# Patient Record
Sex: FEMALE | Race: WHITE | Employment: OTHER | URBAN - METROPOLITAN AREA
[De-identification: names, ages, dates, MRNs, and addresses within clinical notes are randomized per-mention and may not be internally consistent; named-entity substitution may affect disease eponyms.]

---

## 2017-05-01 PROBLEM — R55 EPISODE OF SYNCOPE: Status: ACTIVE | Noted: 2017-05-01

## 2017-05-01 PROBLEM — R42 DIZZINESS: Status: ACTIVE | Noted: 2017-05-01

## 2017-05-01 PROBLEM — E78.9 DISORDER OF LIPID METABOLISM: Status: ACTIVE | Noted: 2017-05-01

## 2017-07-14 ENCOUNTER — OFFICE VISIT (OUTPATIENT)
Dept: ORTHOPEDIC SURGERY | Age: 82
End: 2017-07-14
Payer: MEDICARE

## 2017-07-14 VITALS
HEART RATE: 93 BPM | SYSTOLIC BLOOD PRESSURE: 112 MMHG | WEIGHT: 169 LBS | DIASTOLIC BLOOD PRESSURE: 70 MMHG | BODY MASS INDEX: 28.85 KG/M2 | HEIGHT: 64 IN

## 2017-07-14 DIAGNOSIS — Z96.653 STATUS POST TOTAL BILATERAL KNEE REPLACEMENT: ICD-10-CM

## 2017-07-14 DIAGNOSIS — M25.562 CHRONIC PAIN OF BOTH KNEES: Primary | ICD-10-CM

## 2017-07-14 DIAGNOSIS — G89.29 CHRONIC PAIN OF BOTH KNEES: Primary | ICD-10-CM

## 2017-07-14 DIAGNOSIS — M25.561 CHRONIC PAIN OF BOTH KNEES: Primary | ICD-10-CM

## 2017-07-14 PROCEDURE — 4040F PNEUMOC VAC/ADMIN/RCVD: CPT | Performed by: ORTHOPAEDIC SURGERY

## 2017-07-14 PROCEDURE — 1123F ACP DISCUSS/DSCN MKR DOCD: CPT | Performed by: ORTHOPAEDIC SURGERY

## 2017-07-14 PROCEDURE — 1036F TOBACCO NON-USER: CPT | Performed by: ORTHOPAEDIC SURGERY

## 2017-07-14 PROCEDURE — G8419 CALC BMI OUT NRM PARAM NOF/U: HCPCS | Performed by: ORTHOPAEDIC SURGERY

## 2017-07-14 PROCEDURE — 99213 OFFICE O/P EST LOW 20 MIN: CPT | Performed by: ORTHOPAEDIC SURGERY

## 2017-07-14 PROCEDURE — G8427 DOCREV CUR MEDS BY ELIG CLIN: HCPCS | Performed by: ORTHOPAEDIC SURGERY

## 2017-07-14 PROCEDURE — 1090F PRES/ABSN URINE INCON ASSESS: CPT | Performed by: ORTHOPAEDIC SURGERY

## 2017-07-14 PROCEDURE — G8400 PT W/DXA NO RESULTS DOC: HCPCS | Performed by: ORTHOPAEDIC SURGERY

## 2018-12-26 ENCOUNTER — HOSPITAL ENCOUNTER (EMERGENCY)
Age: 83
Discharge: HOME | End: 2018-12-26
Attending: SPECIALIST
Payer: MEDICARE

## 2018-12-26 ENCOUNTER — APPOINTMENT (OUTPATIENT)
Dept: CT IMAGING | Age: 83
End: 2018-12-26
Payer: MEDICARE

## 2018-12-26 ENCOUNTER — APPOINTMENT (OUTPATIENT)
Dept: GENERAL RADIOLOGY | Age: 83
End: 2018-12-26
Payer: MEDICARE

## 2018-12-26 ENCOUNTER — TELEPHONE (OUTPATIENT)
Dept: PRIMARY CARE CLINIC | Age: 83
End: 2018-12-26

## 2018-12-26 VITALS
DIASTOLIC BLOOD PRESSURE: 59 MMHG | HEIGHT: 64 IN | SYSTOLIC BLOOD PRESSURE: 149 MMHG | OXYGEN SATURATION: 98 % | TEMPERATURE: 98.2 F | WEIGHT: 167 LBS | HEART RATE: 98 BPM | BODY MASS INDEX: 28.51 KG/M2 | RESPIRATION RATE: 12 BRPM

## 2018-12-26 DIAGNOSIS — M25.511 ACUTE PAIN OF RIGHT SHOULDER: ICD-10-CM

## 2018-12-26 DIAGNOSIS — R55 SYNCOPE AND COLLAPSE: Primary | ICD-10-CM

## 2018-12-26 DIAGNOSIS — S09.90XA CLOSED HEAD INJURY, INITIAL ENCOUNTER: ICD-10-CM

## 2018-12-26 PROCEDURE — 73030 X-RAY EXAM OF SHOULDER: CPT

## 2018-12-26 PROCEDURE — 72125 CT NECK SPINE W/O DYE: CPT

## 2018-12-26 PROCEDURE — 6370000000 HC RX 637 (ALT 250 FOR IP): Performed by: PHYSICIAN ASSISTANT

## 2018-12-26 PROCEDURE — 70450 CT HEAD/BRAIN W/O DYE: CPT

## 2018-12-26 PROCEDURE — 99284 EMERGENCY DEPT VISIT MOD MDM: CPT

## 2018-12-26 RX ORDER — HYDROCODONE BITARTRATE AND ACETAMINOPHEN 5; 325 MG/1; MG/1
1 TABLET ORAL ONCE
Status: DISCONTINUED | OUTPATIENT
Start: 2018-12-26 | End: 2018-12-26

## 2018-12-26 RX ORDER — ACETAMINOPHEN 325 MG/1
650 TABLET ORAL ONCE
Status: COMPLETED | OUTPATIENT
Start: 2018-12-26 | End: 2018-12-26

## 2018-12-26 RX ORDER — ACETAMINOPHEN AND CODEINE PHOSPHATE 300; 30 MG/1; MG/1
1 TABLET ORAL ONCE
Status: DISCONTINUED | OUTPATIENT
Start: 2018-12-26 | End: 2018-12-26

## 2018-12-26 RX ADMIN — ACETAMINOPHEN 650 MG: 325 TABLET ORAL at 14:45

## 2018-12-26 ASSESSMENT — ENCOUNTER SYMPTOMS
EYE DISCHARGE: 0
VOMITING: 0
COLOR CHANGE: 1
EYE REDNESS: 0
SHORTNESS OF BREATH: 0
ABDOMINAL PAIN: 0
BACK PAIN: 0
SORE THROAT: 0
COUGH: 0

## 2018-12-26 ASSESSMENT — PAIN SCALES - GENERAL
PAINLEVEL_OUTOF10: 0
PAINLEVEL_OUTOF10: 0

## 2018-12-26 NOTE — TELEPHONE ENCOUNTER
Pt calling to say that she had an episode this morning and fell from her chair. Pt states that she fell on her right side and landed on her shoulder. Pt complaining of her shoulder hurting. Pt is going to go [facility] [place] [facility] done.  Please advise HL

## 2018-12-26 NOTE — ED PROVIDER NOTES
[facility] ED  [address] [address]  Phone: [phone]  Fax: [phone]      Pt Name: Kala Hernandez  MRN: [id number]  [place] 1935  Date of evaluation: 12/26/2018      CHIEF COMPLAINT       Chief Complaint   Patient presents with   Marilyn Ramal Fall     Patient sitting in a chair and fell out off chair with syncope. Patient has hx of same.  Loss of Consciousness     Patient states \"I have a hx of vascular syncope since I was 13 y/o. \"       HISTORY OF PRESENT ILLNESS   (Location, Quality, Severity, Duration, Timing, Context, ModifyingFactors, Associated Signs and Symptoms)     Kala Hernandez is a 80 y.o. female who presents to the ER for evaluation following a syncopal episode. Patient states that she has a known history of syncope. She states that she has been having syncopal episodes since the age of 12. She was sitting in a chair at home drinking water when she felt as if she might pass out. Patient states that she fell hitting the right side of her head on the floor. She denies headaches, change in vision, and dizziness since her fall. She also sustained an injury to her right shoulder. Patient is right-hand dominant. She denies chest pain, difficulty breathing, dizziness, numbness or weakness in her extremities as symptoms prior to her syncopal episode. Patient denies neck pain and back pain. Patient has not taken any medication for her pain prior to arrival to the ER. Patient is insistent that her syncope is not due to any issues with her heart. She states that she has undergone several cardiac workups when she has passed out. Patient is refusing cardiac workup today. She simply would like to proceed with extending studies. Patient denies acute pain. She states that she only has shoulder pain with movement. Nursing Notes were reviewed.     REVIEW OF SYSTEMS     (2-9 systems for level 4, 10 or more for level 5)    Review of Systems

## 2018-12-27 ENCOUNTER — CARE COORDINATION (OUTPATIENT)
Dept: CARE COORDINATION | Age: 83
End: 2018-12-27

## 2018-12-27 NOTE — CARE COORDINATION
Patient was called to follow up with most recent ER visit. A man states pt is not home. A message was left to have patient call back regarding ER visit. Office number given [phone].

## 2019-01-02 ENCOUNTER — OFFICE VISIT (OUTPATIENT)
Dept: PRIMARY CARE CLINIC | Age: 84
End: 2019-01-02
Payer: MEDICARE

## 2019-01-02 VITALS
OXYGEN SATURATION: 94 % | BODY MASS INDEX: 29.7 KG/M2 | DIASTOLIC BLOOD PRESSURE: 72 MMHG | HEART RATE: 80 BPM | WEIGHT: 173 LBS | SYSTOLIC BLOOD PRESSURE: 148 MMHG

## 2019-01-02 DIAGNOSIS — S43.401A SPRAIN OF RIGHT SHOULDER, UNSPECIFIED SHOULDER SPRAIN TYPE, INITIAL ENCOUNTER: ICD-10-CM

## 2019-01-02 DIAGNOSIS — I95.1 ORTHOSTATIC HYPOTENSION: Primary | ICD-10-CM

## 2019-01-02 DIAGNOSIS — R55 VASOVAGAL SYNCOPE: ICD-10-CM

## 2019-01-02 PROCEDURE — G8427 DOCREV CUR MEDS BY ELIG CLIN: HCPCS | Performed by: FAMILY MEDICINE

## 2019-01-02 PROCEDURE — G8482 FLU IMMUNIZE ORDER/ADMIN: HCPCS | Performed by: FAMILY MEDICINE

## 2019-01-02 PROCEDURE — 1101F PT FALLS ASSESS-DOCD LE1/YR: CPT | Performed by: FAMILY MEDICINE

## 2019-01-02 PROCEDURE — 1036F TOBACCO NON-USER: CPT | Performed by: FAMILY MEDICINE

## 2019-01-02 PROCEDURE — 1090F PRES/ABSN URINE INCON ASSESS: CPT | Performed by: FAMILY MEDICINE

## 2019-01-02 PROCEDURE — 4040F PNEUMOC VAC/ADMIN/RCVD: CPT | Performed by: FAMILY MEDICINE

## 2019-01-02 PROCEDURE — G8399 PT W/DXA RESULTS DOCUMENT: HCPCS | Performed by: FAMILY MEDICINE

## 2019-01-02 PROCEDURE — G8417 CALC BMI ABV UP PARAM F/U: HCPCS | Performed by: FAMILY MEDICINE

## 2019-01-02 PROCEDURE — 99213 OFFICE O/P EST LOW 20 MIN: CPT | Performed by: FAMILY MEDICINE

## 2019-01-02 PROCEDURE — 1123F ACP DISCUSS/DSCN MKR DOCD: CPT | Performed by: FAMILY MEDICINE

## 2019-01-02 RX ORDER — MIDODRINE HYDROCHLORIDE 5 MG/1
5 TABLET ORAL EVERY 4 HOURS PRN
Qty: 30 TABLET | Refills: 0 | Status: SHIPPED | OUTPATIENT
Start: 2019-01-02 | End: 2019-03-11 | Stop reason: SDUPTHER

## 2019-01-02 ASSESSMENT — ENCOUNTER SYMPTOMS
SHORTNESS OF BREATH: 0
RHINORRHEA: 0
VOMITING: 0
COUGH: 0
SORE THROAT: 0
EYE REDNESS: 0
WHEEZING: 0
ABDOMINAL PAIN: 0
EYE DISCHARGE: 0
DIARRHEA: 0
NAUSEA: 0

## 2019-02-01 DIAGNOSIS — Z12.39 SCREENING FOR MALIGNANT NEOPLASM OF BREAST: Primary | ICD-10-CM

## 2019-03-06 DIAGNOSIS — Z12.39 SCREENING FOR MALIGNANT NEOPLASM OF BREAST: ICD-10-CM

## 2019-03-11 ENCOUNTER — OFFICE VISIT (OUTPATIENT)
Dept: PRIMARY CARE CLINIC | Age: 84
End: 2019-03-11
Payer: MEDICARE

## 2019-03-11 VITALS
OXYGEN SATURATION: 98 % | TEMPERATURE: 98.1 F | DIASTOLIC BLOOD PRESSURE: 64 MMHG | WEIGHT: 172.2 LBS | BODY MASS INDEX: 29.56 KG/M2 | HEART RATE: 97 BPM | SYSTOLIC BLOOD PRESSURE: 108 MMHG

## 2019-03-11 DIAGNOSIS — H61.21 IMPACTED CERUMEN OF RIGHT EAR: ICD-10-CM

## 2019-03-11 DIAGNOSIS — J20.9 ACUTE BRONCHITIS, UNSPECIFIED ORGANISM: Primary | ICD-10-CM

## 2019-03-11 PROCEDURE — G8427 DOCREV CUR MEDS BY ELIG CLIN: HCPCS | Performed by: PHYSICIAN ASSISTANT

## 2019-03-11 PROCEDURE — 4040F PNEUMOC VAC/ADMIN/RCVD: CPT | Performed by: PHYSICIAN ASSISTANT

## 2019-03-11 PROCEDURE — G8399 PT W/DXA RESULTS DOCUMENT: HCPCS | Performed by: PHYSICIAN ASSISTANT

## 2019-03-11 PROCEDURE — 1101F PT FALLS ASSESS-DOCD LE1/YR: CPT | Performed by: PHYSICIAN ASSISTANT

## 2019-03-11 PROCEDURE — G8417 CALC BMI ABV UP PARAM F/U: HCPCS | Performed by: PHYSICIAN ASSISTANT

## 2019-03-11 PROCEDURE — 1123F ACP DISCUSS/DSCN MKR DOCD: CPT | Performed by: PHYSICIAN ASSISTANT

## 2019-03-11 PROCEDURE — 1036F TOBACCO NON-USER: CPT | Performed by: PHYSICIAN ASSISTANT

## 2019-03-11 PROCEDURE — 1090F PRES/ABSN URINE INCON ASSESS: CPT | Performed by: PHYSICIAN ASSISTANT

## 2019-03-11 PROCEDURE — G8482 FLU IMMUNIZE ORDER/ADMIN: HCPCS | Performed by: PHYSICIAN ASSISTANT

## 2019-03-11 PROCEDURE — 69209 REMOVE IMPACTED EAR WAX UNI: CPT | Performed by: PHYSICIAN ASSISTANT

## 2019-03-11 PROCEDURE — 99213 OFFICE O/P EST LOW 20 MIN: CPT | Performed by: PHYSICIAN ASSISTANT

## 2019-03-11 RX ORDER — AZITHROMYCIN 250 MG/1
TABLET, FILM COATED ORAL
Qty: 1 PACKET | Refills: 0 | Status: SHIPPED | OUTPATIENT
Start: 2019-03-11 | End: 2019-03-21

## 2019-03-11 RX ORDER — MIDODRINE HYDROCHLORIDE 2.5 MG/1
2.5 TABLET ORAL EVERY 4 HOURS PRN
Qty: 90 TABLET | Refills: 0 | Status: SHIPPED | OUTPATIENT
Start: 2019-03-11

## 2019-03-11 ASSESSMENT — ENCOUNTER SYMPTOMS
COUGH: 1
SORE THROAT: 1
RHINORRHEA: 1
CHEST TIGHTNESS: 1
WHEEZING: 1
SHORTNESS OF BREATH: 1

## 2019-03-11 ASSESSMENT — PATIENT HEALTH QUESTIONNAIRE - PHQ9
SUM OF ALL RESPONSES TO PHQ9 QUESTIONS 1 & 2: 0
1. LITTLE INTEREST OR PLEASURE IN DOING THINGS: 0
SUM OF ALL RESPONSES TO PHQ QUESTIONS 1-9: 0
SUM OF ALL RESPONSES TO PHQ QUESTIONS 1-9: 0
2. FEELING DOWN, DEPRESSED OR HOPELESS: 0

## 2019-03-29 ENCOUNTER — OFFICE VISIT (OUTPATIENT)
Dept: PRIMARY CARE CLINIC | Age: 84
End: 2019-03-29
Payer: MEDICARE

## 2019-03-29 VITALS
SYSTOLIC BLOOD PRESSURE: 110 MMHG | HEART RATE: 75 BPM | DIASTOLIC BLOOD PRESSURE: 62 MMHG | OXYGEN SATURATION: 95 % | WEIGHT: 172.6 LBS | BODY MASS INDEX: 29.63 KG/M2

## 2019-03-29 DIAGNOSIS — E55.9 VITAMIN D DEFICIENCY: Primary | ICD-10-CM

## 2019-03-29 DIAGNOSIS — D64.9 ANEMIA, UNSPECIFIED TYPE: ICD-10-CM

## 2019-03-29 DIAGNOSIS — I95.1 ORTHOSTATIC HYPOTENSION: ICD-10-CM

## 2019-03-29 DIAGNOSIS — E78.2 MIXED HYPERLIPIDEMIA: ICD-10-CM

## 2019-03-29 PROCEDURE — G8427 DOCREV CUR MEDS BY ELIG CLIN: HCPCS | Performed by: FAMILY MEDICINE

## 2019-03-29 PROCEDURE — 1090F PRES/ABSN URINE INCON ASSESS: CPT | Performed by: FAMILY MEDICINE

## 2019-03-29 PROCEDURE — G8417 CALC BMI ABV UP PARAM F/U: HCPCS | Performed by: FAMILY MEDICINE

## 2019-03-29 PROCEDURE — G8399 PT W/DXA RESULTS DOCUMENT: HCPCS | Performed by: FAMILY MEDICINE

## 2019-03-29 PROCEDURE — 1036F TOBACCO NON-USER: CPT | Performed by: FAMILY MEDICINE

## 2019-03-29 PROCEDURE — 1123F ACP DISCUSS/DSCN MKR DOCD: CPT | Performed by: FAMILY MEDICINE

## 2019-03-29 PROCEDURE — 99213 OFFICE O/P EST LOW 20 MIN: CPT | Performed by: FAMILY MEDICINE

## 2019-03-29 PROCEDURE — 4040F PNEUMOC VAC/ADMIN/RCVD: CPT | Performed by: FAMILY MEDICINE

## 2019-03-29 PROCEDURE — G8482 FLU IMMUNIZE ORDER/ADMIN: HCPCS | Performed by: FAMILY MEDICINE

## 2019-03-29 RX ORDER — CHLORAL HYDRATE 500 MG
3000 CAPSULE ORAL 3 TIMES DAILY
COMMUNITY
End: 2022-05-03

## 2019-03-29 RX ORDER — FLUOCINOLONE ACETONIDE 0.1 MG/ML
SOLUTION TOPICAL 2 TIMES DAILY
COMMUNITY
End: 2020-09-21 | Stop reason: SDUPTHER

## 2019-03-29 ASSESSMENT — ENCOUNTER SYMPTOMS
DIARRHEA: 0
ABDOMINAL PAIN: 0
RHINORRHEA: 0
EYE REDNESS: 0
EYE DISCHARGE: 0
COUGH: 0
WHEEZING: 0
VOMITING: 0
NAUSEA: 0
SHORTNESS OF BREATH: 0
SORE THROAT: 0

## 2019-05-10 RX ORDER — DICLOFENAC SODIUM 75 MG/1
TABLET, DELAYED RELEASE ORAL
Qty: 180 TABLET | Refills: 1 | Status: SHIPPED | OUTPATIENT
Start: 2019-05-10 | End: 2019-07-15 | Stop reason: SDUPTHER

## 2019-06-14 ENCOUNTER — TELEPHONE (OUTPATIENT)
Dept: ORTHOPEDIC SURGERY | Age: 84
End: 2019-06-14

## 2019-06-14 NOTE — TELEPHONE ENCOUNTER
She had roland TKAs in the past and had to go to the dentist this week. She took the antibiotics 1 day too early. Her dentist just gives her 4 pills to take the day of the apt. She took them 1 day too soon so didn't get any on the day of her apt. Do you want to prescribe her some more or do you think she was covered by that even if it was a day too early. She also was told by her kids that her legs were going crooked again. She wanted me to ask you if you think she needs an apt to look at them.

## 2019-07-08 ENCOUNTER — OFFICE VISIT (OUTPATIENT)
Dept: ORTHOPEDIC SURGERY | Age: 84
End: 2019-07-08
Payer: MEDICARE

## 2019-07-08 DIAGNOSIS — M25.561 PAIN IN BOTH KNEES, UNSPECIFIED CHRONICITY: Primary | ICD-10-CM

## 2019-07-08 DIAGNOSIS — M25.562 PAIN IN BOTH KNEES, UNSPECIFIED CHRONICITY: Primary | ICD-10-CM

## 2019-07-08 PROCEDURE — 1036F TOBACCO NON-USER: CPT | Performed by: ORTHOPAEDIC SURGERY

## 2019-07-08 PROCEDURE — 1123F ACP DISCUSS/DSCN MKR DOCD: CPT | Performed by: ORTHOPAEDIC SURGERY

## 2019-07-08 PROCEDURE — 99213 OFFICE O/P EST LOW 20 MIN: CPT | Performed by: ORTHOPAEDIC SURGERY

## 2019-07-08 PROCEDURE — 4040F PNEUMOC VAC/ADMIN/RCVD: CPT | Performed by: ORTHOPAEDIC SURGERY

## 2019-07-08 PROCEDURE — G8417 CALC BMI ABV UP PARAM F/U: HCPCS | Performed by: ORTHOPAEDIC SURGERY

## 2019-07-08 PROCEDURE — G8427 DOCREV CUR MEDS BY ELIG CLIN: HCPCS | Performed by: ORTHOPAEDIC SURGERY

## 2019-07-08 PROCEDURE — 1090F PRES/ABSN URINE INCON ASSESS: CPT | Performed by: ORTHOPAEDIC SURGERY

## 2019-07-08 PROCEDURE — G8399 PT W/DXA RESULTS DOCUMENT: HCPCS | Performed by: ORTHOPAEDIC SURGERY

## 2019-07-15 RX ORDER — DICLOFENAC SODIUM 75 MG/1
75 TABLET, DELAYED RELEASE ORAL 2 TIMES DAILY
Qty: 180 TABLET | Refills: 3 | Status: SHIPPED | OUTPATIENT
Start: 2019-07-15 | End: 2020-10-27 | Stop reason: SDUPTHER

## 2019-07-16 RX ORDER — OMEPRAZOLE 20 MG/1
20 CAPSULE, DELAYED RELEASE ORAL DAILY
Qty: 90 CAPSULE | Refills: 3 | Status: SHIPPED | OUTPATIENT
Start: 2019-07-16 | End: 2020-10-27 | Stop reason: SDUPTHER

## 2019-09-27 ENCOUNTER — OFFICE VISIT (OUTPATIENT)
Dept: PRIMARY CARE CLINIC | Age: 84
End: 2019-09-27
Payer: MEDICARE

## 2019-09-27 VITALS
HEIGHT: 63 IN | DIASTOLIC BLOOD PRESSURE: 80 MMHG | WEIGHT: 173.58 LBS | OXYGEN SATURATION: 97 % | HEART RATE: 57 BPM | SYSTOLIC BLOOD PRESSURE: 138 MMHG | BODY MASS INDEX: 30.75 KG/M2

## 2019-09-27 DIAGNOSIS — Z00.00 ROUTINE GENERAL MEDICAL EXAMINATION AT A HEALTH CARE FACILITY: Primary | ICD-10-CM

## 2019-09-27 DIAGNOSIS — Z00.00 MEDICARE ANNUAL WELLNESS VISIT, SUBSEQUENT: ICD-10-CM

## 2019-09-27 PROCEDURE — 90653 IIV ADJUVANT VACCINE IM: CPT | Performed by: FAMILY MEDICINE

## 2019-09-27 PROCEDURE — 4040F PNEUMOC VAC/ADMIN/RCVD: CPT | Performed by: FAMILY MEDICINE

## 2019-09-27 PROCEDURE — G0008 ADMIN INFLUENZA VIRUS VAC: HCPCS | Performed by: FAMILY MEDICINE

## 2019-09-27 PROCEDURE — G0438 PPPS, INITIAL VISIT: HCPCS | Performed by: FAMILY MEDICINE

## 2019-09-27 PROCEDURE — 1123F ACP DISCUSS/DSCN MKR DOCD: CPT | Performed by: FAMILY MEDICINE

## 2019-09-27 ASSESSMENT — PATIENT HEALTH QUESTIONNAIRE - PHQ9
SUM OF ALL RESPONSES TO PHQ QUESTIONS 1-9: 0
2. FEELING DOWN, DEPRESSED OR HOPELESS: 0
SUM OF ALL RESPONSES TO PHQ9 QUESTIONS 1 & 2: 0
1. LITTLE INTEREST OR PLEASURE IN DOING THINGS: 0
SUM OF ALL RESPONSES TO PHQ QUESTIONS 1-9: 0

## 2019-09-27 NOTE — PROGRESS NOTES
Calcium Carbonate-Vitamin D (CALCIUM + D) 600-200 MG-UNIT TABS Take  by mouth. Yes Historical Provider, MD   therapeutic multivitamin-minerals (THERAGRAN-M) tablet Take 1 tablet by mouth daily. Yes Historical Provider, MD   aspirin 81 MG EC tablet Take 81 mg by mouth daily. Yes Historical Provider, MD       Past Medical History:   Diagnosis Date    Basal cell carcinoma     mulitple    Hyperlipidemia 8/30/2011    Mild hyperglycemia. 8/30/2011    Orthostatic hypotension 8/30/2011    Osteoarthritis     knees, thumbs    Post-menopausal     Pre-cancerous skin lesions.  8/30/2011    Psoriasis 8/30/2011    Syncope, non cardiac     \"vascular syncope\" per patient     Past Surgical History:   Procedure Laterality Date    APPENDECTOMY      during hyst    COLON SURGERY      fistula repair    COLONOSCOPY  1997, 2002    FOOT SURGERY      hammartoe    HYSTERECTOMY      complete, d/t fibroids    JOINT REPLACEMENT  2001    Right and left knees    SIGMOIDOSCOPY  2016    SKIN CANCER DESTRUCTION      basal cell    SKIN CANCER EXCISION      basal cell       Family History   Problem Relation Age of Onset    Cancer Other         ovarian    Breast Cancer Mother     Cancer Sister         melanoma    Diabetes Sister     Cancer Brother         kidney    Cancer Maternal Aunt         melanoma    Prostate Cancer Maternal Grandfather     Diabetes Sister     Heart Disease Father         angina    Diabetes Maternal Grandmother     Cancer Brother         colon       CareTeam (Including outside providers/suppliers regularly involved in providing care):   Patient Care Team:  Isatu Rouse MD as PCP - General (Family Medicine)  Isatu Rouse MD as PCP - [facility] Empaneled Provider  Carolyn Casper as Consulting Physician (Dermatology)  Aurelia Colon MD as Consulting Physician (Gastroenterology)  Carolyn Casper as Consulting Physician (Dermatology)  Aurelia Colon MD as Consulting Physician (Gastroenterology)  Lacy Cardoso MD (Family Medicine)  Unknown Provider Result (Inactive)  Unknown Provider Result (Inactive)    Wt Readings from Last 3 Encounters:   09/27/19 173 lb 9.3 oz (78.7 kg)   03/29/19 172 lb 9.6 oz (78.3 kg)   03/11/19 172 lb 3.2 oz (78.1 kg)     Vitals:    09/27/19 1305   BP: 138/80   Pulse: 57   SpO2: 97%   Weight: 173 lb 9.3 oz (78.7 kg)   Height: 5' 3\" (1.6 m)     Body mass index is 30.75 kg/m². Based upon direct observation of the patient, evaluation of cognition reveals recent and remote memory intact. Physical Exam    Patient's complete Health Risk Assessment and screening values have been reviewed and are found in Flowsheets. The following problems were reviewed today and where indicated follow up appointments were made and/or referrals ordered. Positive Risk Factor Screenings with Interventions:     Fall Risk:  2 or more falls in past year?: (!) yes  Fall with injury in past year?: (!) yes  Fall Risk Interventions:    · falls were because of syncope- will discuss with cardiology about prevention med    Health Habits/Nutrition:  Health Habits/Nutrition  Do you exercise for at least 20 minutes 2-3 times per week?: Yes  Have you lost any weight without trying in the past 3 months?: No  Do you eat fewer than 2 meals per day?: No  Have you seen a dentist within the past year?: Yes  Body mass index is 30.75 kg/m². Health Habits/Nutrition Interventions:  · [facility]       Safety:  Safety  Do you have working smoke detectors?: (!) No  Have all throw rugs been removed or fastened?: Yes  Do you have non-slip mats or surfaces in all bathtubs/showers?: (!) No  Do all of your stairways have a railing or banister?: Yes  Are your doorways, halls and stairs free of clutter?: Yes  Do you always fasten your seatbelt when you are in a car?: Yes  Safety Interventions:  · Home safety tips provided    Personalized Preventive Plan   Current Health Maintenance Status  Immunization History   Administered Date(s) Administered    Influenza Virus Vaccine 11/20/2012    Influenza Whole 10/12/2010    Influenza, High Dose (Fluzone 65 yrs and older) 09/18/2017, 09/28/2018    Pneumococcal Conjugate 13-valent ([id number]) 05/29/2015    Pneumococcal Polysaccharide ([id number]) 10/01/2006    Zoster Live (Zostavax) 12/03/2015        Health Maintenance   Topic Date Due    DTaP/Tdap/Td vaccine (1 - Tdap) [birth year]1954    Shingles Vaccine (2 of 3) 01/28/2016    Annual Wellness Visit (AWV)  05/29/2019    Flu vaccine (1) 09/01/2019    DEXA (modify frequency per FRAX score)  Completed    Pneumococcal 65+ years Vaccine  Completed     Recommendations for Preventive Services Due: see orders and patient instructions/AVS.  . Recommended screening schedule for the next 5-10 years is provided to the patient in written form: see Patient Stephanie Coelho was seen today for medicare awv and flu vaccine.     Diagnoses and all orders for this visit:    Medicare annual wellness visit, subsequent    Other orders  -     INFLUENZA, TRIV, INACTIVATED, SUBUNIT, ADJUVANTED, 65 YRS AND OLDER, IM, PREFILL SYR, 0.5ML (FLUAD TRIV)

## 2019-09-27 NOTE — PATIENT INSTRUCTIONS
Check with insuarnce if want to get Shingles vaccine and tetanus/ whooping cough  Check with cardiologist about starting Florinef to prevent syncope or maybe taking proamitine regularly  [employer] or online compression socks for support socks  Personalized Preventive Plan for [place] - 9/27/2019  Medicare offers a range of preventive health benefits. Some of the tests and screenings are paid in full while other may be subject to a deductible, co-insurance, and/or copay. Some of these benefits include a comprehensive review of your medical history including lifestyle, illnesses that may run in your family, and various assessments and screenings as appropriate. After reviewing your medical record and screening and assessments performed today your provider may have ordered immunizations, labs, imaging, and/or referrals for you. A list of these orders (if applicable) as well as your Preventive Care list are included within your After Visit Summary for your review. Other Preventive Recommendations:    · A preventive eye exam performed by an eye specialist is recommended every 1-2 years to screen for glaucoma; cataracts, macular degeneration, and other eye disorders. · A preventive dental visit is recommended every 6 months. · Try to get at least 150 minutes of exercise per week or 10,000 steps per day on a pedometer . · Order or download the FREE \"Exercise & Physical Activity: Your Everyday Guide\" from [employer]. Call 9-874.174.9750 or search [employer] online. · You need 6399-5867 mg of calcium and 0181-9853 IU of vitamin D per day. It is possible to meet your calcium requirement with diet alone, but a vitamin D supplement is usually necessary to meet this goal.  · When exposed to the sun, use a sunscreen that protects against both UVA and UVB radiation with an SPF of 30 or greater.  Reapply every 2 to 3 hours or after sweating, drying off with a

## 2019-10-08 ENCOUNTER — TELEPHONE (OUTPATIENT)
Dept: PRIMARY CARE CLINIC | Age: 84
End: 2019-10-08

## 2019-10-27 PROBLEM — Z00.00 MEDICARE ANNUAL WELLNESS VISIT, SUBSEQUENT: Status: RESOLVED | Noted: 2019-09-27 | Resolved: 2019-10-27

## 2020-01-15 ENCOUNTER — TELEPHONE (OUTPATIENT)
Dept: PRIMARY CARE CLINIC | Age: 85
End: 2020-01-15

## 2020-01-16 NOTE — TELEPHONE ENCOUNTER
Faxed to [facility]. Patient was notified. Verbal understanding. She stated she would call tomorrow to schedule it.

## 2020-06-12 ENCOUNTER — OFFICE VISIT (OUTPATIENT)
Dept: PRIMARY CARE CLINIC | Age: 85
End: 2020-06-12
Payer: MEDICARE

## 2020-06-12 VITALS
SYSTOLIC BLOOD PRESSURE: 120 MMHG | TEMPERATURE: 97.2 F | DIASTOLIC BLOOD PRESSURE: 78 MMHG | OXYGEN SATURATION: 95 % | HEART RATE: 86 BPM | BODY MASS INDEX: 30.61 KG/M2 | WEIGHT: 172.8 LBS

## 2020-06-12 PROCEDURE — 4040F PNEUMOC VAC/ADMIN/RCVD: CPT | Performed by: FAMILY MEDICINE

## 2020-06-12 PROCEDURE — G8417 CALC BMI ABV UP PARAM F/U: HCPCS | Performed by: FAMILY MEDICINE

## 2020-06-12 PROCEDURE — G8427 DOCREV CUR MEDS BY ELIG CLIN: HCPCS | Performed by: FAMILY MEDICINE

## 2020-06-12 PROCEDURE — G8399 PT W/DXA RESULTS DOCUMENT: HCPCS | Performed by: FAMILY MEDICINE

## 2020-06-12 PROCEDURE — 99213 OFFICE O/P EST LOW 20 MIN: CPT | Performed by: FAMILY MEDICINE

## 2020-06-12 PROCEDURE — 1123F ACP DISCUSS/DSCN MKR DOCD: CPT | Performed by: FAMILY MEDICINE

## 2020-06-12 PROCEDURE — 1036F TOBACCO NON-USER: CPT | Performed by: FAMILY MEDICINE

## 2020-06-12 PROCEDURE — 1090F PRES/ABSN URINE INCON ASSESS: CPT | Performed by: FAMILY MEDICINE

## 2020-06-12 ASSESSMENT — ENCOUNTER SYMPTOMS
COUGH: 0
EYE REDNESS: 0
ABDOMINAL PAIN: 0
NAUSEA: 0
RHINORRHEA: 0
DIARRHEA: 0
SHORTNESS OF BREATH: 0
SORE THROAT: 0
VOMITING: 0
WHEEZING: 0
EYE DISCHARGE: 0

## 2020-06-12 ASSESSMENT — PATIENT HEALTH QUESTIONNAIRE - PHQ9
SUM OF ALL RESPONSES TO PHQ QUESTIONS 1-9: 0
2. FEELING DOWN, DEPRESSED OR HOPELESS: 0
1. LITTLE INTEREST OR PLEASURE IN DOING THINGS: 0
SUM OF ALL RESPONSES TO PHQ9 QUESTIONS 1 & 2: 0
SUM OF ALL RESPONSES TO PHQ QUESTIONS 1-9: 0

## 2020-06-12 NOTE — PROGRESS NOTES
[facility]  [address] [place] [id number]  Dept: Favian Burleson Gunjan Masterson is a 80 y.o. female who presents today for her medical conditions/complaintsas noted below. Chief Complaint   Patient presents with    Medication Check       HPI:     HPI- pt feeling okay. Occas pain in side- comes and goes. [facility] No refills needed     LDL Calculated (mg/dL)   Date Value   06/04/2018 135   11/19/2013 154   11/14/2012 149       (goal LDL is <100)   AST (U/L)   Date Value   11/14/2012 17     ALT (U/L)   Date Value   11/14/2012 15     BUN (mg/dL)   Date Value   11/19/2013 17     BP Readings from Last 3 Encounters:   06/12/20 120/78   09/27/19 138/80   03/29/19 110/62          (goal 120/80)    Past Medical History:   Diagnosis Date    Basal cell carcinoma     mulitple    Hyperlipidemia 8/30/2011    Mild hyperglycemia. 8/30/2011    Orthostatic hypotension 8/30/2011    Osteoarthritis     knees, thumbs    Post-menopausal     Pre-cancerous skin lesions.  8/30/2011    Psoriasis 8/30/2011    Syncope, non cardiac     \"vascular syncope\" per patient      Past Surgical History:   Procedure Laterality Date    APPENDECTOMY      during hyst    COLON SURGERY      fistula repair    COLONOSCOPY  1997, 2002    FOOT SURGERY      hammartoe    HYSTERECTOMY      complete, d/t fibroids    JOINT REPLACEMENT  2001    Right and left knees    SIGMOIDOSCOPY  2016    SKIN CANCER DESTRUCTION      basal cell    SKIN CANCER EXCISION      basal cell       Family History   Problem Relation Age of Onset    Cancer Other         ovarian    Breast Cancer Mother     Cancer Sister         melanoma    Diabetes Sister     Cancer Brother         kidney    Cancer Maternal Aunt         melanoma    Prostate Cancer Maternal Grandfather     Diabetes Sister     Heart Disease Father         angina    Diabetes Maternal Grandmother     Cancer Brother colon       Social History     Tobacco Use    Smoking status: Never Smoker    Smokeless tobacco: Never Used   Substance Use Topics    Alcohol use: No      Current Outpatient Medications   Medication Sig Dispense Refill    PROBIOTIC PRODUCT PO Take 75 mg by mouth      omeprazole (PRILOSEC) 20 MG delayed release capsule Take 1 capsule by mouth daily 90 capsule 3    diclofenac (VOLTAREN) 75 MG EC tablet Take 1 tablet by mouth 2 times daily 180 tablet 3    Omega-3 Fatty Acids (FISH OIL) 1000 MG CAPS Take 3,000 mg by mouth 3 times daily      fluocinolone acetonide (SYNALAR) 0.01 % external solution Apply topically 2 times daily Apply topically 2 times daily.  midodrine (PROAMATINE) 2.5 MG tablet Take 1 tablet by mouth every 4 hours as needed (near syncope) Indications: Dr Katarzyna Finn only wants pt taking 2.5 mg 90 tablet 0    vitamin D (CHOLECALCIFEROL) 1000 UNIT TABS tablet Take 3,000 Units by mouth daily      Docusate Calcium (STOOL SOFTENER PO) Take 1 tablet by mouth daily      niacin 100 MG tablet Take 100 mg by mouth daily (with breakfast)      Calcium Carbonate-Vitamin D (CALCIUM + D) 600-200 MG-UNIT TABS Take  by mouth.  therapeutic multivitamin-minerals (THERAGRAN-M) tablet Take 1 tablet by mouth daily.  aspirin 81 MG EC tablet Take 81 mg by mouth daily. No current facility-administered medications for this visit. Allergies   Allergen Reactions    Adhesive Tape      Tears her skin    Anesthetic [Benzocaine]      Difficulty waking    Benzocaine-Benzethonium      Difficulty waking    Pcn [Penicillins] Rash     Other reaction(s):  Intolerance       Health Maintenance   Topic Date Due    DTaP/Tdap/Td vaccine (1 - Tdap) 07[birth year]09/1954    Shingles Vaccine (2 of 3) 01/28/2016    Annual Wellness Visit (AWV)  09/27/2020    DEXA (modify frequency per FRAX score)  Completed    Flu vaccine  Completed    Pneumococcal 65+ years Vaccine  Completed    Hepatitis A vaccine  Aged Out

## 2020-09-21 ENCOUNTER — OFFICE VISIT (OUTPATIENT)
Dept: PRIMARY CARE CLINIC | Age: 85
End: 2020-09-21
Payer: MEDICARE

## 2020-09-21 VITALS
WEIGHT: 173.6 LBS | DIASTOLIC BLOOD PRESSURE: 80 MMHG | BODY MASS INDEX: 30.75 KG/M2 | HEART RATE: 90 BPM | OXYGEN SATURATION: 96 % | SYSTOLIC BLOOD PRESSURE: 122 MMHG | TEMPERATURE: 97.2 F

## 2020-09-21 PROCEDURE — 90694 VACC AIIV4 NO PRSRV 0.5ML IM: CPT | Performed by: FAMILY MEDICINE

## 2020-09-21 PROCEDURE — 1036F TOBACCO NON-USER: CPT | Performed by: FAMILY MEDICINE

## 2020-09-21 PROCEDURE — 1090F PRES/ABSN URINE INCON ASSESS: CPT | Performed by: FAMILY MEDICINE

## 2020-09-21 PROCEDURE — G0008 ADMIN INFLUENZA VIRUS VAC: HCPCS | Performed by: FAMILY MEDICINE

## 2020-09-21 PROCEDURE — 1123F ACP DISCUSS/DSCN MKR DOCD: CPT | Performed by: FAMILY MEDICINE

## 2020-09-21 PROCEDURE — G8417 CALC BMI ABV UP PARAM F/U: HCPCS | Performed by: FAMILY MEDICINE

## 2020-09-21 PROCEDURE — G8427 DOCREV CUR MEDS BY ELIG CLIN: HCPCS | Performed by: FAMILY MEDICINE

## 2020-09-21 PROCEDURE — G8399 PT W/DXA RESULTS DOCUMENT: HCPCS | Performed by: FAMILY MEDICINE

## 2020-09-21 PROCEDURE — 99213 OFFICE O/P EST LOW 20 MIN: CPT | Performed by: FAMILY MEDICINE

## 2020-09-21 PROCEDURE — 4040F PNEUMOC VAC/ADMIN/RCVD: CPT | Performed by: FAMILY MEDICINE

## 2020-09-21 RX ORDER — FLUOCINOLONE ACETONIDE 0.1 MG/ML
SOLUTION TOPICAL 2 TIMES DAILY
Qty: 1 BOTTLE | Refills: 1 | Status: SHIPPED | OUTPATIENT
Start: 2020-09-21

## 2020-09-21 ASSESSMENT — ENCOUNTER SYMPTOMS
SHORTNESS OF BREATH: 0
EYE REDNESS: 0
EYE DISCHARGE: 0
WHEEZING: 0
RHINORRHEA: 0
NAUSEA: 0
SORE THROAT: 0
VOMITING: 0
COUGH: 0
DIARRHEA: 1
ABDOMINAL PAIN: 1

## 2020-09-21 NOTE — PROGRESS NOTES
[facility]  [address] [place] [id number]  Dept: Favian Burleson Marianna Rollins is a 80 y.o. female who presents today for her medical conditions/complaintsas noted below. Chief Complaint   Patient presents with    Medication Check    Medication Refill     Pending        HPI:     HPI- pt doing okay. C/o some abd pain- LLQ was constant but has been gone for the last week now. Also c/o sob. Judith with steps etc.      LDL Calculated (mg/dL)   Date Value   06/04/2018 135   11/19/2013 154   11/14/2012 149       (goal LDL is <100)   AST (U/L)   Date Value   11/14/2012 17     ALT (U/L)   Date Value   11/14/2012 15     BUN (mg/dL)   Date Value   11/19/2013 17     BP Readings from Last 3 Encounters:   09/21/20 122/80   06/12/20 120/78   09/27/19 138/80          (goal 120/80)    Past Medical History:   Diagnosis Date    Basal cell carcinoma     mulitple    Hyperlipidemia 8/30/2011    Mild hyperglycemia. 8/30/2011    Orthostatic hypotension 8/30/2011    Osteoarthritis     knees, thumbs    Post-menopausal     Pre-cancerous skin lesions.  8/30/2011    Psoriasis 8/30/2011    Syncope, non cardiac     \"vascular syncope\" per patient      Past Surgical History:   Procedure Laterality Date    APPENDECTOMY      during hyst    COLON SURGERY      fistula repair    COLONOSCOPY  1997, 2002    FOOT SURGERY      hammartoe    HYSTERECTOMY      complete, d/t fibroids    JOINT REPLACEMENT  2001    Right and left knees    SIGMOIDOSCOPY  2016    SKIN CANCER DESTRUCTION      basal cell    SKIN CANCER EXCISION      basal cell       Family History   Problem Relation Age of Onset    Cancer Other         ovarian    Breast Cancer Mother     Cancer Sister         melanoma    Diabetes Sister     Cancer Brother         kidney    Cancer Maternal Aunt         melanoma    Prostate Cancer Maternal Grandfather     Diabetes Sister     Heart Disease Father angina    Diabetes Maternal Grandmother     Cancer Brother         colon       Social History     Tobacco Use    Smoking status: Never Smoker    Smokeless tobacco: Never Used   Substance Use Topics    Alcohol use: No      Current Outpatient Medications   Medication Sig Dispense Refill    fluocinolone acetonide (SYNALAR) 0.01 % external solution Apply topically 2 times daily Apply topically 2 times daily. 1 Bottle 1    omeprazole (PRILOSEC) 20 MG delayed release capsule Take 1 capsule by mouth daily 90 capsule 3    diclofenac (VOLTAREN) 75 MG EC tablet Take 1 tablet by mouth 2 times daily 180 tablet 3    midodrine (PROAMATINE) 2.5 MG tablet Take 1 tablet by mouth every 4 hours as needed (near syncope) Indications: Dr Nan Oliveira only wants pt taking 2.5 mg 90 tablet 0    vitamin D (CHOLECALCIFEROL) 1000 UNIT TABS tablet Take 3,000 Units by mouth daily      Docusate Calcium (STOOL SOFTENER PO) Take 1 tablet by mouth daily      niacin 100 MG tablet Take 100 mg by mouth daily (with breakfast)      Calcium Carbonate-Vitamin D (CALCIUM + D) 600-200 MG-UNIT TABS Take  by mouth.  therapeutic multivitamin-minerals (THERAGRAN-M) tablet Take 1 tablet by mouth daily.  aspirin 81 MG EC tablet Take 81 mg by mouth daily.  Omega-3 Fatty Acids (FISH OIL) 1000 MG CAPS Take 3,000 mg by mouth 3 times daily       No current facility-administered medications for this visit. Allergies   Allergen Reactions    Adhesive Tape      Tears her skin    Anesthetic [Benzocaine]      Difficulty waking    Benzocaine-Benzethonium      Difficulty waking    Pcn [Penicillins] Rash     Other reaction(s):  Intolerance       Health Maintenance   Topic Date Due    DTaP/Tdap/Td vaccine (1 - Tdap) 07/09/1954    Shingles Vaccine (2 of 3) 01/28/2016    Flu vaccine (1) 09/01/2020    Annual Wellness Visit (AWV)  09/27/2020    DEXA (modify frequency per FRAX score)  Completed    Pneumococcal 65+ years Vaccine Completed    Hepatitis A vaccine  Aged Out    Hepatitis B vaccine  Aged Out    Hib vaccine  Aged Out    Meningococcal (ACWY) vaccine  Aged Out       Subjective:      Review of Systems   Constitutional: Negative for chills and fever. HENT: Negative for rhinorrhea and sore throat. Eyes: Negative for discharge and redness. Respiratory: Negative for cough, shortness of breath and wheezing. Cardiovascular: Negative for chest pain and palpitations. Gastrointestinal: Positive for abdominal pain and diarrhea (thinks it was due to probiotic- stopped taking and is better now). Negative for nausea and vomiting. Genitourinary: Negative for dysuria and frequency. Musculoskeletal: Negative for arthralgias and myalgias. Neurological: Negative for dizziness, light-headedness and headaches. Psychiatric/Behavioral: Negative for sleep disturbance. Objective:     /80   Pulse 90   Temp 97.2 °F (36.2 °C)   Wt 173 lb 9.6 oz (78.7 kg)   SpO2 96%   BMI 30.75 kg/m²   Physical Exam  Vitals signs and nursing note reviewed. Constitutional:       General: She is not in acute distress. Appearance: She is well-developed. She is not ill-appearing. HENT:      Head: Normocephalic and atraumatic. Right Ear: External ear normal.      Left Ear: External ear normal.   Eyes:      General: No scleral icterus. Right eye: No discharge. Left eye: No discharge. Conjunctiva/sclera: Conjunctivae normal.      Pupils: Pupils are equal, round, and reactive to light. Neck:      Thyroid: No thyromegaly. Trachea: No tracheal deviation. Cardiovascular:      Rate and Rhythm: Normal rate and regular rhythm. Heart sounds: Normal heart sounds. Pulmonary:      Effort: Pulmonary effort is normal. No respiratory distress. Breath sounds: Normal breath sounds. No wheezing. Lymphadenopathy:      Cervical: No cervical adenopathy. Skin:     General: Skin is warm.       Findings: No rash.   Neurological:      Mental Status: She is alert and oriented to person, place, and time. Psychiatric:         Mood and Affect: Mood normal.         Behavior: Behavior normal.         Thought Content: Thought content normal.         Assessment:       Diagnosis Orders   1. Orthostatic hypotension     2. Vasovagal syncope     3. Vitamin D deficiency     4. Primary osteoarthritis involving multiple joints          Plan:      Return in about 3 months (around 12/21/2020). Orders Placed This Encounter   Procedures    INFLUENZA, QUADV, ADJUVANTED, 72 YRS =, IM, PF, PREFILL SYR, 0.5ML (FLUAD)     Orders Placed This Encounter   Medications    fluocinolone acetonide (SYNALAR) 0.01 % external solution     Sig: Apply topically 2 times daily Apply topically 2 times daily. Dispense:  1 Bottle     Refill:  1       Patient given educationalmaterials - see patient instructions. Discussed use, benefit, and side effectsof prescribed medications. All patient questions answered. Pt voiced understanding. Reviewed health maintenance. Instructed to continue current medications, diet andexercise. Patient agreed with treatment plan. Follow up as directed.      Electronicallysigned by Salvatore Abad MD on 9/21/2020 at 1:21 PM

## 2020-10-27 RX ORDER — OMEPRAZOLE 20 MG/1
20 CAPSULE, DELAYED RELEASE ORAL DAILY
Qty: 90 CAPSULE | Refills: 3 | Status: SHIPPED | OUTPATIENT
Start: 2020-10-27 | End: 2021-12-20

## 2020-10-27 RX ORDER — DICLOFENAC SODIUM 75 MG/1
75 TABLET, DELAYED RELEASE ORAL 2 TIMES DAILY
Qty: 180 TABLET | Refills: 3 | Status: SHIPPED | OUTPATIENT
Start: 2020-10-27 | End: 2021-12-20

## 2020-11-03 PROBLEM — R55 EPISODE OF SYNCOPE: Status: RESOLVED | Noted: 2017-05-01 | Resolved: 2020-11-03

## 2021-01-07 ENCOUNTER — TELEPHONE (OUTPATIENT)
Dept: PRIMARY CARE CLINIC | Age: 86
End: 2021-01-07

## 2021-01-07 DIAGNOSIS — Z12.31 ENCOUNTER FOR SCREENING MAMMOGRAM FOR BREAST CANCER: Primary | ICD-10-CM

## 2021-01-11 ENCOUNTER — TELEPHONE (OUTPATIENT)
Dept: PRIMARY CARE CLINIC | Age: 86
End: 2021-01-11

## 2021-01-11 NOTE — TELEPHONE ENCOUNTER
Tell her just to continue to take meds as she has been and f/u in office in a few weeks.   Have her bring her BP cuff with her to that appt

## 2021-01-11 NOTE — TELEPHONE ENCOUNTER
Pt notified with message and instructions below. Pt is not on any Bp meds, wanted to make sure you were aware.

## 2021-01-11 NOTE — TELEPHONE ENCOUNTER
Pt was to call with BP readings. 1/7  160/69 P-69 am  1/7 146/55 P-70 pm    1/8 151/69 p-77  115/59 P-84    1/9 134/61 P-79 am 8:30am  1/9 148/66 P-77    1/10 139/71 P-78 am      1/11 130/66  P-90 am  5 min later 136/82    Please Advise.

## 2021-01-12 ENCOUNTER — TELEPHONE (OUTPATIENT)
Dept: PRIMARY CARE CLINIC | Age: 86
End: 2021-01-12

## 2021-01-12 NOTE — TELEPHONE ENCOUNTER
She doesn't have high potassium that I can see- not sure what they are referring to. I do not remember telling her to see endo. Maybe she should come in for appt.  With her daughter to discuss

## 2021-01-12 NOTE — TELEPHONE ENCOUNTER
[facility] called stating pt called them to set up an appt and the pt sounded very confused. I do not see any mention of a referral or even a dx to require an endo appt. Please advise. I did call pt and make answered, he will have her call back.

## 2021-01-12 NOTE — TELEPHONE ENCOUNTER
Pt calling and states that she has been having high BP and states that she called the Endocrinologist and was told that she needs a referral to be seen.    Her daughter got on the phone and states that the referral is not for the BP but for the high potassium which could be caused by the adrenal glands and was recommended to see a endocrinologist and she put this off, now she wants to have the referral.

## 2021-01-14 ENCOUNTER — OFFICE VISIT (OUTPATIENT)
Dept: PRIMARY CARE CLINIC | Age: 86
End: 2021-01-14
Payer: COMMERCIAL

## 2021-01-14 VITALS
HEART RATE: 99 BPM | WEIGHT: 168.2 LBS | DIASTOLIC BLOOD PRESSURE: 78 MMHG | BODY MASS INDEX: 29.8 KG/M2 | OXYGEN SATURATION: 97 % | HEIGHT: 63 IN | SYSTOLIC BLOOD PRESSURE: 146 MMHG | TEMPERATURE: 97.4 F

## 2021-01-14 DIAGNOSIS — I95.1 ORTHOSTATIC HYPOTENSION: Primary | ICD-10-CM

## 2021-01-14 DIAGNOSIS — N28.9 ACUTE RENAL INSUFFICIENCY: ICD-10-CM

## 2021-01-14 PROCEDURE — 81003 URINALYSIS AUTO W/O SCOPE: CPT | Performed by: FAMILY MEDICINE

## 2021-01-14 PROCEDURE — 99213 OFFICE O/P EST LOW 20 MIN: CPT | Performed by: FAMILY MEDICINE

## 2021-01-14 SDOH — ECONOMIC STABILITY: TRANSPORTATION INSECURITY
IN THE PAST 12 MONTHS, HAS THE LACK OF TRANSPORTATION KEPT YOU FROM MEDICAL APPOINTMENTS OR FROM GETTING MEDICATIONS?: NO

## 2021-01-14 SDOH — ECONOMIC STABILITY: TRANSPORTATION INSECURITY
IN THE PAST 12 MONTHS, HAS LACK OF TRANSPORTATION KEPT YOU FROM MEETINGS, WORK, OR FROM GETTING THINGS NEEDED FOR DAILY LIVING?: NO

## 2021-01-14 SDOH — ECONOMIC STABILITY: FOOD INSECURITY: WITHIN THE PAST 12 MONTHS, YOU WORRIED THAT YOUR FOOD WOULD RUN OUT BEFORE YOU GOT MONEY TO BUY MORE.: NEVER TRUE

## 2021-01-14 SDOH — ECONOMIC STABILITY: FOOD INSECURITY: WITHIN THE PAST 12 MONTHS, THE FOOD YOU BOUGHT JUST DIDN'T LAST AND YOU DIDN'T HAVE MONEY TO GET MORE.: NEVER TRUE

## 2021-01-14 ASSESSMENT — ENCOUNTER SYMPTOMS
SORE THROAT: 0
VOMITING: 0
DIARRHEA: 1
ABDOMINAL PAIN: 0
NAUSEA: 0
WHEEZING: 0
EYE DISCHARGE: 0
CONSTIPATION: 1
SHORTNESS OF BREATH: 0
EYE REDNESS: 0
RHINORRHEA: 0
COUGH: 0

## 2021-01-14 ASSESSMENT — PATIENT HEALTH QUESTIONNAIRE - PHQ9
SUM OF ALL RESPONSES TO PHQ QUESTIONS 1-9: 0
SUM OF ALL RESPONSES TO PHQ9 QUESTIONS 1 & 2: 0
1. LITTLE INTEREST OR PLEASURE IN DOING THINGS: 0
2. FEELING DOWN, DEPRESSED OR HOPELESS: 0

## 2021-01-14 NOTE — PROGRESS NOTES
[address] [facility] PRIMARY [facility]  [address] [place] [id number]  Dept: Favian Burleson Amber Marcus is a 80 y.o. female Established patient, who presents today for her medical conditions/complaintsas noted below. Chief Complaint   Patient presents with    Medication Check       HPI:     HPI- [facility]. Has not felt good for a while. Diarrhea and constipation. Headaches when BP is high. Fatigue/ feels yucky. Would like to see nephrology and endo (f/h DM). Pt was not aware we were checking her sugars regularly. Reviewed prior notes None  Reviewed previous Labs    LDL Calculated (mg/dL)   Date Value   06/04/2018 135   11/19/2013 154   11/14/2012 149       (goal LDL is <100)   AST (U/L)   Date Value   11/14/2012 17     ALT (U/L)   Date Value   11/14/2012 15     BUN (mg/dL)   Date Value   11/19/2013 17     Hemoglobin A1C (%)   Date Value   11/19/2013 5.6     BP Readings from Last 3 Encounters:   01/14/21 (!) 146/78   09/21/20 122/80   06/12/20 120/78          (goal 120/80)    Past Medical History:   Diagnosis Date    Basal cell carcinoma     mulitple    Hyperlipidemia 8/30/2011    Mild hyperglycemia. 8/30/2011    Orthostatic hypotension 8/30/2011    Osteoarthritis     knees, thumbs    Post-menopausal     Pre-cancerous skin lesions.  8/30/2011    Psoriasis 8/30/2011    Syncope, non cardiac     \"vascular syncope\" per patient      Past Surgical History:   Procedure Laterality Date    APPENDECTOMY      during hyst    COLON SURGERY      fistula repair    COLONOSCOPY  1997, 2002    FOOT SURGERY      hammartoe    HYSTERECTOMY      complete, d/t fibroids    JOINT REPLACEMENT  2001    Right and left knees    SIGMOIDOSCOPY  2016    SKIN CANCER DESTRUCTION      basal cell    SKIN CANCER EXCISION      basal cell       Family History   Problem Relation Age of Onset    Cancer Other         ovarian    Breast Cancer Mother     Cancer Sister         melanoma    Diabetes Sister     Cancer Brother         kidney    Cancer Maternal Aunt         melanoma    Prostate Cancer Maternal Grandfather     Diabetes Sister     Heart Disease Father         angina    Diabetes Maternal Grandmother     Cancer Brother         colon       Social History     Tobacco Use    Smoking status: Never Smoker    Smokeless tobacco: Never Used   Substance Use Topics    Alcohol use: No      Current Outpatient Medications   Medication Sig Dispense Refill    omeprazole (PRILOSEC) 20 MG delayed release capsule Take 1 capsule by mouth daily 90 capsule 3    diclofenac (VOLTAREN) 75 MG EC tablet Take 1 tablet by mouth 2 times daily 180 tablet 3    fluocinolone acetonide (SYNALAR) 0.01 % external solution Apply topically 2 times daily Apply topically 2 times daily. 1 Bottle 1    Omega-3 Fatty Acids (FISH OIL) 1000 MG CAPS Take 3,000 mg by mouth 3 times daily      midodrine (PROAMATINE) 2.5 MG tablet Take 1 tablet by mouth every 4 hours as needed (near syncope) Indications: Dr Catia Carvajal only wants pt taking 2.5 mg 90 tablet 0    vitamin D (CHOLECALCIFEROL) 1000 UNIT TABS tablet Take 3,000 Units by mouth daily      Docusate Calcium (STOOL SOFTENER PO) Take 1 tablet by mouth daily      niacin 100 MG tablet Take 100 mg by mouth daily (with breakfast)      Calcium Carbonate-Vitamin D (CALCIUM + D) 600-200 MG-UNIT TABS Take  by mouth.  therapeutic multivitamin-minerals (THERAGRAN-M) tablet Take 1 tablet by mouth daily.  aspirin 81 MG EC tablet Take 81 mg by mouth daily. No current facility-administered medications for this visit. Allergies   Allergen Reactions    Adhesive Tape      Tears her skin    Anesthetic [Benzocaine]      Difficulty waking    Benzocaine-Benzethonium      Difficulty waking    Pcn [Penicillins] Rash     Other reaction(s):  Intolerance       Health Maintenance   Topic Date Due    DTaP/Tdap/Td vaccine (1 - Tdap) 07/09/1954    Shingles Vaccine (2 of 3) 01/28/2016    Annual Wellness Visit (AWV)  05/29/2019    DEXA (modify frequency per FRAX score)  Completed    Flu vaccine  Completed    Pneumococcal 65+ years Vaccine  Completed    Hepatitis A vaccine  Aged Out    Hepatitis B vaccine  Aged Out    Hib vaccine  Aged Out    Meningococcal (ACWY) vaccine  Aged Out       Subjective:      Review of Systems   Constitutional: Positive for diaphoresis (her normal) and fatigue. Negative for chills and fever. HENT: Negative for congestion, rhinorrhea and sore throat. Eyes: Negative for discharge and redness. Respiratory: Negative for cough, shortness of breath and wheezing. Cardiovascular: Negative for chest pain and palpitations. Gastrointestinal: Positive for constipation and diarrhea. Negative for abdominal pain, nausea and vomiting. Genitourinary: Negative for dysuria and frequency. Musculoskeletal: Negative for arthralgias and myalgias. Neurological: Positive for headaches. Negative for dizziness and light-headedness. Psychiatric/Behavioral: Negative for sleep disturbance. Objective:     BP (!) 146/78   Pulse 99   Temp 97.4 °F (36.3 °C)   Ht 5' 3\" (1.6 m)   Wt 168 lb 3.2 oz (76.3 kg)   SpO2 97%   BMI 29.80 kg/m²   Physical Exam  Vitals signs and nursing note reviewed. Constitutional:       General: She is not in acute distress. Appearance: She is well-developed. She is not ill-appearing. HENT:      Head: Normocephalic and atraumatic. Right Ear: External ear normal.      Left Ear: External ear normal.   Eyes:      General: No scleral icterus. Right eye: No discharge. Left eye: No discharge. Conjunctiva/sclera: Conjunctivae normal.      Pupils: Pupils are equal, round, and reactive to light. Neck:      Thyroid: No thyromegaly. Trachea: No tracheal deviation. Cardiovascular:      Rate and Rhythm: Normal rate and regular rhythm. Heart sounds: Normal heart sounds. Pulmonary:      Effort: Pulmonary effort is normal. No respiratory distress. Breath sounds: Normal breath sounds. No wheezing. Lymphadenopathy:      Cervical: No cervical adenopathy. Skin:     General: Skin is warm. Findings: No rash. Neurological:      Mental Status: She is alert and oriented to person, place, and time. Psychiatric:         Mood and Affect: Mood normal.         Behavior: Behavior normal.         Thought Content: Thought content normal.         Assessment:       Diagnosis Orders   1. Orthostatic hypotension  External Referral To Nephrology    POCT Urinalysis No Micro (Auto)   2. Acute renal insufficiency  External Referral To Nephrology    POCT Urinalysis No Micro (Auto)        Plan:    pt advised she just needs to stay hydrated and avoid NSAIDs, but would still like to see her 's nephrologist. Referral entered. Take BP at home and call if really out of range. No need for endo referral  Return in about 3 months (around 4/14/2021) for HTN f/u. Orders Placed This Encounter   Procedures    External Referral To Nephrology     Referral Priority:   Routine     Referral Type:   Eval and Treat     Referral Reason:   Specialty Services Required     Referred to Provider:   Reynold Vizcarra MD     Requested Specialty:   Nephrology     Number of Visits Requested:   1    POCT Urinalysis No Micro (Auto)     No orders of the defined types were placed in this encounter. Patient given educationalmaterials - see patient instructions. Discussed use, benefit, and side effectsof prescribed medications. All patient questions answered. Pt voiced understanding. Reviewed health maintenance. Instructed to continue current medications, diet andexercise. Patient agreed with treatment plan. Follow up as directed.      Electronicallysigned by Deyanira Espinal MD on 1/14/2021 at 12:18 PM

## 2021-03-23 DIAGNOSIS — Z12.31 ENCOUNTER FOR SCREENING MAMMOGRAM FOR BREAST CANCER: ICD-10-CM

## 2021-04-16 ENCOUNTER — OFFICE VISIT (OUTPATIENT)
Dept: PRIMARY CARE CLINIC | Age: 86
End: 2021-04-16
Payer: COMMERCIAL

## 2021-04-16 VITALS
OXYGEN SATURATION: 97 % | SYSTOLIC BLOOD PRESSURE: 128 MMHG | WEIGHT: 169.4 LBS | BODY MASS INDEX: 30.01 KG/M2 | DIASTOLIC BLOOD PRESSURE: 76 MMHG | HEART RATE: 89 BPM

## 2021-04-16 DIAGNOSIS — M15.9 PRIMARY OSTEOARTHRITIS INVOLVING MULTIPLE JOINTS: Primary | ICD-10-CM

## 2021-04-16 DIAGNOSIS — R55 VASOVAGAL SYNCOPE: ICD-10-CM

## 2021-04-16 DIAGNOSIS — K21.9 GASTROESOPHAGEAL REFLUX DISEASE WITHOUT ESOPHAGITIS: ICD-10-CM

## 2021-04-16 DIAGNOSIS — L30.9 DERMATITIS: ICD-10-CM

## 2021-04-16 PROCEDURE — 99214 OFFICE O/P EST MOD 30 MIN: CPT | Performed by: FAMILY MEDICINE

## 2021-04-16 RX ORDER — CLOTRIMAZOLE AND BETAMETHASONE DIPROPIONATE 10; .64 MG/G; MG/G
CREAM TOPICAL
Qty: 45 G | Refills: 0 | Status: SHIPPED | OUTPATIENT
Start: 2021-04-16

## 2021-04-16 ASSESSMENT — ENCOUNTER SYMPTOMS
COUGH: 0
SHORTNESS OF BREATH: 0
VOMITING: 0
NAUSEA: 0
RHINORRHEA: 0
DIARRHEA: 0
SORE THROAT: 0
WHEEZING: 0
EYE DISCHARGE: 0
ABDOMINAL PAIN: 0
EYE REDNESS: 0

## 2021-04-16 ASSESSMENT — PATIENT HEALTH QUESTIONNAIRE - PHQ9
1. LITTLE INTEREST OR PLEASURE IN DOING THINGS: 0
SUM OF ALL RESPONSES TO PHQ QUESTIONS 1-9: 0
SUM OF ALL RESPONSES TO PHQ QUESTIONS 1-9: 0

## 2021-04-16 NOTE — PROGRESS NOTES
Relation Age of Onset    Cancer Other         ovarian    Breast Cancer Mother     Cancer Sister         melanoma    Diabetes Sister     Cancer Brother         kidney    Cancer Maternal Aunt         melanoma    Prostate Cancer Maternal Grandfather     Diabetes Sister     Heart Disease Father         angina    Diabetes Maternal Grandmother     Cancer Brother         colon       Social History     Tobacco Use    Smoking status: Never Smoker    Smokeless tobacco: Never Used   Substance Use Topics    Alcohol use: No      Current Outpatient Medications   Medication Sig Dispense Refill    clotrimazole-betamethasone (LOTRISONE) 1-0.05 % cream Apply topically 2 times daily. 45 g 0    omeprazole (PRILOSEC) 20 MG delayed release capsule Take 1 capsule by mouth daily 90 capsule 3    diclofenac (VOLTAREN) 75 MG EC tablet Take 1 tablet by mouth 2 times daily (Patient taking differently: Take 75 mg by mouth 2 times daily 1 tab every other day) 180 tablet 3    Omega-3 Fatty Acids (FISH OIL) 1000 MG CAPS Take 3,000 mg by mouth 3 times daily      midodrine (PROAMATINE) 2.5 MG tablet Take 1 tablet by mouth every 4 hours as needed (near syncope) Indications: Dr Wing Bryan only wants pt taking 2.5 mg 90 tablet 0    vitamin D (CHOLECALCIFEROL) 1000 UNIT TABS tablet Take 3,000 Units by mouth daily      Docusate Calcium (STOOL SOFTENER PO) Take 1 tablet by mouth daily      niacin 100 MG tablet Take 100 mg by mouth daily (with breakfast)      therapeutic multivitamin-minerals (THERAGRAN-M) tablet Take 1 tablet by mouth daily.  aspirin 81 MG EC tablet Take 81 mg by mouth daily.  fluocinolone acetonide (SYNALAR) 0.01 % external solution Apply topically 2 times daily Apply topically 2 times daily. (Patient not taking: Reported on 4/16/2021) 1 Bottle 1    Calcium Carbonate-Vitamin D (CALCIUM + D) 600-200 MG-UNIT TABS Take  by mouth. No current facility-administered medications for this visit. Allergies   Allergen Reactions    Adhesive Tape      Tears her skin    Anesthetic [Benzocaine]      Difficulty waking    Benzocaine-Benzethonium      Difficulty waking    Pcn [Penicillins] Rash     Other reaction(s): Intolerance       Health Maintenance   Topic Date Due    DTaP/Tdap/Td vaccine (1 - Tdap) Never done    Shingles Vaccine (2 of 3) 01/28/2016    DEXA (modify frequency per FRAX score)  Completed    Flu vaccine  Completed    Pneumococcal 65+ years Vaccine  Completed    COVID-19 Vaccine  Completed    Hepatitis A vaccine  Aged Out    Hepatitis B vaccine  Aged Out    Hib vaccine  Aged Out    Meningococcal (ACWY) vaccine  Aged Out       Subjective:      Review of Systems   Constitutional: Negative for chills and fever. HENT: Negative for rhinorrhea and sore throat. Eyes: Negative for discharge and redness. Respiratory: Negative for cough, shortness of breath and wheezing. Cardiovascular: Negative for chest pain and palpitations. Gastrointestinal: Negative for abdominal pain, diarrhea, nausea and vomiting. Genitourinary: Negative for dysuria and frequency. Musculoskeletal: Positive for arthralgias. Negative for myalgias. Neurological: Negative for dizziness, light-headedness and headaches. Psychiatric/Behavioral: Negative for sleep disturbance. Objective:     /76   Pulse 89   Wt 169 lb 6.4 oz (76.8 kg)   SpO2 97%   BMI 30.01 kg/m²   Physical Exam  Vitals signs and nursing note reviewed. Constitutional:       General: She is not in acute distress. Appearance: She is well-developed. She is not ill-appearing. HENT:      Head: Normocephalic and atraumatic. Right Ear: External ear normal.      Left Ear: External ear normal.   Eyes:      General: No scleral icterus. Right eye: No discharge. Left eye: No discharge. Conjunctiva/sclera: Conjunctivae normal.      Pupils: Pupils are equal, round, and reactive to light.    Neck: Thyroid: No thyromegaly. Trachea: No tracheal deviation. Cardiovascular:      Rate and Rhythm: Normal rate and regular rhythm. Heart sounds: Normal heart sounds. Pulmonary:      Effort: Pulmonary effort is normal. No respiratory distress. Breath sounds: Normal breath sounds. No wheezing. Lymphadenopathy:      Cervical: No cervical adenopathy. Skin:     General: Skin is warm. Findings: Rash (eryth under breasts) present. Neurological:      Mental Status: She is alert and oriented to person, place, and time. Psychiatric:         Mood and Affect: Mood normal.         Behavior: Behavior normal.         Thought Content: Thought content normal.         Assessment:       Diagnosis Orders   1. Primary osteoarthritis involving multiple joints     2. Vasovagal syncope     3. Gastroesophageal reflux disease without esophagitis     4. Dermatitis      lotrisone        1. Primary osteoarthritis involving multiple joints  Assessment & Plan:   Well-controlled, continue current treatment plan and taking tylenol and voltaren alternately every other day. 2. Vasovagal syncope  Assessment & Plan:   Well-controlled, continue current treatment plan, follows with cardiology but has not gotten results of echo yet from Dr. Fe Beth  3. Gastroesophageal reflux disease without esophagitis  Assessment & Plan:   Uncontrolled, increase omeprazole  4. Dermatitis  Comments:  lotrisone     Plan:       Patient Instructions   Take omeprazole twice a day for a week or so and see if stomach gets better     No follow-ups on file. No orders of the defined types were placed in this encounter. Orders Placed This Encounter   Medications    clotrimazole-betamethasone (LOTRISONE) 1-0.05 % cream     Sig: Apply topically 2 times daily. Dispense:  45 g     Refill:  0       Patient given educational materials - see patient instructions. Discussed use, benefit, and side effects of prescribed medications.   All patient questions answered. Pt voiced understanding. Reviewed health maintenance. Instructed to continue current medications, diet and exercise. Patient agreed with treatment plan. Follow up as directed.      Electronically signed by Marbella Hilliard MD on 4/16/2021 at 1:26 PM

## 2021-04-16 NOTE — ASSESSMENT & PLAN NOTE
Well-controlled, continue current treatment plan and taking tylenol and voltaren alternately every other day.

## 2021-06-23 ENCOUNTER — OFFICE VISIT (OUTPATIENT)
Dept: ORTHOPEDIC SURGERY | Age: 86
End: 2021-06-23
Payer: COMMERCIAL

## 2021-06-23 DIAGNOSIS — Z96.653 H/O TOTAL KNEE REPLACEMENT, BILATERAL: Primary | ICD-10-CM

## 2021-06-23 PROCEDURE — 99213 OFFICE O/P EST LOW 20 MIN: CPT | Performed by: ORTHOPAEDIC SURGERY

## 2021-06-23 NOTE — PROGRESS NOTES
Matilde Price M.D.            [address] [facility] [address] [facility] [address], [address] [facility]           Dept Phone: [phone]           Dept Fax:  [phone] [address] [facility]           [place] Mejia          Dept Phone: [phone]           Dept Fax:  [phone]      Chief Compliant:  Chief Complaint   Patient presents with    Pain     H/O Bilateral TKA        History of Present Illness: This is a 80 y.o. female who presents to the clinic today for evaluation / follow up of bilateral total knees done in 2001. She has no complaints at all. Thing that she notices that her left foot wants to turn out a little bit but she has no pain whatsoever. Review of Systems   Constitutional: Negative for fever, chills, sweats. Eyes: Negative for changes in vision, or pain. HENT: Negative for ear ache, epistaxis, or sore throat. Respiratory/Cardio: Negative for Chest pain, palpitations, SOB, or cough. Gastrointestinal: Negative for abdominal pain, N/V/D. Genitourinary: Negative for dysuria, frequency, urgency, or hematuria. Neurological: Negative for headache, numbness, or weakness. Integumentary: Negative for rash, itching, laceration, or abrasion. Musculoskeletal: Positive for Pain (H/O Bilateral TKA)       Physical Exam:  Constitutional: Patient is oriented to person, place, and time. Patient appears well-developed and well nourished. HENT: Negative otherwise noted  Head: Normocephalic and Atraumatic  Nose: Normal  Eyes: Conjunctivae and EOM are normal  Neck: Normal range of motion Neck supple. Respiratory/Cardio: Effort normal. No respiratory distress. Musculoskeletal: Lamination both knees identical.  Motion is superb 0-135 degrees. She has good varus and valgus as well as anterior posterior stability.   She has good patellar tracking no effusion present noted no hip rotational pain on either side noted. Neurological: Patient is alert and oriented to person, place, and time. Normal strenght. No sensory deficit. Skin: Skin is warm and dry  Psychiatric: Behavior is normal. Thought content normal.  Nursing note and vitals reviewed. Labs and Imaging:     XR taken today:  XR KNEE LEFT (1-2 VIEWS)    Result Date: 6/23/2021  X-rays taken today reviewed by me show standing AP of both knees which show bilateral total knee components in excellent position of both views. Is been no interval change. Lateral view of the left knee also is noted to have no interval change    XR KNEE RIGHT (1-2 VIEWS)    Result Date: 6/23/2021  X-rays taken today reviewed by me show standing AP and lateral of the right knee. Patient is status post right total knee arthroplasty components are in good position AP and lateral views. There is no interval change no evidence for any significant osteolysis subsidence or polyethylene wear          Orders Placed This Encounter   Procedures    XR KNEE RIGHT (1-2 VIEWS)     Standing Status:   Future     Number of Occurrences:   1     Standing Expiration Date:   6/21/2022    XR KNEE LEFT (1-2 VIEWS)     Standing Status:   Future     Number of Occurrences:   1     Standing Expiration Date:   6/21/2022       Assessment and Plan:  1. H/O total knee replacement, bilateral            This is a 80 y.o. female who presents to the clinic today for evaluation / follow up of that is post bilateral total knees done in 2001 and well. Past History:    Current Outpatient Medications:     clotrimazole-betamethasone (LOTRISONE) 1-0.05 % cream, Apply topically 2 times daily. , Disp: 45 g, Rfl: 0    omeprazole (PRILOSEC) 20 MG delayed release capsule, Take 1 capsule by mouth daily, Disp: 90 capsule, Rfl: 3    diclofenac (VOLTAREN) 75 MG EC tablet, Take 1 tablet by mouth 2 times daily (Patient taking differently: Take 75 mg by mouth 2 times daily 1 tab every other day), Disp: 180 tablet, Rfl: 3    fluocinolone acetonide (SYNALAR) 0.01 % external solution, Apply topically 2 times daily Apply topically 2 times daily. (Patient not taking: Reported on 4/16/2021), Disp: 1 Bottle, Rfl: 1    Omega-3 Fatty Acids (FISH OIL) 1000 MG CAPS, Take 3,000 mg by mouth 3 times daily, Disp: , Rfl:     midodrine (PROAMATINE) 2.5 MG tablet, Take 1 tablet by mouth every 4 hours as needed (near syncope) Indications: Dr Sarah Beth only wants pt taking 2.5 mg, Disp: 90 tablet, Rfl: 0    vitamin D (CHOLECALCIFEROL) 1000 UNIT TABS tablet, Take 3,000 Units by mouth daily, Disp: , Rfl:     Docusate Calcium (STOOL SOFTENER PO), Take 1 tablet by mouth daily, Disp: , Rfl:     niacin 100 MG tablet, Take 100 mg by mouth daily (with breakfast), Disp: , Rfl:     Calcium Carbonate-Vitamin D (CALCIUM + D) 600-200 MG-UNIT TABS, Take  by mouth.  , Disp: , Rfl:     therapeutic multivitamin-minerals (THERAGRAN-M) tablet, Take 1 tablet by mouth daily. , Disp: , Rfl:     aspirin 81 MG EC tablet, Take 81 mg by mouth daily. , Disp: , Rfl:   Allergies   Allergen Reactions    Adhesive Tape      Tears her skin    Anesthetic [Benzocaine]      Difficulty waking    Benzocaine-Benzethonium      Difficulty waking    Pcn [Penicillins] Rash     Other reaction(s):  Intolerance     Social History     Socioeconomic History    Marital status:      Spouse name: Not on file    Number of children: Not on file    Years of education: Not on file    Highest education level: Not on file   Occupational History    Not on file   Tobacco Use    Smoking status: Never Smoker    Smokeless tobacco: Never Used   Substance and Sexual Activity    Alcohol use: No    Drug use: No    Sexual activity: Not on file   Other Topics Concern    Not on file   Social History Narrative    Not on file     Social Determinants of Health     Financial Resource Strain: Low Risk     Difficulty of Paying Living Expenses: Not hard at all   Food Insecurity: No Food Insecurity    Worried About Running Out of Food in the Last Year: Never true    Ran Out of Food in the Last Year: Never true   Transportation Needs: No Transportation Needs    Lack of Transportation (Medical): No    Lack of Transportation (Non-Medical): No   Physical Activity:     Days of Exercise per Week:     Minutes of Exercise per Session:    Stress:     Feeling of Stress :    Social Connections:     Frequency of Communication with Friends and Family:     Frequency of Social Gatherings with Friends and Family:     Attends Church Services:     Active Member of Clubs or Organizations:     Attends Club or Organization Meetings:     Marital Status:    Intimate Partner Violence:     Fear of Current or Ex-Partner:     Emotionally Abused:     Physically Abused:     Sexually Abused:      Past Medical History:   Diagnosis Date    Basal cell carcinoma     mulitple    Hyperlipidemia 8/30/2011    Mild hyperglycemia. 8/30/2011    Orthostatic hypotension 8/30/2011    Osteoarthritis     knees, thumbs    Post-menopausal     Pre-cancerous skin lesions.  8/30/2011    Psoriasis 8/30/2011    Syncope, non cardiac     \"vascular syncope\" per patient     Past Surgical History:   Procedure Laterality Date    APPENDECTOMY      during hyst    COLON SURGERY      fistula repair    COLONOSCOPY  1997, 2002    FOOT SURGERY      hammartoe    HYSTERECTOMY      complete, d/t fibroids    JOINT REPLACEMENT  2001    Right and left knees    SIGMOIDOSCOPY  2016    SKIN CANCER DESTRUCTION      basal cell    SKIN CANCER EXCISION      basal cell     Family History   Problem Relation Age of Onset    Cancer Other         ovarian    Breast Cancer Mother     Cancer Sister         melanoma    Diabetes Sister     Cancer Brother         kidney    Cancer Maternal Aunt         melanoma    Prostate Cancer Maternal Grandfather     Diabetes Sister     Heart Disease Father         angina    Diabetes Maternal Grandmother     Cancer Brother         colon   Plan  Patient to continue exercises. I instructed that her knee going out the side typically represents something going on her hip or back but she has no complaints. Continue exercises back here in 2 years      Provider Attestation:  Misael Hurley, personally performed the services described in this documentation. All medical record entries made by the scribe were at my direction and in my presence. I have reviewed the chart and discharge instructions and agree that the records reflect my personal performance and is accurate and complete. Sindy Galarza MD. 06/23/21      Please note that this chart was generated using voice recognition Dragon dictation software. Although every effort was made to ensure the accuracy of this automated transcription, some errors in transcription may have occurred.

## 2021-07-21 ENCOUNTER — OFFICE VISIT (OUTPATIENT)
Dept: PRIMARY CARE CLINIC | Age: 86
End: 2021-07-21
Payer: COMMERCIAL

## 2021-07-21 VITALS
SYSTOLIC BLOOD PRESSURE: 138 MMHG | WEIGHT: 168.8 LBS | BODY MASS INDEX: 29.9 KG/M2 | HEART RATE: 83 BPM | DIASTOLIC BLOOD PRESSURE: 70 MMHG | OXYGEN SATURATION: 98 %

## 2021-07-21 DIAGNOSIS — R03.0 ELEVATED BLOOD PRESSURE READING: ICD-10-CM

## 2021-07-21 DIAGNOSIS — R55 VASOVAGAL SYNCOPE: ICD-10-CM

## 2021-07-21 PROCEDURE — 99213 OFFICE O/P EST LOW 20 MIN: CPT | Performed by: FAMILY MEDICINE

## 2021-07-21 ASSESSMENT — ENCOUNTER SYMPTOMS
EYE DISCHARGE: 0
COUGH: 0
SHORTNESS OF BREATH: 0
RHINORRHEA: 0
VOMITING: 0
ABDOMINAL PAIN: 0
WHEEZING: 0
DIARRHEA: 0
EYE REDNESS: 0
NAUSEA: 0
SORE THROAT: 0

## 2021-07-21 NOTE — PROGRESS NOTES
717 [facility] PRIMARY CARE  [address] [place] [id number]  Dept: Favian Burleson Donavan Stevens is a 80 y.o. female Established patient, who presents today for her medical conditions/complaints as noted below. Chief Complaint   Patient presents with    Medication Check    Hypertension     Patient checks B/P at home       HPI:     HPI- pt got a rash on chest, then legs and arms and now the skin is peeling. Using lotion- lubirderm. No itching now (did at first)    Reviewed prior notes None  Reviewed previous Labs    LDL Calculated (mg/dL)   Date Value   06/04/2018 135   11/19/2013 154   11/14/2012 149       (goal LDL is <100)   AST (U/L)   Date Value   11/14/2012 17     ALT (U/L)   Date Value   11/14/2012 15     BUN (mg/dL)   Date Value   11/19/2013 17     Hemoglobin A1C (%)   Date Value   11/19/2013 5.6     BP Readings from Last 3 Encounters:   07/21/21 138/70   04/16/21 128/76   01/14/21 (!) 146/78          (goal 120/80)    Past Medical History:   Diagnosis Date    Basal cell carcinoma     mulitple    Hyperlipidemia 8/30/2011    Mild hyperglycemia. 8/30/2011    Orthostatic hypotension 8/30/2011    Osteoarthritis     knees, thumbs    Post-menopausal     Pre-cancerous skin lesions.  8/30/2011    Psoriasis 8/30/2011    Syncope, non cardiac     \"vascular syncope\" per patient      Past Surgical History:   Procedure Laterality Date    APPENDECTOMY      during hyst    COLON SURGERY      fistula repair    COLONOSCOPY  1997, 2002    FOOT SURGERY      hammartoe    HYSTERECTOMY      complete, d/t fibroids    JOINT REPLACEMENT  2001    Right and left knees    SIGMOIDOSCOPY  2016    SKIN CANCER DESTRUCTION      basal cell    SKIN CANCER EXCISION      basal cell       Family History   Problem Relation Age of Onset    Cancer Other         ovarian    Breast Cancer Mother     Cancer Sister         melanoma    Diabetes Sister     Cancer Brother kidney    Cancer Maternal Aunt         melanoma    Prostate Cancer Maternal Grandfather     Diabetes Sister     Heart Disease Father         angina    Diabetes Maternal Grandmother     Cancer Brother         colon       Social History     Tobacco Use    Smoking status: Never Smoker    Smokeless tobacco: Never Used   Substance Use Topics    Alcohol use: No      Current Outpatient Medications   Medication Sig Dispense Refill    clotrimazole-betamethasone (LOTRISONE) 1-0.05 % cream Apply topically 2 times daily. 45 g 0    omeprazole (PRILOSEC) 20 MG delayed release capsule Take 1 capsule by mouth daily 90 capsule 3    diclofenac (VOLTAREN) 75 MG EC tablet Take 1 tablet by mouth 2 times daily (Patient taking differently: Take 75 mg by mouth 2 times daily 1 tab every other day) 180 tablet 3    midodrine (PROAMATINE) 2.5 MG tablet Take 1 tablet by mouth every 4 hours as needed (near syncope) Indications: Dr Jennifer Alanis only wants pt taking 2.5 mg 90 tablet 0    vitamin D (CHOLECALCIFEROL) 1000 UNIT TABS tablet Take 3,000 Units by mouth daily      Docusate Calcium (STOOL SOFTENER PO) Take 1 tablet by mouth daily      niacin 100 MG tablet Take 100 mg by mouth daily (with breakfast)      Calcium Carbonate-Vitamin D (CALCIUM + D) 600-200 MG-UNIT TABS Take  by mouth.  therapeutic multivitamin-minerals (THERAGRAN-M) tablet Take 1 tablet by mouth daily.  aspirin 81 MG EC tablet Take 81 mg by mouth daily.  fluocinolone acetonide (SYNALAR) 0.01 % external solution Apply topically 2 times daily Apply topically 2 times daily. (Patient not taking: Reported on 7/21/2021) 1 Bottle 1    Omega-3 Fatty Acids (FISH OIL) 1000 MG CAPS Take 3,000 mg by mouth 3 times daily (Patient not taking: Reported on 7/21/2021)       No current facility-administered medications for this visit.      Allergies   Allergen Reactions    Adhesive Tape      Tears her skin    Anesthetic [Benzocaine]      Difficulty waking    Benzocaine-Benzethonium      Difficulty waking    Pcn [Penicillins] Rash     Other reaction(s): Intolerance       Health Maintenance   Topic Date Due    DTaP/Tdap/Td vaccine (1 - Tdap) Never done    Shingles Vaccine (2 of 3) 01/28/2016    Flu vaccine (1) 09/01/2021    Pneumococcal 65+ years Vaccine  Completed    COVID-19 Vaccine  Completed    Hepatitis A vaccine  Aged Out    Hepatitis B vaccine  Aged Out    Hib vaccine  Aged Out    Meningococcal (ACWY) vaccine  Aged Out       Subjective:      Review of Systems   Constitutional: Negative for chills and fever. HENT: Negative for rhinorrhea and sore throat. Eyes: Negative for discharge and redness. Respiratory: Negative for cough, shortness of breath and wheezing. Cardiovascular: Negative for chest pain and palpitations. Gastrointestinal: Negative for abdominal pain, diarrhea, nausea and vomiting. Genitourinary: Negative for dysuria and frequency. Musculoskeletal: Positive for arthralgias (feet). Negative for myalgias. Neurological: Negative for dizziness, light-headedness and headaches. Psychiatric/Behavioral: Negative for sleep disturbance. Objective:     /70   Pulse 83   Wt 168 lb 12.8 oz (76.6 kg)   SpO2 98%   BMI 29.90 kg/m²   Physical Exam  Vitals and nursing note reviewed. Constitutional:       General: She is not in acute distress. Appearance: She is well-developed. She is not ill-appearing. HENT:      Head: Normocephalic and atraumatic. Right Ear: External ear normal.      Left Ear: External ear normal.   Eyes:      General: No scleral icterus. Right eye: No discharge. Left eye: No discharge. Conjunctiva/sclera: Conjunctivae normal.      Pupils: Pupils are equal, round, and reactive to light. Neck:      Thyroid: No thyromegaly. Trachea: No tracheal deviation. Cardiovascular:      Rate and Rhythm: Normal rate and regular rhythm. Heart sounds: Normal heart sounds. Pulmonary:      Effort: Pulmonary effort is normal. No respiratory distress. Breath sounds: Normal breath sounds. No wheezing. Lymphadenopathy:      Cervical: No cervical adenopathy. Skin:     General: Skin is warm. Findings: No rash. Neurological:      Mental Status: She is alert and oriented to person, place, and time. Psychiatric:         Mood and Affect: Mood normal.         Behavior: Behavior normal.         Thought Content: Thought content normal.         Assessment/Plan:   1. Vasovagal syncope  Assessment & Plan:   Well-controlled, continue current medications  2. Elevated blood pressure reading  Assessment & Plan:   Well-controlled, continue current medications       Return in about 3 months (around 10/21/2021) for HTN f/u. No orders of the defined types were placed in this encounter. No orders of the defined types were placed in this encounter. Patient given educational materials - see patient instructions. Discussed use, benefit, and side effects of prescribed medications. All patient questions answered. Pt voiced understanding. Reviewed health maintenance. Instructed to continue current medications, diet and exercise. Patient agreed with treatment plan. Follow up as directed.      Electronically signed by Yuri Che MD on 7/21/2021 at 1:24 PM

## 2021-10-21 ENCOUNTER — OFFICE VISIT (OUTPATIENT)
Dept: PRIMARY CARE CLINIC | Age: 86
End: 2021-10-21
Payer: COMMERCIAL

## 2021-10-21 VITALS
OXYGEN SATURATION: 97 % | BODY MASS INDEX: 30.19 KG/M2 | SYSTOLIC BLOOD PRESSURE: 138 MMHG | DIASTOLIC BLOOD PRESSURE: 72 MMHG | WEIGHT: 170.4 LBS | HEART RATE: 71 BPM

## 2021-10-21 DIAGNOSIS — I95.1 ORTHOSTATIC HYPOTENSION: Primary | ICD-10-CM

## 2021-10-21 DIAGNOSIS — Z23 NEED FOR INFLUENZA VACCINATION: ICD-10-CM

## 2021-10-21 DIAGNOSIS — E78.2 MIXED HYPERLIPIDEMIA: ICD-10-CM

## 2021-10-21 PROCEDURE — 99213 OFFICE O/P EST LOW 20 MIN: CPT | Performed by: FAMILY MEDICINE

## 2021-10-21 PROCEDURE — 90471 IMMUNIZATION ADMIN: CPT | Performed by: FAMILY MEDICINE

## 2021-10-21 PROCEDURE — 90694 VACC AIIV4 NO PRSRV 0.5ML IM: CPT | Performed by: FAMILY MEDICINE

## 2021-10-21 ASSESSMENT — ENCOUNTER SYMPTOMS
VOMITING: 0
WHEEZING: 0
SORE THROAT: 0
COUGH: 0
RHINORRHEA: 0
DIARRHEA: 0
NAUSEA: 0
SHORTNESS OF BREATH: 0
EYE REDNESS: 0
ABDOMINAL PAIN: 0
EYE DISCHARGE: 0

## 2021-10-21 NOTE — PROGRESS NOTES
717 [facility] PRIMARY CARE  [address] [place] [id number]  Dept: Favian Burleson Dinorahruchi Filemon is a 80 y.o. female Established patient, who presents today for her medical conditions/complaints as noted below. Chief Complaint   Patient presents with    Constipation       HPI:     HPI- pt having trouble with feels like she has to go but then nothing comes out. Takes fiber and stool softeners. When it does come out stool is soft. Also took senakot and that helped more. Reviewed prior notes None  Reviewed previous Labs    LDL Calculated (mg/dL)   Date Value   06/04/2018 135   11/19/2013 154   11/14/2012 149       (goal LDL is <100)   AST (U/L)   Date Value   11/14/2012 17     ALT (U/L)   Date Value   11/14/2012 15     BUN (mg/dL)   Date Value   11/19/2013 17     Hemoglobin A1C (%)   Date Value   11/19/2013 5.6     BP Readings from Last 3 Encounters:   07/21/21 138/70   04/16/21 128/76   01/14/21 (!) 146/78          (goal 120/80)    Past Medical History:   Diagnosis Date    Basal cell carcinoma     mulitple    Hyperlipidemia 8/30/2011    Mild hyperglycemia. 8/30/2011    Orthostatic hypotension 8/30/2011    Osteoarthritis     knees, thumbs    Post-menopausal     Pre-cancerous skin lesions.  8/30/2011    Psoriasis 8/30/2011    Syncope, non cardiac     \"vascular syncope\" per patient      Past Surgical History:   Procedure Laterality Date    APPENDECTOMY      during hyst    COLON SURGERY      fistula repair    COLONOSCOPY  1997, 2002    FOOT SURGERY      hammartoe    HYSTERECTOMY      complete, d/t fibroids    JOINT REPLACEMENT  2001    Right and left knees    SIGMOIDOSCOPY  2016    SKIN CANCER DESTRUCTION      basal cell    SKIN CANCER EXCISION      basal cell       Family History   Problem Relation Age of Onset    Cancer Other         ovarian    Breast Cancer Mother     Cancer Sister         melanoma    Diabetes Sister     Cancer Brother         kidney    Cancer Maternal Aunt         melanoma    Prostate Cancer Maternal Grandfather     Diabetes Sister     Heart Disease Father         angina    Diabetes Maternal Grandmother     Cancer Brother         colon       Social History     Tobacco Use    Smoking status: Never Smoker    Smokeless tobacco: Never Used   Substance Use Topics    Alcohol use: No      Current Outpatient Medications   Medication Sig Dispense Refill    clotrimazole-betamethasone (LOTRISONE) 1-0.05 % cream Apply topically 2 times daily. 45 g 0    omeprazole (PRILOSEC) 20 MG delayed release capsule Take 1 capsule by mouth daily 90 capsule 3    diclofenac (VOLTAREN) 75 MG EC tablet Take 1 tablet by mouth 2 times daily (Patient taking differently: Take 75 mg by mouth 2 times daily 1 tab every other day) 180 tablet 3    fluocinolone acetonide (SYNALAR) 0.01 % external solution Apply topically 2 times daily Apply topically 2 times daily. (Patient not taking: Reported on 7/21/2021) 1 Bottle 1    Omega-3 Fatty Acids (FISH OIL) 1000 MG CAPS Take 3,000 mg by mouth 3 times daily (Patient not taking: Reported on 7/21/2021)      midodrine (PROAMATINE) 2.5 MG tablet Take 1 tablet by mouth every 4 hours as needed (near syncope) Indications: Dr Rain Raymundo only wants pt taking 2.5 mg 90 tablet 0    vitamin D (CHOLECALCIFEROL) 1000 UNIT TABS tablet Take 3,000 Units by mouth daily      Docusate Calcium (STOOL SOFTENER PO) Take 1 tablet by mouth daily      niacin 100 MG tablet Take 100 mg by mouth daily (with breakfast)      Calcium Carbonate-Vitamin D (CALCIUM + D) 600-200 MG-UNIT TABS Take  by mouth.  therapeutic multivitamin-minerals (THERAGRAN-M) tablet Take 1 tablet by mouth daily.  aspirin 81 MG EC tablet Take 81 mg by mouth daily. No current facility-administered medications for this visit.      Allergies   Allergen Reactions    Adhesive Tape      Tears her skin    Anesthetic [Benzocaine] Difficulty waking    Benzocaine-Benzethonium      Difficulty waking    Pcn [Penicillins] Rash     Other reaction(s): Intolerance       Health Maintenance   Topic Date Due    DTaP/Tdap/Td vaccine (1 - Tdap) Never done    Shingles Vaccine (2 of 3) 01/28/2016    COVID-19 Vaccine (3 - Pfizer booster) 08/20/2021    Flu vaccine (1) 09/01/2021    Annual Wellness Visit (AWV)  10/19/2021    Pneumococcal 65+ years Vaccine  Completed    Hepatitis A vaccine  Aged Out    Hepatitis B vaccine  Aged Out    Hib vaccine  Aged Out    Meningococcal (ACWY) vaccine  Aged Out       Subjective:      Review of Systems   Constitutional: Negative for chills and fever. HENT: Negative for rhinorrhea and sore throat. Eyes: Negative for discharge and redness. Respiratory: Negative for cough, shortness of breath and wheezing. Cardiovascular: Negative for chest pain and palpitations. Gastrointestinal: Negative for abdominal pain, diarrhea, nausea and vomiting. Genitourinary: Negative for dysuria and frequency. Musculoskeletal: Negative for arthralgias and myalgias. Neurological: Negative for dizziness, light-headedness and headaches. Psychiatric/Behavioral: Positive for sleep disturbance (occas). Objective: There were no vitals taken for this visit. Physical Exam  Vitals and nursing note reviewed. Constitutional:       General: She is not in acute distress. Appearance: She is well-developed. She is not ill-appearing. HENT:      Head: Normocephalic and atraumatic. Right Ear: External ear normal.      Left Ear: External ear normal.   Eyes:      General: No scleral icterus. Right eye: No discharge. Left eye: No discharge. Conjunctiva/sclera: Conjunctivae normal.      Pupils: Pupils are equal, round, and reactive to light. Neck:      Thyroid: No thyromegaly. Trachea: No tracheal deviation. Cardiovascular:      Rate and Rhythm: Normal rate and regular rhythm.       Heart sounds: Normal heart sounds. Pulmonary:      Effort: Pulmonary effort is normal. No respiratory distress. Breath sounds: Normal breath sounds. No wheezing. Lymphadenopathy:      Cervical: No cervical adenopathy. Skin:     General: Skin is warm. Findings: No rash. Neurological:      Mental Status: She is alert and oriented to person, place, and time. Psychiatric:         Mood and Affect: Mood normal.         Behavior: Behavior normal.         Thought Content: Thought content normal.         Assessment/Plan:   1. Orthostatic hypotension  Assessment & Plan:   Well-controlled, continue current medications  2. Mixed hyperlipidemia  Assessment & Plan:   refuses meds due to reaction in        Return in about 3 months (around 1/21/2022). No orders of the defined types were placed in this encounter. No orders of the defined types were placed in this encounter. Patient given educational materials - see patient instructions. Discussed use, benefit, and side effects of prescribed medications. All patient questions answered. Pt voiced understanding. Reviewed health maintenance. Instructed to continue current medications, diet and exercise. Patient agreed with treatment plan. Follow up as directed.      Electronically signed by Petra Cramer MD on 10/21/2021 at 2:09 PM

## 2021-12-20 RX ORDER — OMEPRAZOLE 20 MG/1
CAPSULE, DELAYED RELEASE ORAL
Qty: 90 CAPSULE | Refills: 3 | Status: SHIPPED | OUTPATIENT
Start: 2021-12-20

## 2021-12-20 RX ORDER — DICLOFENAC SODIUM 75 MG/1
TABLET, DELAYED RELEASE ORAL
Qty: 180 TABLET | Refills: 3 | Status: SHIPPED | OUTPATIENT
Start: 2021-12-20

## 2022-01-27 ENCOUNTER — OFFICE VISIT (OUTPATIENT)
Dept: PRIMARY CARE CLINIC | Age: 87
End: 2022-01-27
Payer: COMMERCIAL

## 2022-01-27 VITALS
SYSTOLIC BLOOD PRESSURE: 130 MMHG | OXYGEN SATURATION: 95 % | HEART RATE: 82 BPM | HEIGHT: 63 IN | WEIGHT: 170.2 LBS | DIASTOLIC BLOOD PRESSURE: 76 MMHG | BODY MASS INDEX: 30.16 KG/M2

## 2022-01-27 DIAGNOSIS — E55.9 VITAMIN D DEFICIENCY: ICD-10-CM

## 2022-01-27 DIAGNOSIS — R55 VASOVAGAL SYNCOPE: ICD-10-CM

## 2022-01-27 DIAGNOSIS — E78.2 MIXED HYPERLIPIDEMIA: ICD-10-CM

## 2022-01-27 DIAGNOSIS — D64.9 ANEMIA, UNSPECIFIED TYPE: Primary | ICD-10-CM

## 2022-01-27 PROCEDURE — 99213 OFFICE O/P EST LOW 20 MIN: CPT | Performed by: FAMILY MEDICINE

## 2022-01-27 SDOH — ECONOMIC STABILITY: FOOD INSECURITY: WITHIN THE PAST 12 MONTHS, YOU WORRIED THAT YOUR FOOD WOULD RUN OUT BEFORE YOU GOT MONEY TO BUY MORE.: NEVER TRUE

## 2022-01-27 SDOH — ECONOMIC STABILITY: FOOD INSECURITY: WITHIN THE PAST 12 MONTHS, THE FOOD YOU BOUGHT JUST DIDN'T LAST AND YOU DIDN'T HAVE MONEY TO GET MORE.: NEVER TRUE

## 2022-01-27 ASSESSMENT — ENCOUNTER SYMPTOMS
RHINORRHEA: 0
SORE THROAT: 0
WHEEZING: 0
ABDOMINAL PAIN: 0
VOMITING: 0
NAUSEA: 0
EYE REDNESS: 0
EYE DISCHARGE: 0
COUGH: 0
SHORTNESS OF BREATH: 0
DIARRHEA: 0

## 2022-01-27 ASSESSMENT — SOCIAL DETERMINANTS OF HEALTH (SDOH): HOW HARD IS IT FOR YOU TO PAY FOR THE VERY BASICS LIKE FOOD, HOUSING, MEDICAL CARE, AND HEATING?: NOT HARD AT ALL

## 2022-01-27 NOTE — PROGRESS NOTES
717 [facility] PRIMARY CARE  [address] [place] [address]  Dept: Favian Burleson Rosita Lara is a 80 y.o. female Established patient, who presents today for her medical conditions/complaints as noted below. Chief Complaint   Patient presents with    Medication Check       HPI:     HPI- doing okay- has not taken any midodrine for a while. No new issues. Reviewed prior notes None  Reviewed previous Labs    LDL Calculated (mg/dL)   Date Value   06/04/2018 135   11/19/2013 154   11/14/2012 149       (goal LDL is <100)   AST (U/L)   Date Value   11/14/2012 17     ALT (U/L)   Date Value   11/14/2012 15     BUN (mg/dL)   Date Value   11/19/2013 17     Hemoglobin A1C (%)   Date Value   11/19/2013 5.6     BP Readings from Last 3 Encounters:   01/27/22 130/76   10/21/21 138/72   07/21/21 138/70          (goal 120/80)    Past Medical History:   Diagnosis Date    Basal cell carcinoma     mulitple    Hyperlipidemia 8/30/2011    Mild hyperglycemia. 8/30/2011    Orthostatic hypotension 8/30/2011    Osteoarthritis     knees, thumbs    Post-menopausal     Pre-cancerous skin lesions.  8/30/2011    Psoriasis 8/30/2011    Syncope, non cardiac     \"vascular syncope\" per patient      Past Surgical History:   Procedure Laterality Date    APPENDECTOMY      during hyst    COLON SURGERY      fistula repair    COLONOSCOPY  1997, 2002    FOOT SURGERY      hammartoe    HYSTERECTOMY      complete, d/t fibroids    JOINT REPLACEMENT  2001    Right and left knees    SIGMOIDOSCOPY  2016    SKIN CANCER DESTRUCTION      basal cell    SKIN CANCER EXCISION      basal cell       Family History   Problem Relation Age of Onset    Cancer Other         ovarian    Breast Cancer Mother     Cancer Sister         melanoma    Diabetes Sister     Cancer Brother         kidney    Cancer Maternal Aunt         melanoma    Prostate Cancer Maternal Grandfather     Diabetes Sister  Heart Disease Father         angina    Diabetes Maternal Grandmother     Cancer Brother         colon       Social History     Tobacco Use    Smoking status: Never Smoker    Smokeless tobacco: Never Used   Substance Use Topics    Alcohol use: No      Current Outpatient Medications   Medication Sig Dispense Refill    omeprazole (PRILOSEC) 20 MG delayed release capsule TAKE 1 CAPSULE DAILY 90 capsule 3    diclofenac (VOLTAREN) 75 MG EC tablet TAKE 1 TABLET TWICE A  tablet 3    clotrimazole-betamethasone (LOTRISONE) 1-0.05 % cream Apply topically 2 times daily. 45 g 0    midodrine (PROAMATINE) 2.5 MG tablet Take 1 tablet by mouth every 4 hours as needed (near syncope) Indications: Dr Loretta Holstein only wants pt taking 2.5 mg 90 tablet 0    vitamin D (CHOLECALCIFEROL) 1000 UNIT TABS tablet Take 3,000 Units by mouth daily      Docusate Calcium (STOOL SOFTENER PO) Take 1 tablet by mouth daily      niacin 100 MG tablet Take 100 mg by mouth daily (with breakfast)      Calcium Carbonate-Vitamin D (CALCIUM + D) 600-200 MG-UNIT TABS Take  by mouth.  therapeutic multivitamin-minerals (THERAGRAN-M) tablet Take 1 tablet by mouth daily.  aspirin 81 MG EC tablet Take 81 mg by mouth daily.  fluocinolone acetonide (SYNALAR) 0.01 % external solution Apply topically 2 times daily Apply topically 2 times daily. (Patient not taking: Reported on 1/27/2022) 1 Bottle 1    Omega-3 Fatty Acids (FISH OIL) 1000 MG CAPS Take 3,000 mg by mouth 3 times daily  (Patient not taking: Reported on 1/27/2022)       No current facility-administered medications for this visit. Allergies   Allergen Reactions    Adhesive Tape      Tears her skin    Anesthetic [Benzocaine]      Difficulty waking    Benzocaine-Benzethonium      Difficulty waking    Pcn [Penicillins] Rash     Other reaction(s):  Intolerance       Health Maintenance   Topic Date Due    DTaP/Tdap/Td vaccine (1 - Tdap) Never done    Shingles Vaccine (2 of 3) 01/28/2016    Depression Screen  04/16/2022    Flu vaccine  Completed    Pneumococcal 65+ years Vaccine  Completed    COVID-19 Vaccine  Completed    Hepatitis A vaccine  Aged Out    Hepatitis B vaccine  Aged Out    Hib vaccine  Aged Out    Meningococcal (ACWY) vaccine  Aged Out       Subjective:      Review of Systems   Constitutional: Negative for chills and fever. HENT: Negative for rhinorrhea and sore throat. Eyes: Negative for discharge and redness. Respiratory: Negative for cough, shortness of breath and wheezing. Cardiovascular: Negative for chest pain and palpitations. Gastrointestinal: Negative for abdominal pain, diarrhea, nausea and vomiting. Genitourinary: Negative for dysuria and frequency. Musculoskeletal: Negative for arthralgias and myalgias. Neurological: Positive for headaches. Negative for dizziness and light-headedness. Psychiatric/Behavioral: Negative for sleep disturbance. Objective:     /76   Pulse 82   Ht 5' 3\" (1.6 m)   Wt 170 lb 3.2 oz (77.2 kg)   SpO2 95%   BMI 30.15 kg/m²   Physical Exam  Vitals and nursing note reviewed. Constitutional:       General: She is not in acute distress. Appearance: She is well-developed. She is not ill-appearing. HENT:      Head: Normocephalic and atraumatic. Right Ear: External ear normal.      Left Ear: External ear normal.   Eyes:      General: No scleral icterus. Right eye: No discharge. Left eye: No discharge. Conjunctiva/sclera: Conjunctivae normal.      Pupils: Pupils are equal, round, and reactive to light. Neck:      Thyroid: No thyromegaly. Trachea: No tracheal deviation. Cardiovascular:      Rate and Rhythm: Normal rate and regular rhythm. Heart sounds: Normal heart sounds. Pulmonary:      Effort: Pulmonary effort is normal. No respiratory distress. Breath sounds: Normal breath sounds. No wheezing.    Lymphadenopathy:      Cervical: No cervical adenopathy. Skin:     General: Skin is warm. Findings: No rash. Neurological:      Mental Status: She is alert and oriented to person, place, and time. Psychiatric:         Mood and Affect: Mood normal.         Behavior: Behavior normal.         Thought Content: Thought content normal.         Assessment/Plan:   1. Anemia, unspecified type  -     CBC Auto Differential; Future  -     Comprehensive Metabolic Panel; Future  -     TSH without Reflex; Future  -     Vitamin D 25 Hydroxy; Future  2. Mixed hyperlipidemia  -     CBC Auto Differential; Future  -     Comprehensive Metabolic Panel; Future  -     TSH without Reflex; Future  -     Vitamin D 25 Hydroxy; Future  3. Vitamin D deficiency  -     CBC Auto Differential; Future  -     Comprehensive Metabolic Panel; Future  -     TSH without Reflex; Future  -     Vitamin D 25 Hydroxy; Future  4. Vasovagal syncope  Assessment & Plan:   Well-controlled, continue current medications       Return in about 6 months (around 7/27/2022) for HTN f/u, MWV. Orders Placed This Encounter   Procedures    CBC Auto Differential     Standing Status:   Future     Standing Expiration Date:   1/28/2023    Comprehensive Metabolic Panel     Standing Status:   Future     Standing Expiration Date:   1/28/2023    TSH without Reflex     Standing Status:   Future     Standing Expiration Date:   1/28/2023    Vitamin D 25 Hydroxy     Standing Status:   Future     Standing Expiration Date:   1/27/2023     No orders of the defined types were placed in this encounter. Patient given educational materials - see patient instructions. Discussed use, benefit, and side effects of prescribed medications. All patient questions answered. Pt voiced understanding. Reviewed health maintenance. Instructed to continue current medications, diet and exercise. Patient agreed with treatment plan. Follow up as directed.      Electronically signed by Rakel Mariee MD on 1/27/2022 at 1:16 PM

## 2022-02-09 LAB
ALBUMIN SERPL-MCNC: NORMAL G/DL
ALP BLD-CCNC: NORMAL U/L
ALT SERPL-CCNC: NORMAL U/L
ANION GAP SERPL CALCULATED.3IONS-SCNC: NORMAL MMOL/L
AST SERPL-CCNC: NORMAL U/L
BILIRUB SERPL-MCNC: NORMAL MG/DL
BUN BLDV-MCNC: NORMAL MG/DL
CALCIUM SERPL-MCNC: NORMAL MG/DL
CHLORIDE BLD-SCNC: NORMAL MMOL/L
CO2: NORMAL
CREAT SERPL-MCNC: NORMAL MG/DL
GFR CALCULATED: NORMAL
GLUCOSE BLD-MCNC: 94 MG/DL
POTASSIUM SERPL-SCNC: NORMAL MMOL/L
SODIUM BLD-SCNC: NORMAL MMOL/L
TOTAL PROTEIN: NORMAL

## 2022-02-10 DIAGNOSIS — E55.9 VITAMIN D DEFICIENCY: ICD-10-CM

## 2022-02-10 DIAGNOSIS — E78.2 MIXED HYPERLIPIDEMIA: ICD-10-CM

## 2022-02-10 DIAGNOSIS — D64.9 ANEMIA, UNSPECIFIED TYPE: ICD-10-CM

## 2022-02-24 ENCOUNTER — TELEPHONE (OUTPATIENT)
Dept: PRIMARY CARE CLINIC | Age: 87
End: 2022-02-24

## 2022-02-24 DIAGNOSIS — Z12.31 ENCOUNTER FOR SCREENING MAMMOGRAM FOR MALIGNANT NEOPLASM OF BREAST: Primary | ICD-10-CM

## 2022-02-24 NOTE — TELEPHONE ENCOUNTER
Order faxed to [facility], callled patient let her know but her  said she wont be back until later.

## 2022-02-24 NOTE — TELEPHONE ENCOUNTER
----- Message from aMndy Javris sent at 2/24/2022 11:18 AM EST -----  Subject: Referral Request    QUESTIONS   Reason for referral request? Pt is in need of a mammogram referral order   to be sent [facility] for her annual mammogram to be done. Please   contact pt when this is completed and ready for scheduling   Has the physician seen you for this condition before? Yes  Select a date? 2021-03-12  Select the Provider the patient wants to be referred to, if known (PCP or   Specialist)? Outside Physician [facility]   Preferred Specialist (if applicable)? Do you already have an appointment scheduled? No  Additional Information for Provider?   ---------------------------------------------------------------------------  --------------  CALL BACK INFO  What is the best way for the office to contact you? OK to leave message on   voicemail  Preferred Call Back Phone Number?  [phone]

## 2022-03-23 DIAGNOSIS — Z12.31 ENCOUNTER FOR SCREENING MAMMOGRAM FOR MALIGNANT NEOPLASM OF BREAST: ICD-10-CM

## 2022-05-03 ENCOUNTER — OFFICE VISIT (OUTPATIENT)
Dept: PRIMARY CARE CLINIC | Age: 87
End: 2022-05-03
Payer: COMMERCIAL

## 2022-05-03 VITALS
BODY MASS INDEX: 28.91 KG/M2 | OXYGEN SATURATION: 97 % | DIASTOLIC BLOOD PRESSURE: 92 MMHG | WEIGHT: 163.2 LBS | HEART RATE: 80 BPM | SYSTOLIC BLOOD PRESSURE: 172 MMHG

## 2022-05-03 DIAGNOSIS — G45.9 TIA (TRANSIENT ISCHEMIC ATTACK): Primary | ICD-10-CM

## 2022-05-03 DIAGNOSIS — I10 HYPERTENSION, UNSPECIFIED TYPE: ICD-10-CM

## 2022-05-03 PROCEDURE — 99214 OFFICE O/P EST MOD 30 MIN: CPT | Performed by: PHYSICIAN ASSISTANT

## 2022-05-03 RX ORDER — METOPROLOL SUCCINATE 25 MG/1
12.5 TABLET, EXTENDED RELEASE ORAL DAILY
Qty: 15 TABLET | Refills: 0 | Status: SHIPPED | OUTPATIENT
Start: 2022-05-03 | End: 2022-05-16 | Stop reason: SDUPTHER

## 2022-05-03 ASSESSMENT — PATIENT HEALTH QUESTIONNAIRE - PHQ9
SUM OF ALL RESPONSES TO PHQ QUESTIONS 1-9: 2
SUM OF ALL RESPONSES TO PHQ QUESTIONS 1-9: 2
5. POOR APPETITE OR OVEREATING: 0
6. FEELING BAD ABOUT YOURSELF - OR THAT YOU ARE A FAILURE OR HAVE LET YOURSELF OR YOUR FAMILY DOWN: 0
SUM OF ALL RESPONSES TO PHQ9 QUESTIONS 1 & 2: 0
9. THOUGHTS THAT YOU WOULD BE BETTER OFF DEAD, OR OF HURTING YOURSELF: 0
2. FEELING DOWN, DEPRESSED OR HOPELESS: 0
7. TROUBLE CONCENTRATING ON THINGS, SUCH AS READING THE NEWSPAPER OR WATCHING TELEVISION: 1
1. LITTLE INTEREST OR PLEASURE IN DOING THINGS: 0
3. TROUBLE FALLING OR STAYING ASLEEP: 0
SUM OF ALL RESPONSES TO PHQ QUESTIONS 1-9: 2
8. MOVING OR SPEAKING SO SLOWLY THAT OTHER PEOPLE COULD HAVE NOTICED. OR THE OPPOSITE, BEING SO FIGETY OR RESTLESS THAT YOU HAVE BEEN MOVING AROUND A LOT MORE THAN USUAL: 0
SUM OF ALL RESPONSES TO PHQ QUESTIONS 1-9: 2
4. FEELING TIRED OR HAVING LITTLE ENERGY: 1
10. IF YOU CHECKED OFF ANY PROBLEMS, HOW DIFFICULT HAVE THESE PROBLEMS MADE IT FOR YOU TO DO YOUR WORK, TAKE CARE OF THINGS AT HOME, OR GET ALONG WITH OTHER PEOPLE: 1

## 2022-05-03 ASSESSMENT — ENCOUNTER SYMPTOMS
CONSTIPATION: 0
SORE THROAT: 0
ABDOMINAL DISTENTION: 0
SHORTNESS OF BREATH: 0
EYE DISCHARGE: 0
PHOTOPHOBIA: 0
COUGH: 0
SINUS PRESSURE: 0
DIARRHEA: 0
VOMITING: 0
ABDOMINAL PAIN: 0
CHEST TIGHTNESS: 0
RHINORRHEA: 0

## 2022-05-03 NOTE — PROGRESS NOTES
[facility] PRIMARY CARE  [address] [address]  Dept: Favian [address] Judith Salinas is a 80 y.o. female Established patient, who presents today for her medical conditions/complaints as noted below. Chief Complaint   Patient presents with    Headache     elevated blood pressure       HPI:     HPI: The patient is having high blood pressure reading on and off over the last few years. Saturday was hard to get the right word out. That lasted a few min. The patient has been getting 156/70 and 156/68 129/59 122/66. In the morning she does have ringing in ears. No palpitations. BP is elevated here her cuff shows 150/80 and our measurement is higher at 168/90. Reviewed prior notes None  Reviewed previous Labs    LDL Calculated (mg/dL)   Date Value   06/04/2018 135   11/19/2013 154   11/14/2012 149       (goal LDL is <100)   AST (U/L)   Date Value   11/14/2012 17     ALT (U/L)   Date Value   11/14/2012 15     BUN (mg/dL)   Date Value   11/19/2013 17     Hemoglobin A1C (%)   Date Value   11/19/2013 5.6     BP Readings from Last 3 Encounters:   05/03/22 (!) 172/92   01/27/22 130/76   10/21/21 138/72          (goal 120/80)    Past Medical History:   Diagnosis Date    Basal cell carcinoma     mulitple    Hyperlipidemia 8/30/2011    Mild hyperglycemia. 8/30/2011    Orthostatic hypotension 8/30/2011    Osteoarthritis     knees, thumbs    Post-menopausal     Pre-cancerous skin lesions.  8/30/2011    Psoriasis 8/30/2011    Syncope, non cardiac     \"vascular syncope\" per patient      Past Surgical History:   Procedure Laterality Date    APPENDECTOMY      during hyst    COLON SURGERY      fistula repair    COLONOSCOPY  1997, 2002    FOOT SURGERY      hammartoe    HYSTERECTOMY      complete, d/t fibroids    JOINT REPLACEMENT  2001    Right and left knees    SIGMOIDOSCOPY  2016    SKIN CANCER DESTRUCTION      basal cell    SKIN CANCER EXCISION basal cell       Family History   Problem Relation Age of Onset    Cancer Other         ovarian    Breast Cancer Mother     Cancer Sister         melanoma    Diabetes Sister     Cancer Brother         kidney    Cancer Maternal Aunt         melanoma    Prostate Cancer Maternal Grandfather     Diabetes Sister     Heart Disease Father         angina    Diabetes Maternal Grandmother     Cancer Brother         colon       Social History     Tobacco Use    Smoking status: Never Smoker    Smokeless tobacco: Never Used   Substance Use Topics    Alcohol use: No      Current Outpatient Medications   Medication Sig Dispense Refill    metoprolol succinate (TOPROL XL) 25 MG extended release tablet Take 0.5 tablets by mouth daily 15 tablet 0    omeprazole (PRILOSEC) 20 MG delayed release capsule TAKE 1 CAPSULE DAILY 90 capsule 3    diclofenac (VOLTAREN) 75 MG EC tablet TAKE 1 TABLET TWICE A  tablet 3    clotrimazole-betamethasone (LOTRISONE) 1-0.05 % cream Apply topically 2 times daily. 45 g 0    fluocinolone acetonide (SYNALAR) 0.01 % external solution Apply topically 2 times daily Apply topically 2 times daily. 1 Bottle 1    vitamin D (CHOLECALCIFEROL) 1000 UNIT TABS tablet Take 3,000 Units by mouth daily      Docusate Calcium (STOOL SOFTENER PO) Take 1 tablet by mouth daily      niacin 100 MG tablet Take 100 mg by mouth daily (with breakfast)      Calcium Carbonate-Vitamin D (CALCIUM + D) 600-200 MG-UNIT TABS Take  by mouth.  therapeutic multivitamin-minerals (THERAGRAN-M) tablet Take 1 tablet by mouth daily.  aspirin 81 MG EC tablet Take 81 mg by mouth daily.  midodrine (PROAMATINE) 2.5 MG tablet Take 1 tablet by mouth every 4 hours as needed (near syncope) Indications: Dr Oren Gallegos only wants pt taking 2.5 mg (Patient not taking: Reported on 5/3/2022) 90 tablet 0     No current facility-administered medications for this visit.      Allergies   Allergen Reactions    Adhesive Tape      Tears her skin    Anesthetic [Benzocaine]      Difficulty waking    Benzocaine-Benzethonium      Difficulty waking    Pcn [Penicillins] Rash     Other reaction(s): Intolerance       Health Maintenance   Topic Date Due    DTaP/Tdap/Td vaccine (1 - Tdap) Never done    Shingles vaccine (2 of 3) 01/28/2016    Depression Screen  04/16/2022    Flu vaccine  Completed    Pneumococcal 65+ years Vaccine  Completed    COVID-19 Vaccine  Completed    Hepatitis A vaccine  Aged Out    Hepatitis B vaccine  Aged Out    Hib vaccine  Aged Out    Meningococcal (ACWY) vaccine  Aged Out       Subjective:      Review of Systems   Constitutional: Negative for chills, fever and unexpected weight change. HENT: Negative for congestion, hearing loss, rhinorrhea, sinus pressure and sore throat. Eyes: Negative for photophobia, discharge and visual disturbance. Respiratory: Negative for cough, chest tightness and shortness of breath. Cardiovascular: Negative for chest pain, palpitations and leg swelling. Gastrointestinal: Negative for abdominal distention, abdominal pain, constipation, diarrhea and vomiting. Endocrine: Negative for polydipsia and polyuria. Genitourinary: Negative for decreased urine volume, difficulty urinating, frequency and urgency. Musculoskeletal: Negative for arthralgias, gait problem and myalgias. Skin: Negative for rash. Allergic/Immunologic: Negative for food allergies. Neurological: Negative for dizziness, weakness, numbness and headaches. Hematological: Negative for adenopathy. Psychiatric/Behavioral: Negative for dysphoric mood and sleep disturbance. The patient is not nervous/anxious. Objective:     BP (!) 172/92   Pulse 80   Wt 163 lb 3.2 oz (74 kg)   SpO2 97%   BMI 28.91 kg/m²   Physical Exam  Constitutional:       General: She is not in acute distress. Appearance: Normal appearance. She is not ill-appearing.    HENT:      Head: Normocephalic and atraumatic. Right Ear: External ear normal.      Left Ear: External ear normal.      Nose: Nose normal.      Mouth/Throat:      Mouth: Mucous membranes are moist.   Eyes:      Extraocular Movements: Extraocular movements intact. Conjunctiva/sclera: Conjunctivae normal.      Pupils: Pupils are equal, round, and reactive to light. Neck:      Vascular: No carotid bruit. Cardiovascular:      Rate and Rhythm: Normal rate and regular rhythm. Pulses: Normal pulses. Heart sounds: Normal heart sounds. Pulmonary:      Effort: Pulmonary effort is normal. No respiratory distress. Breath sounds: Normal breath sounds. Abdominal:      General: Bowel sounds are normal. There is no distension. Tenderness: There is no abdominal tenderness. Musculoskeletal:         General: Normal range of motion. Cervical back: Normal range of motion and neck supple. Lymphadenopathy:      Cervical: No cervical adenopathy. Skin:     General: Skin is warm and dry. Neurological:      General: No focal deficit present. Mental Status: She is alert and oriented to person, place, and time. Psychiatric:         Mood and Affect: Mood normal.         Behavior: Behavior normal.         Thought Content: Thought content normal.         Assessment and Plan:          1. TIA (transient ischemic attack)  -     metoprolol succinate (TOPROL XL) 25 MG extended release tablet; Take 0.5 tablets by mouth daily, Disp-15 tablet, R-0Normal  2. Hypertension, unspecified type  -     metoprolol succinate (TOPROL XL) 25 MG extended release tablet; Take 0.5 tablets by mouth daily, Disp-15 tablet, R-0Normal   Recheck BP in two weeks at nurse visit. Has had this issues for years. It has not changed. Has some elevated readings and some low readings. Because of low readings patient educated on risk of starting medication and educated to stop the medication if it causes any low BP readings.  Will get patient started on metoprolol 12.5 mg due to low readings I do not want to start anything stronger at this time. Patient educated to keep log of BP twice daily and with any abnormal feelings. Did have an episode of syncope as well. If any trouble finding words or passing out or stroke symptoms call 911. zarianet educated on possibility of increased stoke risk since she could of had TIA and educated stroke symptoms to look for. Patient given educational materials - see patient instructions. Discussed use, benefit, and side effects of prescribed medications. All patient questions answered. Pt voiced understanding. Reviewed health maintenance. Instructed to continue current medications, diet and exercise. Patient agreed with treatment plan. Follow up as directed.      Electronically signed by ART Gracia on 5/3/2022 at 8:13 AM

## 2022-05-16 ENCOUNTER — NURSE ONLY (OUTPATIENT)
Dept: PRIMARY CARE CLINIC | Age: 87
End: 2022-05-16
Payer: COMMERCIAL

## 2022-05-16 VITALS
DIASTOLIC BLOOD PRESSURE: 60 MMHG | SYSTOLIC BLOOD PRESSURE: 124 MMHG | HEART RATE: 83 BPM | OXYGEN SATURATION: 96 % | WEIGHT: 163.6 LBS | BODY MASS INDEX: 28.98 KG/M2

## 2022-05-16 DIAGNOSIS — I10 HYPERTENSION, UNSPECIFIED TYPE: ICD-10-CM

## 2022-05-16 DIAGNOSIS — G45.9 TIA (TRANSIENT ISCHEMIC ATTACK): ICD-10-CM

## 2022-05-16 DIAGNOSIS — Z01.30 BLOOD PRESSURE CHECK: Primary | ICD-10-CM

## 2022-05-16 PROCEDURE — 99211 OFF/OP EST MAY X REQ PHY/QHP: CPT | Performed by: FAMILY MEDICINE

## 2022-05-16 RX ORDER — METOPROLOL SUCCINATE 25 MG/1
12.5 TABLET, EXTENDED RELEASE ORAL DAILY
Qty: 15 TABLET | Refills: 5 | Status: SHIPPED | OUTPATIENT
Start: 2022-05-16

## 2022-05-16 NOTE — PROGRESS NOTES
Pt presents for BP check. Blood pressure today was 124/60. Pt meds reviewed and marked accordingly. Per Dr. Legrand Sicard BP is in good range including at home readings. Continue same meds. Pt verbalized understanding.

## 2022-07-28 ENCOUNTER — OFFICE VISIT (OUTPATIENT)
Dept: PRIMARY CARE CLINIC | Age: 87
End: 2022-07-28
Payer: COMMERCIAL

## 2022-07-28 VITALS
DIASTOLIC BLOOD PRESSURE: 60 MMHG | OXYGEN SATURATION: 95 % | BODY MASS INDEX: 28.95 KG/M2 | HEIGHT: 63 IN | HEART RATE: 75 BPM | SYSTOLIC BLOOD PRESSURE: 120 MMHG | WEIGHT: 163.4 LBS

## 2022-07-28 DIAGNOSIS — I95.1 ORTHOSTATIC HYPOTENSION: ICD-10-CM

## 2022-07-28 DIAGNOSIS — Z00.00 MEDICARE ANNUAL WELLNESS VISIT, SUBSEQUENT: Primary | ICD-10-CM

## 2022-07-28 PROCEDURE — 1123F ACP DISCUSS/DSCN MKR DOCD: CPT | Performed by: FAMILY MEDICINE

## 2022-07-28 PROCEDURE — G0439 PPPS, SUBSEQ VISIT: HCPCS | Performed by: FAMILY MEDICINE

## 2022-07-28 ASSESSMENT — PATIENT HEALTH QUESTIONNAIRE - PHQ9
SUM OF ALL RESPONSES TO PHQ9 QUESTIONS 1 & 2: 0
SUM OF ALL RESPONSES TO PHQ QUESTIONS 1-9: 0
SUM OF ALL RESPONSES TO PHQ QUESTIONS 1-9: 0
2. FEELING DOWN, DEPRESSED OR HOPELESS: 0
1. LITTLE INTEREST OR PLEASURE IN DOING THINGS: 0
SUM OF ALL RESPONSES TO PHQ QUESTIONS 1-9: 0
SUM OF ALL RESPONSES TO PHQ QUESTIONS 1-9: 0

## 2022-07-28 ASSESSMENT — LIFESTYLE VARIABLES: HOW OFTEN DO YOU HAVE A DRINK CONTAINING ALCOHOL: NEVER

## 2022-07-28 NOTE — PROGRESS NOTES
Medicare Annual Wellness Visit    Cheryl Rodriguez is here for Hypertension (Patient checks B/P at home ) and Medicare AWV (Patient was given the words respect elephant and green to remember. She was given the time of 7:15 to draw )    Assessment & Plan   Medicare annual wellness visit, subsequent  Orthostatic hypotension  Assessment & Plan:   stable- cotninue same    Recommendations for Preventive Services Due: see orders and patient instructions/AVS.  Recommended screening schedule for the next 5-10 years is provided to the patient in written form: see Patient Instructions/AVS.     Return in about 6 months (around 1/28/2023) for HTN f/u. Subjective   The following acute and/or chronic problems were also addressed today:  Dealing with a lot of stress with  being gone. Doing okay around the house though. Patient's complete Health Risk Assessment and screening values have been reviewed and are found in Flowsheets. The following problems were reviewed today and where indicated follow up appointments were made and/or referrals ordered.     Positive Risk Factor Screenings with Interventions:    Fall Risk:  Do you feel unsteady or are you worried about falling? : (!) yes  2 or more falls in past year?: no  Fall with injury in past year?: no   Fall Risk Interventions:    Home safety tips provided            General Health and ACP:  General  In general, how would you say your health is?: Very Good  In the past 7 days, have you experienced any of the following: New or Increased Pain, New or Increased Fatigue, Loneliness, Social Isolation, Stress or Anger?: (!) Yes  Select all that apply: (!) New or Increased Pain  Do you get the social and emotional support that you need?: Yes  Do you have a Living Will?: Yes    Advance Directives       Power of  Living Will ACP-Advance Directive ACP-Power of     Not on File Not on File Not on File Not on [facility] Interventions:  Having pain in shoulder and hip. Harder to go up stairs. Safety:  Do you have working smoke detectors?: (!) No  Do you have any tripping hazards - loose or unsecured carpets or rugs?: No  Do you have any tripping hazards - clutter in doorways, halls, or stairs?: No  Do you have either shower bars, grab bars, non-slip mats or non-slip surfaces in your shower or bathtub?: (!) No  Do all of your stairways have a railing or banister?: Yes  Do you always fasten your seatbelt when you are in a car?: Yes  Safety Interventions:  Home safety tips provided           Objective   Vitals:    07/28/22 1312   BP: 120/60   Pulse: 75   SpO2: 95%   Weight: 163 lb 6.4 oz (74.1 kg)   Height: 5' 3\" (1.6 m)      Body mass index is 28.95 kg/m². Physical Exam  Vitals and nursing note reviewed. Constitutional:       General: She is not in acute distress. Appearance: She is well-developed. She is not ill-appearing. HENT:      Head: Normocephalic and atraumatic. Right Ear: External ear normal.      Left Ear: External ear normal.   Eyes:      General: No scleral icterus. Right eye: No discharge. Left eye: No discharge. Conjunctiva/sclera: Conjunctivae normal.   Neck:      Thyroid: No thyromegaly. Trachea: No tracheal deviation. Cardiovascular:      Rate and Rhythm: Normal rate and regular rhythm. Heart sounds: Normal heart sounds. Pulmonary:      Effort: Pulmonary effort is normal. No respiratory distress. Breath sounds: Normal breath sounds. No wheezing. Lymphadenopathy:      Cervical: No cervical adenopathy. Skin:     General: Skin is warm. Findings: No rash. Neurological:      Mental Status: She is alert and oriented to person, place, and time. Psychiatric:         Mood and Affect: Mood normal.         Behavior: Behavior normal.         Thought Content:  Thought content normal.           Allergies   Allergen Reactions    Adhesive Tape Other (See Comments)     Tears her skin    Anesthetic [Benzocaine]      Difficulty waking    Benzocaine-Benzethonium      Difficulty waking    Pcn [Penicillins] Rash     Other reaction(s): Intolerance     Prior to Visit Medications    Medication Sig Taking? Authorizing Provider   metoprolol succinate (TOPROL XL) 25 MG extended release tablet Take 0.5 tablets by mouth daily Yes Kenia Boyer MD   omeprazole (PRILOSEC) 20 MG delayed release capsule TAKE 1 CAPSULE DAILY Yes Kenia Boyer MD   diclofenac (VOLTAREN) 75 MG EC tablet TAKE 1 TABLET TWICE A DAY  Patient taking differently: every other day Takes one tablet every other day. Yes Kenia Boyer MD   clotrimazole-betamethasone (LOTRISONE) 1-0.05 % cream Apply topically 2 times daily. Yes Kenia Boyer MD   vitamin D (CHOLECALCIFEROL) 1000 UNIT TABS tablet Take 3,000 Units by mouth daily Yes Historical Provider, MD   Docusate Calcium (STOOL SOFTENER PO) Take 1 tablet by mouth daily Yes Historical Provider, MD   niacin 100 MG tablet Take 100 mg by mouth daily (with breakfast) Yes Historical Provider, MD   therapeutic multivitamin-minerals (THERAGRAN-M) tablet Take 1 tablet by mouth daily. Yes Historical Provider, MD   aspirin 81 MG EC tablet Take 81 mg by mouth every other day Taking every other day d/t bruising. Yes Historical Provider, MD   fluocinolone acetonide (SYNALAR) 0.01 % external solution Apply topically 2 times daily Apply topically 2 times daily. Patient not taking: No sig reported  Kenia Boyer MD   midodrine (PROAMATINE) 2.5 MG tablet Take 1 tablet by mouth every 4 hours as needed (near syncope) Indications: Dr Linda Morris only wants pt taking 2.5 mg  Patient not taking: No sig reported  Bravo Mcnally PA-C   calcium carbonate-vitamin D 600-200 MG-UNIT TABS Take  by mouth.     Patient not taking: No sig reported  Historical Provider, MD Krueger (Including outside providers/suppliers regularly involved in providing care):   Patient Care Team:  King Mccallum MD as PCP - General (Family Medicine)  King Mccallum MD as PCP - [facility] Empaneled Provider  Mary Emery as Consulting Physician (Dermatology)  Charles López MD as Consulting Physician (Gastroenterology)  Mary Emery as Consulting Physician (Dermatology)  Charles López MD as Consulting Physician (Gastroenterology)  King Mccallum MD (Family Medicine)  Unknown Provider Result (Inactive)  Unknown Provider Result (Inactive)     Reviewed and updated this visit:  Tobacco  Allergies  Meds  Med Hx  Surg Hx  Soc Hx  Fam Hx

## 2022-12-14 RX ORDER — OMEPRAZOLE 20 MG/1
CAPSULE, DELAYED RELEASE ORAL
Qty: 90 CAPSULE | Refills: 3 | Status: SHIPPED | OUTPATIENT
Start: 2022-12-14

## 2022-12-14 RX ORDER — DICLOFENAC SODIUM 75 MG/1
TABLET, DELAYED RELEASE ORAL
Qty: 180 TABLET | Refills: 3 | Status: SHIPPED | OUTPATIENT
Start: 2022-12-14

## 2023-03-29 DIAGNOSIS — Z12.31 ENCOUNTER FOR SCREENING MAMMOGRAM FOR BREAST CANCER: ICD-10-CM

## 2023-04-07 ENCOUNTER — OFFICE VISIT (OUTPATIENT)
Dept: PRIMARY CARE CLINIC | Age: 88
End: 2023-04-07
Payer: MEDICARE

## 2023-04-07 VITALS
SYSTOLIC BLOOD PRESSURE: 142 MMHG | HEART RATE: 77 BPM | BODY MASS INDEX: 29.48 KG/M2 | WEIGHT: 166.4 LBS | OXYGEN SATURATION: 99 % | DIASTOLIC BLOOD PRESSURE: 70 MMHG

## 2023-04-07 DIAGNOSIS — R03.0 ELEVATED BLOOD PRESSURE READING: ICD-10-CM

## 2023-04-07 DIAGNOSIS — K59.03 DRUG-INDUCED CONSTIPATION: ICD-10-CM

## 2023-04-07 DIAGNOSIS — I95.1 ORTHOSTATIC HYPOTENSION: ICD-10-CM

## 2023-04-07 PROCEDURE — 99213 OFFICE O/P EST LOW 20 MIN: CPT | Performed by: FAMILY MEDICINE

## 2023-04-07 PROCEDURE — 1123F ACP DISCUSS/DSCN MKR DOCD: CPT | Performed by: FAMILY MEDICINE

## 2023-04-07 SDOH — ECONOMIC STABILITY: FOOD INSECURITY: WITHIN THE PAST 12 MONTHS, YOU WORRIED THAT YOUR FOOD WOULD RUN OUT BEFORE YOU GOT MONEY TO BUY MORE.: NEVER TRUE

## 2023-04-07 SDOH — ECONOMIC STABILITY: HOUSING INSECURITY
IN THE LAST 12 MONTHS, WAS THERE A TIME WHEN YOU DID NOT HAVE A STEADY PLACE TO SLEEP OR SLEPT IN A SHELTER (INCLUDING NOW)?: NO

## 2023-04-07 SDOH — ECONOMIC STABILITY: FOOD INSECURITY: WITHIN THE PAST 12 MONTHS, THE FOOD YOU BOUGHT JUST DIDN'T LAST AND YOU DIDN'T HAVE MONEY TO GET MORE.: NEVER TRUE

## 2023-04-07 SDOH — ECONOMIC STABILITY: INCOME INSECURITY: HOW HARD IS IT FOR YOU TO PAY FOR THE VERY BASICS LIKE FOOD, HOUSING, MEDICAL CARE, AND HEATING?: NOT HARD AT ALL

## 2023-04-07 ASSESSMENT — ENCOUNTER SYMPTOMS
COUGH: 0
SHORTNESS OF BREATH: 0
NAUSEA: 0
SORE THROAT: 0
DIARRHEA: 0
EYE REDNESS: 0
EYE DISCHARGE: 0
RHINORRHEA: 0
VOMITING: 0
WHEEZING: 0
ABDOMINAL PAIN: 0

## 2023-04-07 ASSESSMENT — PATIENT HEALTH QUESTIONNAIRE - PHQ9
SUM OF ALL RESPONSES TO PHQ QUESTIONS 1-9: 0
1. LITTLE INTEREST OR PLEASURE IN DOING THINGS: 0
SUM OF ALL RESPONSES TO PHQ QUESTIONS 1-9: 0
SUM OF ALL RESPONSES TO PHQ9 QUESTIONS 1 & 2: 0
2. FEELING DOWN, DEPRESSED OR HOPELESS: 0

## 2023-04-07 NOTE — PROGRESS NOTES
Neck:      Thyroid: No thyromegaly. Trachea: No tracheal deviation. Cardiovascular:      Rate and Rhythm: Normal rate and regular rhythm. Heart sounds: Normal heart sounds. Pulmonary:      Effort: Pulmonary effort is normal. No respiratory distress. Breath sounds: Normal breath sounds. No wheezing. Lymphadenopathy:      Cervical: No cervical adenopathy. Skin:     General: Skin is warm. Findings: No rash. Neurological:      Mental Status: She is alert and oriented to person, place, and time. Psychiatric:         Mood and Affect: Mood normal.         Behavior: Behavior normal.         Thought Content: Thought content normal.       Assessment/Plan:   1. Elevated blood pressure reading  Assessment & Plan:   Well-controlled, continue current medications  2. Drug-induced constipation  Comments:  could try probiotic if needed, continue stool softener prn  3. Orthostatic hypotension  Assessment & Plan:   has not needed midodrine lately       Return in about 3 months (around 7/7/2023). Data Unavailable     No orders of the defined types were placed in this encounter. No orders of the defined types were placed in this encounter. Patient given educational materials - see patient instructions. Discussed use, benefit, and side effects of prescribed medications. All patient questions answered. Pt voiced understanding. Reviewed health maintenance. Instructed to continue current medications, diet and exercise. Patient agreed with treatment plan. Follow up as directed.      Electronically signed by Shyanne Santa MD on 4/7/2023 at 1:31 PM

## 2023-08-14 ENCOUNTER — OFFICE VISIT (OUTPATIENT)
Dept: PRIMARY CARE CLINIC | Age: 88
End: 2023-08-14
Payer: MEDICARE

## 2023-08-14 VITALS
WEIGHT: 165.6 LBS | HEART RATE: 82 BPM | DIASTOLIC BLOOD PRESSURE: 76 MMHG | BODY MASS INDEX: 29.34 KG/M2 | OXYGEN SATURATION: 96 % | SYSTOLIC BLOOD PRESSURE: 130 MMHG | HEIGHT: 63 IN

## 2023-08-14 DIAGNOSIS — D64.9 ANEMIA, UNSPECIFIED TYPE: ICD-10-CM

## 2023-08-14 DIAGNOSIS — R26.89 BALANCE PROBLEM: ICD-10-CM

## 2023-08-14 DIAGNOSIS — R53.83 OTHER FATIGUE: ICD-10-CM

## 2023-08-14 DIAGNOSIS — Z00.00 MEDICARE ANNUAL WELLNESS VISIT, SUBSEQUENT: Primary | ICD-10-CM

## 2023-08-14 DIAGNOSIS — E55.9 VITAMIN D DEFICIENCY: ICD-10-CM

## 2023-08-14 PROCEDURE — G0439 PPPS, SUBSEQ VISIT: HCPCS | Performed by: FAMILY MEDICINE

## 2023-08-14 PROCEDURE — 1123F ACP DISCUSS/DSCN MKR DOCD: CPT | Performed by: FAMILY MEDICINE

## 2023-08-14 ASSESSMENT — PATIENT HEALTH QUESTIONNAIRE - PHQ9
SUM OF ALL RESPONSES TO PHQ QUESTIONS 1-9: 0
SUM OF ALL RESPONSES TO PHQ9 QUESTIONS 1 & 2: 0
1. LITTLE INTEREST OR PLEASURE IN DOING THINGS: 0
SUM OF ALL RESPONSES TO PHQ QUESTIONS 1-9: 0
2. FEELING DOWN, DEPRESSED OR HOPELESS: 0
SUM OF ALL RESPONSES TO PHQ QUESTIONS 1-9: 0
SUM OF ALL RESPONSES TO PHQ QUESTIONS 1-9: 0

## 2023-08-14 ASSESSMENT — LIFESTYLE VARIABLES
HOW OFTEN DO YOU HAVE A DRINK CONTAINING ALCOHOL: NEVER
HOW MANY STANDARD DRINKS CONTAINING ALCOHOL DO YOU HAVE ON A TYPICAL DAY: PATIENT DOES NOT DRINK

## 2023-08-14 NOTE — PROGRESS NOTES
Medicare Annual Wellness Visit    Ruperto Kay is here for Medicare AWV (Time given 11:15 3 words- elephant, green, and respect)    Assessment & Plan   Medicare annual wellness visit, subsequent  Balance problem  -     External Referral To Physical Therapy  Anemia, unspecified type  -     CBC with Auto Differential; Future  -     Comprehensive Metabolic Panel; Future  -     Magnesium; Future  -     TSH; Future  -     Vitamin B12 & Folate; Future  -     Vitamin D 25 Hydroxy; Future  -     Iron; Future  -     Ferritin; Future  Vitamin D deficiency  -     CBC with Auto Differential; Future  -     Comprehensive Metabolic Panel; Future  -     Magnesium; Future  -     TSH; Future  -     Vitamin B12 & Folate; Future  -     Vitamin D 25 Hydroxy; Future  -     Iron; Future  -     Ferritin; Future  Other fatigue  -     CBC with Auto Differential; Future  -     Comprehensive Metabolic Panel; Future  -     Magnesium; Future  -     TSH; Future  -     Vitamin B12 & Folate; Future  -     Vitamin D 25 Hydroxy; Future  -     Iron; Future  -     Ferritin; Future    Recommendations for Preventive Services Due: see orders and patient instructions/AVS.  Recommended screening schedule for the next 5-10 years is provided to the patient in written form: see Patient Instructions/AVS.     No follow-ups on file. Subjective   The following acute and/or chronic problems were also addressed today:  Fartun- seeing foot doctor regularly. Fatigue/ tired easily- started exercising more. Patient's complete Health Risk Assessment and screening values have been reviewed and are found in Flowsheets. The following problems were reviewed today and where indicated follow up appointments were made and/or referrals ordered.     Positive Risk Factor Screenings with Interventions:    Fall Risk:  Do you feel unsteady or are you worried about falling? : (!) yes  2 or more falls in past year?: no  Fall with injury in past year?: no

## 2023-08-14 NOTE — PATIENT INSTRUCTIONS
Personalized Preventive Plan for Kimberly Hernández - 8/14/2023  Medicare offers a range of preventive health benefits. Some of the tests and screenings are paid in full while other may be subject to a deductible, co-insurance, and/or copay. Some of these benefits include a comprehensive review of your medical history including lifestyle, illnesses that may run in your family, and various assessments and screenings as appropriate. After reviewing your medical record and screening and assessments performed today your provider may have ordered immunizations, labs, imaging, and/or referrals for you. A list of these orders (if applicable) as well as your Preventive Care list are included within your After Visit Summary for your review. Other Preventive Recommendations:    A preventive eye exam performed by an eye specialist is recommended every 1-2 years to screen for glaucoma; cataracts, macular degeneration, and other eye disorders. A preventive dental visit is recommended every 6 months. Try to get at least 150 minutes of exercise per week or 10,000 steps per day on a pedometer . Order or download the FREE \"Exercise & Physical Activity: Your Everyday Guide\" from [employer]. Call 1-531.642.4079 or search [employer] online. You need 9312-8794 mg of calcium and 7740-2487 IU of vitamin D per day. It is possible to meet your calcium requirement with diet alone, but a vitamin D supplement is usually necessary to meet this goal.  When exposed to the sun, use a sunscreen that protects against both UVA and UVB radiation with an SPF of 30 or greater. Reapply every 2 to 3 hours or after sweating, drying off with a towel, or swimming. Always wear a seat belt when traveling in a car. Always wear a helmet when riding a bicycle or motorcycle.

## 2023-08-21 LAB
ALBUMIN SERPL-MCNC: NORMAL G/DL
ALP BLD-CCNC: NORMAL U/L
ALT SERPL-CCNC: NORMAL U/L
ANION GAP SERPL CALCULATED.3IONS-SCNC: NORMAL MMOL/L
AST SERPL-CCNC: NORMAL U/L
BASOPHILS ABSOLUTE: NORMAL
BASOPHILS RELATIVE PERCENT: NORMAL
BILIRUB SERPL-MCNC: NORMAL MG/DL
BUN BLDV-MCNC: NORMAL MG/DL
CALCIUM SERPL-MCNC: NORMAL MG/DL
CHLORIDE BLD-SCNC: NORMAL MMOL/L
CO2: NORMAL
CREAT SERPL-MCNC: NORMAL MG/DL
EGFR: NORMAL
EOSINOPHILS ABSOLUTE: NORMAL
EOSINOPHILS RELATIVE PERCENT: NORMAL
FOLATE: NORMAL
GLUCOSE BLD-MCNC: 100 MG/DL
HCT VFR BLD CALC: NORMAL %
HEMOGLOBIN: NORMAL
IRON: NORMAL
LYMPHOCYTES ABSOLUTE: NORMAL
LYMPHOCYTES RELATIVE PERCENT: NORMAL
MAGNESIUM: NORMAL
MCH RBC QN AUTO: NORMAL PG
MCHC RBC AUTO-ENTMCNC: NORMAL G/DL
MCV RBC AUTO: NORMAL FL
MONOCYTES ABSOLUTE: NORMAL
MONOCYTES RELATIVE PERCENT: NORMAL
NEUTROPHILS ABSOLUTE: NORMAL
NEUTROPHILS RELATIVE PERCENT: NORMAL
PDW BLD-RTO: NORMAL %
PLATELET # BLD: NORMAL 10*3/UL
PMV BLD AUTO: NORMAL FL
POTASSIUM SERPL-SCNC: NORMAL MMOL/L
RBC # BLD: NORMAL 10*6/UL
SODIUM BLD-SCNC: NORMAL MMOL/L
TOTAL PROTEIN: NORMAL
TSH SERPL DL<=0.05 MIU/L-ACNC: NORMAL M[IU]/L
VITAMIN B-12: NORMAL
VITAMIN D 25-HYDROXY: NORMAL
VITAMIN D2, 25 HYDROXY: NORMAL
VITAMIN D3,25 HYDROXY: NORMAL
WBC # BLD: NORMAL 10*3/UL

## 2023-08-22 DIAGNOSIS — D64.9 ANEMIA, UNSPECIFIED TYPE: ICD-10-CM

## 2023-08-22 DIAGNOSIS — R53.83 OTHER FATIGUE: ICD-10-CM

## 2023-08-22 DIAGNOSIS — E55.9 VITAMIN D DEFICIENCY: ICD-10-CM

## 2023-09-14 ENCOUNTER — HOSPITAL ENCOUNTER (OUTPATIENT)
Dept: PHYSICAL THERAPY | Age: 88
Setting detail: THERAPIES SERIES
Discharge: HOME | End: 2023-09-14
Payer: MEDICARE

## 2023-09-14 PROCEDURE — 97112 NEUROMUSCULAR REEDUCATION: CPT

## 2023-09-14 PROCEDURE — 97161 PT EVAL LOW COMPLEX 20 MIN: CPT

## 2023-09-14 NOTE — THERAPY EVALUATION
[x] [facility]) - [facility] & Therapy  [address]  [place]: [phone]   F: [phone]        Physical Therapy Neurological Evaluation    Date:  2023  Patient: Ulysses Helm  : 1935  MRN: [id number]  Physician: Rianna Candelario MD   Insurance: [id number] ($08 copay)   Medical Diagnosis: R26.89 (ICD-10-CM) - Balance problem   Rehab Codes: R26.81 unsteadiness on feet   Date of symptom onset: decline over the last 6 month   Next 's appt.: TBD     Precautions: hx of  B TKA     Subjective:   Pt arrives to clinic ambulatory without AD. Pt is agreeable to PT evaluation     Pt notes she is here as recommended by PCP due to her unsteadiness & decreased balance confidence. She notes 1 fall in July where she caught her leg on a bench but denies any injury. She does not use any AD. She sometimes feels unsteady with some stumbles when walking at home --Has to touch her walls. Pt notes over the last couple years since her  passing she has not been as active. Still participates with her [employer] and does aquatics 4x/wk. Just doesn't feel comfortable with walking over uneven surfaces in the community. She notes she is dealing with some pain in the R posterior thigh that requires her to go up her step with her LLE. She would like to address this. 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 Makes it difficulty to balance on these legs.      Patient stated Goals: to improve balance     Pain:  [x] Yes  [] No   Location: R hamstring   Pain Rating: (0-10 scale) 0/10 --described more as tightness  Pain altered Tx:  [] Yes  [x] No  Action: gave HS stretch for HEP     Previous PT hx: none        PMHx: [] Unremarkable [] Diabetes [] HTN  [] Pacemaker   [] MI/Heart Problems [] Cancer [x] Arthritis [] Other:              [x] Refer to full medical chart  In EPIC   Past Medical History:   Diagnosis Date    Basal cell carcinoma

## 2023-09-21 ENCOUNTER — HOSPITAL ENCOUNTER (OUTPATIENT)
Dept: PHYSICAL THERAPY | Age: 88
Setting detail: THERAPIES SERIES
Discharge: HOME | End: 2023-09-21
Payer: MEDICARE

## 2023-09-21 PROCEDURE — 97112 NEUROMUSCULAR REEDUCATION: CPT

## 2023-09-21 NOTE — FLOWSHEET NOTE
[address] [facility]   [address]   Phone: [phone]   Fax: [phone]    Physical Therapy Daily Treatment Note      Date:  2023  Patient Name:  Carmen Darden    :  1935  MRN: [id number]  Physician: Leandra Randolph MD                                    Insurance: [id number] 165 ($53 copay)   Medical Diagnosis: R26.89 (ICD-10-CM) - Balance problem     Rehab Codes: R26.81 unsteadiness on feet   Date of symptom onset: decline over the last 6 month   Next 's appt.: TBD   Visit# / total visits: 2/6  Cancels/No Shows: 0/0    Precautions: hx of  B TKA      Subjective:  Pt notes she has been moderately doing her HEP. She went to chiropractor and feels HS is better.         Pain:  [] Yes  [x] No Location:  N/A Pain Rating: (0-10 scale) denies/10  Pain altered Tx:  [] No  [] Yes  Action:  Comments:    Objective:  INTERVENTIONS  Reps/ Time Weight/ Level Completed  Today Comments   Static balance            Fw/bkwd wt shifts -- EC  20x   x For limited of stability training with light UE support    Tandem stance  2x30s ea   x Light UE to no UE support    Alternating toe taps  20x2 6 in x No UE support    Rhomberg head turns: H & V  2x30s ea  x No UE support, mild sway    Narrow JUANA, EC on foam 2x30s  x Intermittent touchdown UE support    ABCs on Foam 1x 4# ball x                Balance transitions             step ups to foam fwd  10xea    x  no UE support    Step up to foam lateral  10x ea  x No UE support                Dual tasking             walking with ball toss   4x20ft    x     Walking ball bounce  4x20ft  x    Walking with twist holding ball  4x20ft  x                       Reactive stepping            Cross over stepping in front 2x20 ft ea  x    Cross over stepping behind 2x30 ft ea   x                Stretching            Seated HS stretch  2x30s ea                       Patient Education/Home program:   : Access Code:

## 2023-09-25 ENCOUNTER — HOSPITAL ENCOUNTER (OUTPATIENT)
Dept: PHYSICAL THERAPY | Age: 88
Setting detail: THERAPIES SERIES
Discharge: HOME | End: 2023-09-25
Payer: MEDICARE

## 2023-09-25 PROCEDURE — 97112 NEUROMUSCULAR REEDUCATION: CPT

## 2023-09-25 NOTE — FLOWSHEET NOTE
tasking activities, while increasing distance patient is to complete each exercises to challenge endurance as well. Most difficulty noted with cross over stepping from the back with occasional LOB being corrected with stepping strategy. [] No change. [] Other:    [x] Patient would continue to benefit from skilled physical therapy services in order to: progress her balance and confidence with mobility to allow her to be more inclined to go out in the community. GOALS:   LTG: (to be met in 6 treatments)  Pt will score above 20/30 on functional gait assessment to decrease overall fall risk with community level mobility. Pt will improve score on MiniBEST by 4 pt to 17pt to demonstrate a functional improvement in balance and decrease fall risk. Pt will be independent with home program addressing strength, flexibility and balance to maximize gains made in therapy to improve overall functional capacity for mobility. Pt will report no falls for x6 weeks demonstrating improved safety with mobility and implementation of fall prevention strategies. Plan: [x] Continue per plan of care.    [] Other:      Treatment Charges: Mins Units   []  Modalities     []  Ther Exercise     []  Manual Therapy     []  Ther Activities     []  Aquatics     [x]  Neuromuscular 38 3   [] Vasocompression     [] Gait Training     [] Dry needling        [] 1 or 2 muscles        [] 3 or more muscles     []  Other     Total Treatment time 38 3     Time In: 12:20            Time Out: 12:58    Electronically signed by:  Jose C Marie PTA

## 2023-09-29 ENCOUNTER — HOSPITAL ENCOUNTER (OUTPATIENT)
Dept: PHYSICAL THERAPY | Age: 88
Setting detail: THERAPIES SERIES
Discharge: HOME | End: 2023-09-29
Payer: MEDICARE

## 2023-09-29 PROCEDURE — 97112 NEUROMUSCULAR REEDUCATION: CPT

## 2023-09-29 NOTE — FLOWSHEET NOTE
knee marching + excessive arm swing 2x40ft                 Stretching            Seated HS stretch  2x30s ea                       Patient Education/Home program:   9/14: Access Code: [email or web address] // Exercises  - Seated Hamstring Stretch  - 1 x daily - 7 x weekly - 3 sets - 30 second hold  - Standing Tandem Balance with Counter Support  - 1 x daily - 7 x weekly - 3 sets - 30 seconds hold  - Forward Backward Weight Shift with Unilateral Counter Support  - 1 x daily - 7 x weekly - 3 sets - 20 reps  9/21: updated HEP Access Code: [email or web address] // Exercises  - Seated Hamstring Stretch  - 1 x daily - 7 x weekly - 3 sets - 30 second hold  - Standing Tandem Balance with Counter Support  - 1 x daily - 7 x weekly - 3 sets - 30 seconds hold  - Forward Backward Weight Shift with Unilateral Counter Support  - 1 x daily - 7 x weekly - 3 sets - 20 reps  - Standing Toe Taps  - 1 x daily - 7 x weekly - 3 sets - 20 reps  - Corner Balance Feet Together: Eyes Open With Head Turns  - 1 x daily - 7 x weekly - 3 sets - 20 reps       Pt. Education:  [x] Yes  [] No  [x] Reviewed Prior HEP/Ed  Method of Education: [x] Verbal  [x] Demo  [x] Written  Comprehension of Education:  [x] Verbalizes understanding. [x] Demonstrates understanding. [] Needs review. [x] Demonstrates/verbalizes HEP/Ed previously given. Specific Instructions for next treatment   - continue to progress balance challenges to help pt feel more confident   - consider adding step overs with a cognitive dual task , walking VOR, rocker board       Assessment: [] Progressing toward goals. [] No change. [x] Other: 9/29 Patient required more rest breaks than usual this session due to feeling more fatigued and SOB this session. Continued with standing exercises this date with patient demonstrating no LOB and CGA-SBA with exercises this date.       [x] Patient would continue to benefit from skilled physical therapy services in order to: progress her balance and confidence

## 2023-10-04 ENCOUNTER — HOSPITAL ENCOUNTER (OUTPATIENT)
Dept: PHYSICAL THERAPY | Age: 88
Setting detail: THERAPIES SERIES
Discharge: HOME | End: 2023-10-04
Payer: MEDICARE

## 2023-10-04 PROCEDURE — 97110 THERAPEUTIC EXERCISES: CPT

## 2023-10-04 NOTE — FLOWSHEET NOTE
[x] [facility]) - [facility] & Therapy  [address]  [place]: [phone]   F: [phone]     Physical Therapy Cancel/No Show note    Date: 10/4/2023  Patient: Manisha Arnett  : 1935  MRN: [id number]    Visit Count:   Cancels/No Shows to date:     For today's appointment patient:    [x]  Cancelled    [] Rescheduled appointment    [] No-show     Reason given by patient:    [x]  Patient ill- per  pt called to cx due to being ill    []  Conflicting appointment    [] No transportation      [] Conflict with work    [] No reason given    [] Weather related    [] ONV-    [] Other:      Comments:        [] Next appointment was confirmed    Electronically signed by: Omayra Hawkins, PTA

## 2023-10-10 ENCOUNTER — APPOINTMENT (OUTPATIENT)
Dept: PHYSICAL THERAPY | Age: 88
End: 2023-10-10
Payer: MEDICARE

## 2023-10-12 ENCOUNTER — HOSPITAL ENCOUNTER (OUTPATIENT)
Dept: PHYSICAL THERAPY | Age: 88
Setting detail: THERAPIES SERIES
Discharge: HOME | End: 2023-10-12
Payer: MEDICARE

## 2023-10-12 PROCEDURE — 97112 NEUROMUSCULAR REEDUCATION: CPT

## 2023-10-12 NOTE — FLOWSHEET NOTE
turns so will want to continue to increase confidence and dual tasking. Overall feel she is doing well but will assess next session to determine how she is doing. [] No change. [] Other:     [x] Patient would continue to benefit from skilled physical therapy services in order to: progress her balance and confidence with mobility to allow her to be more inclined to go out in the community. GOALS:   LTG: (to be met in 6 treatments)  Pt will score above 20/30 on functional gait assessment to decrease overall fall risk with community level mobility. Pt will improve score on MiniBEST by 4 pt to 17pt to demonstrate a functional improvement in balance and decrease fall risk. Pt will be independent with home program addressing strength, flexibility and balance to maximize gains made in therapy to improve overall functional capacity for mobility. Pt will report no falls for x6 weeks demonstrating improved safety with mobility and implementation of fall prevention strategies. Plan: [x] Continue per plan of care.    [] Other:      Treatment Charges: Mins Units   []  Modalities     []  Ther Exercise     []  Manual Therapy     []  Ther Activities     []  Aquatics     [x]  Neuromuscular 40 3   [] Vasocompression     [] Gait Training     [] Dry needling        [] 1 or 2 muscles        [] 3 or more muscles     []  Other     Total Treatment time 40 3     Time In:1118          Time Out: 5904    Electronically signed by:  Elena Polanco, PT

## 2023-10-17 ENCOUNTER — HOSPITAL ENCOUNTER (OUTPATIENT)
Dept: PHYSICAL THERAPY | Age: 88
Setting detail: THERAPIES SERIES
Discharge: HOME | End: 2023-10-17
Payer: MEDICARE

## 2023-10-17 PROCEDURE — 97112 NEUROMUSCULAR REEDUCATION: CPT

## 2023-10-17 NOTE — PROGRESS NOTES
[address] [facility]   [address]   Phone: [phone]   Fax: [phone]    Physical Therapy Daily Treatment Note/Progress note       Date:  10/17/2023  Patient Name:  Manisha Arnett    :  1935  MRN: [id number]  Physician: Damien Greene MD                                    Insurance: [employer] ($26 copay)   Medical Diagnosis: R26.89 (ICD-10-CM) - Balance problem     Rehab Codes: R26.81 unsteadiness on feet   Date of symptom onset: decline over the last 6 month   Next 's appt.: TBD   Visit# / total visits:   Cancels/No Shows: 0/0  Date of initial visit: 23        Date of PN: 10/17/23 (visit 6)  Formal progress note reporting period:  23 - 10/17/23     Precautions: hx of  B TKA      Subjective: Patient notes she is doing well and not having falls. She notes she is busy with a lot of things in her life which is overwhelming. Overall feeling better balance wise since coming to therapy. She notes she still wants to come to therapy to work on her balance as she does not want to go backwards. Still finds it difficult at times with doing stairs -- decreased confidence -- would like to improve on this.      Pain:  [] Yes  [x] No Location:  N/A Pain Rating: (0-10 scale) denies/10  Pain altered Tx:  [] No  [] Yes  Action:  Comments:    Objective:  INTERVENTIONS  Reps/ Time Weight/ Level Completed  Today Comments   Static balance            Fw/bkwd wt shifts -- EC  20x    For limited of stability training with light UE support    Tandem stance  2x30s ea     no UE support    Alternating toe taps  20x2 8 in  No UE support    Rhomberg head turns: H & V  2x30s ea Foam  No UE support, mild sway    Narrow JUANA, EC on foam 2x30s   No ue support   ABCs on Foam 1x 4# ball     Marching in place EC 20x                       Balance transitions            step ups to foam fwd  10xea     no UE support    Step up to foam lateral  10x ea   No

## 2023-10-19 ENCOUNTER — HOSPITAL ENCOUNTER (OUTPATIENT)
Dept: PHYSICAL THERAPY | Age: 88
Setting detail: THERAPIES SERIES
Discharge: HOME | End: 2023-10-19
Payer: MEDICARE

## 2023-10-19 PROCEDURE — 97112 NEUROMUSCULAR REEDUCATION: CPT

## 2023-10-19 NOTE — FLOWSHEET NOTE
[address] [facility]   [address]   Phone: [phone]   Fax: [phone]    Physical Therapy Daily Treatment Note/Progress note       Date:  10/19/2023  Patient Name:  Joaquina Sylvester    :  1935  MRN: [id number]  Physician: Ben Jones MD                                    Insurance: [address] ($63 copay)   Medical Diagnosis: R26.89 (ICD-10-CM) - Balance problem     Rehab Codes: R26.81 unsteadiness on feet   Date of symptom onset: decline over the last 6 month   Next 's appt.: TBD   Visit# / total visits:   Cancels/No Shows: 0/0  Date of initial visit: 23           Precautions: hx of  B TKA      Subjective: Patient arrives to therapy after swimming today at the [facility]. She reports feeling a little tired from the pool, but other than that she is okay. Patient is ready and willing to participate in therapy today.     Pain:  [] Yes  [x] No Location:  N/A Pain Rating: (0-10 scale) denies/10  Pain altered Tx:  [] No  [] Yes  Action:  Comments:    Objective:  INTERVENTIONS  Reps/ Time Weight/ Level Completed  Today Comments   Static balance            Fw/bkwd wt shifts -- EC  20x    For limited of stability training with light UE support    Tandem stance  2x30s ea     no UE support    Alternating toe taps  20x2 8 in  No UE support    Rhomberg head turns: H & V  2x30s ea Foam  No UE support, mild sway    Narrow JUANA, EC on foam 2x30s   No ue support   ABCs on Foam 1x 4# ball     Marching in place EC 20x                       Balance transitions            step ups to foam fwd  10xea     no UE support    Step up to foam lateral  10x ea   No UE support    Step over and back from 6\" stephanie fwd/lat 2x10ea    Added 10/17/23   CGA w/gait belt  Pt has apprehension with exercise because of FOF  Able to recover from loss of balance within 1 step   Dual tasking           Walking with ball toss + cognitive task (counting up by 2's)  2x25ft

## 2023-10-20 ENCOUNTER — APPOINTMENT (OUTPATIENT)
Dept: PHYSICAL THERAPY | Age: 88
End: 2023-10-20
Payer: MEDICARE

## 2023-10-26 ENCOUNTER — HOSPITAL ENCOUNTER (OUTPATIENT)
Dept: PHYSICAL THERAPY | Age: 88
Setting detail: THERAPIES SERIES
Discharge: HOME | End: 2023-10-26
Payer: MEDICARE

## 2023-10-26 PROCEDURE — 97112 NEUROMUSCULAR REEDUCATION: CPT

## 2023-10-26 NOTE — FLOWSHEET NOTE
cognitive challenges). Accuracy improved significantly from last session and time decreased significantly from last session, indicating an improvement in not only balance and cognition, but in dual tasking as a whole. Less verbal cueing required for remembering order of the alphabet. Continued to work on dual tasking while ambulating (bouncing a ball and counting, catching a ball and counting, marching into a ball and saying colors) and pt shows improvement in these tasks, but requires increased rest breaks during these interventions. Pt performed balance activities on foam to end session. Overall, balance is improving, but pt will still benefit from skilled PT in order to improve balance with head turning (horizontal and vertical) and all balance with EC. Pt compensates for stability in tandem stance by over pronating L ankle, so cueing is required in order to remind pt to straighten ankle. [] Other:     [x] Patient would continue to benefit from skilled physical therapy services in order to: progress her balance and confidence with mobility to allow her to be more inclined to go out in the community. GOALS:   LTG: (to be met in 6 treatments)  Pt will score above 20/30 on functional gait assessment to decrease overall fall risk with community level mobility. Pt will improve score on MiniBEST by 4 pt to 17pt to demonstrate a functional improvement in balance and decrease fall risk. Pt will be independent with home program addressing strength, flexibility and balance to maximize gains made in therapy to improve overall functional capacity for mobility. Pt will report no falls for x6 weeks demonstrating improved safety with mobility and implementation of fall prevention strategies. New goals 10/17:   5. Pt will navigate steps with no hesitancy showing improved confidence and decreased fall risk.    6. Pt will be able to walk while doing a cognitive task with < 10% difference showing better

## 2023-11-02 ENCOUNTER — HOSPITAL ENCOUNTER (OUTPATIENT)
Dept: PHYSICAL THERAPY | Age: 88
Setting detail: THERAPIES SERIES
Discharge: HOME | End: 2023-11-02
Payer: MEDICARE

## 2023-11-02 PROCEDURE — 97112 NEUROMUSCULAR REEDUCATION: CPT

## 2023-11-02 NOTE — FLOWSHEET NOTE
flexibility and balance to maximize gains made in therapy to improve overall functional capacity for mobility. Pt will report no falls for x6 weeks demonstrating improved safety with mobility and implementation of fall prevention strategies. New goals 10/17:   5. Pt will navigate steps with no hesitancy showing improved confidence and decreased fall risk. 6. Pt will be able to walk while doing a cognitive task with < 10% difference showing better multi-tasking ability needed for navigating a store or community environments. Plan: [x] Continue per plan of care. [] Other: See below            Treatment Charges: Mins Units   []  Modalities     []  Ther Exercise     []  Manual Therapy     []  Ther Activities     []  Aquatics     [x]  Neuromuscular 49 3   [] Vasocompression     [] Gait Training     [] Dry needling        [] 1 or 2 muscles        [] 3 or more muscles     []  Other     Total Treatment time 46 3     Time In:  1:01pm    Time Out: 1:47pm    Electronically signed by:  TONY Feliz      Cosigned: PT evaluation/treatment is completed by Luis Bertrand under the direct supervision of co-signing therapist, who agrees with all documentation and evaluation/treatment.    Torito Wadsworth, PT, DPT   #[phone]

## 2023-11-13 ENCOUNTER — HOSPITAL ENCOUNTER (OUTPATIENT)
Dept: PHYSICAL THERAPY | Age: 88
Setting detail: THERAPIES SERIES
Discharge: HOME | End: 2023-11-13
Payer: MEDICARE

## 2023-11-13 PROCEDURE — 97112 NEUROMUSCULAR REEDUCATION: CPT

## 2023-11-15 ENCOUNTER — OFFICE VISIT (OUTPATIENT)
Dept: ORTHOPEDIC SURGERY | Age: 88
End: 2023-11-15
Payer: MEDICARE

## 2023-11-15 VITALS — HEIGHT: 63 IN | BODY MASS INDEX: 29.23 KG/M2 | RESPIRATION RATE: 14 BRPM | WEIGHT: 165 LBS

## 2023-11-15 DIAGNOSIS — M25.551 HIP PAIN, RIGHT: Primary | ICD-10-CM

## 2023-11-15 DIAGNOSIS — M25.561 RIGHT KNEE PAIN, UNSPECIFIED CHRONICITY: ICD-10-CM

## 2023-11-15 PROCEDURE — 1123F ACP DISCUSS/DSCN MKR DOCD: CPT | Performed by: ORTHOPAEDIC SURGERY

## 2023-11-15 PROCEDURE — 99213 OFFICE O/P EST LOW 20 MIN: CPT | Performed by: ORTHOPAEDIC SURGERY

## 2023-11-15 NOTE — PROGRESS NOTES
Mina Benites M.D.            [address] [facility], [address] [facility], [address] [place] [address] [address]           Dept Phone: [phone]           Dept Fax:  [address]          Dept Phone: [phone]           Dept Fax:  [phone]      Chief Compliant:  Chief Complaint   Patient presents with    Hip Pain    Knee Pain     Rt knee and Hip        History of Present Illness: This is a 80 y.o. female who presents to the clinic today for evaluation / follow up of severe right hip pain. Patient's had hip pain for quite some time. She has a history of bilateral total knee replacements done by me 22 years ago she has no complaints with her right knees at all. She has difficulty lifting her leg difficulty getting out of cars and chairs etc..       Review of Systems   Constitutional: Negative for fever, chills, sweats. Eyes: Negative for changes in vision, or pain. HENT: Negative for ear ache, epistaxis, or sore throat. Respiratory/Cardio: Negative for Chest pain, palpitations, SOB, or cough. Gastrointestinal: Negative for abdominal pain, N/V/D. Genitourinary: Negative for dysuria, frequency, urgency, or hematuria. Neurological: Negative for headache, numbness, or weakness. Integumentary: Negative for rash, itching, laceration, or abrasion. Musculoskeletal: Positive for Hip Pain and Knee Pain (Rt knee and Hip)       Physical Exam:  Constitutional: Patient is oriented to person, place, and time. Patient appears well-developed and well nourished. HENT: Negative otherwise noted  Head: Normocephalic and Atraumatic  Nose: Normal  Eyes: Conjunctivae and EOM are normal  Neck: Normal range of motion Neck supple. Respiratory/Cardio: Effort normal. No respiratory distress.   Musculoskeletal: Examination of the patient's right hip notes she has

## 2023-11-16 ENCOUNTER — TELEPHONE (OUTPATIENT)
Dept: INTERVENTIONAL RADIOLOGY/VASCULAR | Age: 88
End: 2023-11-16

## 2023-11-16 ENCOUNTER — HOSPITAL ENCOUNTER (OUTPATIENT)
Dept: PHYSICAL THERAPY | Age: 88
Setting detail: THERAPIES SERIES
Discharge: HOME | End: 2023-11-16
Payer: MEDICARE

## 2023-11-16 PROCEDURE — 97112 NEUROMUSCULAR REEDUCATION: CPT

## 2023-11-16 NOTE — TELEPHONE ENCOUNTER
Attempted to schedule pt for injection. Pt stated that she would like to hold off at this time. Call back number was given when and if she decides to schedule.

## 2023-11-16 NOTE — FLOWSHEET NOTE
[facility]   [address]   Phone: [phone]   Fax: [phone]    Physical Therapy Daily Treatment Note      Date:  2023  Patient Name:  Joaquina Looney    :  1935  MRN: [id number]  Physician: Maris Hinton MD                                    Insurance: [employer] ($63 copay)   Medical Diagnosis: R26.89 (ICD-10-CM) - Balance problem     Rehab Codes: R26.81 unsteadiness on feet   Date of symptom onset: decline over the last 6 month   Next 's appt.: TBD   Visit# / total visits:   Cancels/No Shows: 0/0  Date of initial visit: 23           Precautions: hx of  B TKA      Subjective:  Patient says she saw Dr. Karen Jesus yesterday and he recommended getting an injection to help with pain. Feels her hip is feeling good today after doing her aquatic exercise.        Pain:  [x] Yes  [] No Location:  R leg   Pain Rating: (0-10 scale) 0/10 - at rest  /// 2/10 with walking into session  Pain altered Tx:  [] No  [x] Yes: Pain increased with single leg activities, so made focus of session more on double limb support activites  Comments:    Objective:  INTERVENTIONS  Reps/ Time Weight/ Level Completed  Today Comments   Static balance            Tandem stance with arm movements (f + up) 10x2    x  no UE support   CGA     Alternating toe taps  20x2 cone x No UE support    Rhomberg head turns: H & V  2x30s ea Foam  No UE support, mild sway     One EO  One EC   Narrow UJANA, EC on foam 2x30s   No ue support   ABCs on Foam 1x 4# ball  -shoulders tired at end of ABCs   Marching in place EC 20x      3 way hip (no UE support) 10x ea      3 way slides 10x ea      Step ups (fwd) no UE support 10x ea   Only able to complete 4 with R foot leading due to pain   Rocker board weight shifts (fwd/lat) 2x30 ea  X No UE support, CGA    Rocker board balance (fwd/lat)  2x30s ea  x    Forward step onto bosu ball + 5\" hold   15x ea   Cueing to keep

## 2023-11-28 ENCOUNTER — HOSPITAL ENCOUNTER (OUTPATIENT)
Dept: PHYSICAL THERAPY | Age: 88
Setting detail: THERAPIES SERIES
Discharge: HOME | End: 2023-11-28
Payer: MEDICARE

## 2023-11-28 NOTE — CARE COORDINATION
Pt and wife requesting to leave. MD notified.   [] [address] [facility]  [address]  [place] [facility] [place] [facility]": [phone]   F: [phone]     Physical Therapy Cancel/No Show note    Date: 2023  Patient: Mary Barahona  : 1935  MRN: [id number]    Visit Count:   Cancels/No Shows to date:     For today's appointment patient:    [x]  Cancelled    [] Rescheduled appointment    [] No-show     Reason given by patient:    []  Patient ill    []  Conflicting appointment    [] No transportation      [] Conflict with work    [x] No reason given    [] Weather related    [] COVID-19    [] Other:      Comments:  needs to be called to be rescheduled for last visit.  Progress note       [] Next appointment was confirmed    Electronically signed by: Loretha Libman, PT

## 2023-11-29 ENCOUNTER — HOSPITAL ENCOUNTER (OUTPATIENT)
Dept: INTERVENTIONAL RADIOLOGY/VASCULAR | Age: 88
Discharge: HOME | End: 2023-12-01
Payer: MEDICARE

## 2023-11-29 DIAGNOSIS — M25.551 HIP PAIN, RIGHT: ICD-10-CM

## 2023-11-29 PROCEDURE — 77002 NEEDLE LOCALIZATION BY XRAY: CPT

## 2023-11-29 PROCEDURE — 2500000003 HC RX 250 WO HCPCS: Performed by: RADIOLOGY

## 2023-11-29 PROCEDURE — 20610 DRAIN/INJ JOINT/BURSA W/O US: CPT

## 2023-11-29 PROCEDURE — 6360000004 HC RX CONTRAST MEDICATION: Performed by: RADIOLOGY

## 2023-11-29 PROCEDURE — 2709999900 IR ARTHR/ASP/INJ MAJOR JT/BURSA RIGHT WO US

## 2023-11-29 PROCEDURE — 6360000002 HC RX W HCPCS: Performed by: RADIOLOGY

## 2023-11-29 RX ORDER — BUPIVACAINE HYDROCHLORIDE 5 MG/ML
INJECTION, SOLUTION EPIDURAL; INTRACAUDAL PRN
Status: COMPLETED | OUTPATIENT
Start: 2023-11-29 | End: 2023-11-29

## 2023-11-29 RX ORDER — LIDOCAINE HYDROCHLORIDE 10 MG/ML
INJECTION, SOLUTION EPIDURAL; INFILTRATION; INTRACAUDAL; PERINEURAL PRN
Status: COMPLETED | OUTPATIENT
Start: 2023-11-29 | End: 2023-11-29

## 2023-11-29 RX ORDER — METHYLPREDNISOLONE ACETATE 80 MG/ML
INJECTION, SUSPENSION INTRA-ARTICULAR; INTRALESIONAL; INTRAMUSCULAR; SOFT TISSUE PRN
Status: COMPLETED | OUTPATIENT
Start: 2023-11-29 | End: 2023-11-29

## 2023-11-29 RX ADMIN — IOPAMIDOL 3 ML: 612 INJECTION, SOLUTION INTRAVENOUS at 15:09

## 2023-11-29 RX ADMIN — BUPIVACAINE HYDROCHLORIDE 4 ML: 5 INJECTION, SOLUTION EPIDURAL; INTRACAUDAL; PERINEURAL at 15:08

## 2023-11-29 RX ADMIN — LIDOCAINE HYDROCHLORIDE 4 ML: 10 INJECTION, SOLUTION EPIDURAL; INFILTRATION; INTRACAUDAL; PERINEURAL at 15:07

## 2023-11-29 RX ADMIN — METHYLPREDNISOLONE ACETATE 80 MG: 80 INJECTION, SUSPENSION INTRA-ARTICULAR; INTRALESIONAL; INTRAMUSCULAR; SOFT TISSUE at 15:08

## 2023-11-29 NOTE — OR NURSING
Rt hip prepped draped. Numbed and accessed by Dr Stacy Sorto. Injected as per MAR total 7 ml.  Needle removed and band aid applied Tolerated well Verbalizes understanding of discharge instructions

## 2023-11-29 NOTE — DISCHARGE INSTRUCTIONS
After your arthrogram:    You should be able to return to a normal routine the next day. Please refrain from strenuous athletic activity for 24-48 hours. Your joint will return to its pre-arthrogram state within 48 hours. Most of the contrast medium will be absorbed by the first 24 hours and any gas should be absorbed by 48 hours. Slight swelling and discomfort may occur after the exam which can be relieved with ice compresses. If you develop signs of infection (redness, swelling, drainage, fever > 101 degrees) call your physician immediately. If the joint becomes red, hot and painfully swollen or itchy, please contact your physician immediately. Your doctor will have the results of this test by three working days from today. Please call that office for follow-up treatment. If you have questions, you can contact our department at [phone] for further information     IF YOU [address], GO TO THE NEAREST [address] [place] [address].

## 2023-11-29 NOTE — BRIEF OP NOTE
Brief Postoperative Note    Magali Pierre  YOB: 1935  [id number]    Pre-operative Diagnosis: Rt hip osteoarthritis and chronic pain    Post-operative Diagnosis: Same    Procedure: Rt hip injection for pain    Medications Given: none    Anesthesia: Local    Surgeons/Assistants: Ervin Ernst MD    Estimated Blood Loss: minimal    Complications: none    Specimens: were not obtained    Findings: Rt hip injected with steroid and anesthetics (Sensorcaine instead of marcaine) as per Dr Christiano Anthony. Pt tolerated well.       Electronically signed by Ervin Ernst MD on 11/29/2023 at 3:30 PM

## 2023-12-11 RX ORDER — DICLOFENAC SODIUM 75 MG/1
TABLET, DELAYED RELEASE ORAL
Qty: 180 TABLET | Refills: 3 | Status: SHIPPED | OUTPATIENT
Start: 2023-12-11

## 2023-12-11 RX ORDER — OMEPRAZOLE 20 MG/1
CAPSULE, DELAYED RELEASE ORAL
Qty: 90 CAPSULE | Refills: 3 | Status: SHIPPED | OUTPATIENT
Start: 2023-12-11

## 2024-02-27 ENCOUNTER — TELEPHONE (OUTPATIENT)
Dept: PRIMARY CARE CLINIC | Age: 89
End: 2024-02-27

## 2024-02-27 DIAGNOSIS — Z12.31 ENCOUNTER FOR SCREENING MAMMOGRAM FOR BREAST CANCER: Primary | ICD-10-CM

## 2024-02-27 NOTE — TELEPHONE ENCOUNTER
Mich called into office stating patient will be due for her diagnostic mammogram as of 3/23/2024 and needs a new order for [facility] AISHWARYA DIGITAL SCREEN BILATERAL faxed over to [phone].        She denies any changes or complaints

## 2024-04-29 ENCOUNTER — OFFICE VISIT (OUTPATIENT)
Dept: ORTHOPEDIC SURGERY | Age: 89
End: 2024-04-29
Payer: MEDICARE

## 2024-04-29 DIAGNOSIS — M16.11 PRIMARY OSTEOARTHRITIS OF RIGHT HIP: Primary | ICD-10-CM

## 2024-04-29 PROCEDURE — 99212 OFFICE O/P EST SF 10 MIN: CPT | Performed by: ORTHOPAEDIC SURGERY

## 2024-04-29 PROCEDURE — 1123F ACP DISCUSS/DSCN MKR DOCD: CPT | Performed by: ORTHOPAEDIC SURGERY

## 2024-04-29 RX ORDER — TRAMADOL HYDROCHLORIDE 50 MG/1
50 TABLET ORAL EVERY 6 HOURS PRN
Qty: 28 TABLET | Refills: 0 | Status: SHIPPED | OUTPATIENT
Start: 2024-04-29 | End: 2024-05-06

## 2024-04-29 NOTE — PROGRESS NOTES
[facility]                   Aristeo See M.D.            [address], [address]               [place], [place] [address]           Dept Phone: [phone]           Dept Fax:  [phone] [address] [facility]                       [address]           [place], [place] [address]          Dept Phone: [phone]           Dept Fax:  [phone]      Chief Compliant:  Chief Complaint   Patient presents with    Hip Pain     Rt hip        History of Present Illness:  This is a 88 y.o. female who presents to the clinic today for evaluation / follow up of right hip pain.  Please be refer to previous dictation she has fairly severe degenerative joint disease of the right hip she did get an IR injection she stated lasted about a month and 1/2 to 2 months.  She is here today to have a discussion regarding hip arthroplasty versus continued injections.       Review of Systems   Constitutional: Negative for fever, chills, sweats.   Eyes: Negative for changes in vision, or pain.   HENT: Negative for ear ache, epistaxis, or sore throat.  Respiratory/Cardio: Negative for Chest pain, palpitations, SOB, or cough.  Gastrointestinal: Negative for abdominal pain, N/V/D.   Genitourinary: Negative for dysuria, frequency, urgency, or hematuria.   Neurological: Negative for headache, numbness, or weakness.   Integumentary: Negative for rash, itching, laceration, or abrasion.   Musculoskeletal: Positive for Hip Pain (Rt hip)       Physical Exam:  Constitutional: Patient is oriented to person, place, and time. Patient appears well-developed and well nourished.   HENT: Negative otherwise noted  Head: Normocephalic and Atraumatic  Nose: Normal  Eyes: Conjunctivae and EOM are normal  Neck: Normal range of motion Neck supple.    Respiratory/Cardio: Effort normal. No respiratory distress.  Musculoskeletal: Examination was done very briefly only that she has significant pain with any kind

## 2024-04-30 DIAGNOSIS — Z12.31 ENCOUNTER FOR SCREENING MAMMOGRAM FOR BREAST CANCER: ICD-10-CM

## 2024-05-06 ENCOUNTER — OFFICE VISIT (OUTPATIENT)
Dept: PRIMARY CARE CLINIC | Age: 89
End: 2024-05-06
Payer: MEDICARE

## 2024-05-06 VITALS
HEART RATE: 76 BPM | WEIGHT: 165.4 LBS | DIASTOLIC BLOOD PRESSURE: 76 MMHG | BODY MASS INDEX: 29.3 KG/M2 | SYSTOLIC BLOOD PRESSURE: 138 MMHG | HEIGHT: 63 IN | OXYGEN SATURATION: 96 %

## 2024-05-06 DIAGNOSIS — M54.16 LUMBAR RADICULOPATHY: Primary | ICD-10-CM

## 2024-05-06 PROCEDURE — 99213 OFFICE O/P EST LOW 20 MIN: CPT | Performed by: FAMILY MEDICINE

## 2024-05-06 PROCEDURE — 1123F ACP DISCUSS/DSCN MKR DOCD: CPT | Performed by: FAMILY MEDICINE

## 2024-05-06 RX ORDER — ACETAMINOPHEN 500 MG
500 TABLET ORAL EVERY 6 HOURS PRN
COMMUNITY

## 2024-05-06 SDOH — ECONOMIC STABILITY: FOOD INSECURITY: WITHIN THE PAST 12 MONTHS, THE FOOD YOU BOUGHT JUST DIDN'T LAST AND YOU DIDN'T HAVE MONEY TO GET MORE.: NEVER TRUE

## 2024-05-06 SDOH — ECONOMIC STABILITY: FOOD INSECURITY: WITHIN THE PAST 12 MONTHS, YOU WORRIED THAT YOUR FOOD WOULD RUN OUT BEFORE YOU GOT MONEY TO BUY MORE.: NEVER TRUE

## 2024-05-06 SDOH — ECONOMIC STABILITY: INCOME INSECURITY: HOW HARD IS IT FOR YOU TO PAY FOR THE VERY BASICS LIKE FOOD, HOUSING, MEDICAL CARE, AND HEATING?: NOT HARD AT ALL

## 2024-05-06 ASSESSMENT — PATIENT HEALTH QUESTIONNAIRE - PHQ9
SUM OF ALL RESPONSES TO PHQ QUESTIONS 1-9: 2
2. FEELING DOWN, DEPRESSED OR HOPELESS: SEVERAL DAYS
SUM OF ALL RESPONSES TO PHQ QUESTIONS 1-9: 2
1. LITTLE INTEREST OR PLEASURE IN DOING THINGS: SEVERAL DAYS
SUM OF ALL RESPONSES TO PHQ QUESTIONS 1-9: 2
SUM OF ALL RESPONSES TO PHQ QUESTIONS 1-9: 2
SUM OF ALL RESPONSES TO PHQ9 QUESTIONS 1 & 2: 2

## 2024-05-06 NOTE — PROGRESS NOTES
[facility]  [facility] PRIMARY CARE  [address] [facility] [address]  Dept: [phone]    Amina Woods is a 88 y.o. female Established patient, who presents today for her medical conditions/complaints as noted below.      Chief Complaint   Patient presents with    Hip Pain     Pt states she has been having RT hip pain x4 months and worsening.       HPI:   Rt hip and leg hurting.  Burning pain in thigh and pain in groin.  Wakes her up at night.  Taking tyl arth, pain meds  (Tramadol) from Dr. See did the not help. Some numbness in leg at times.  Hard to put her socks on.   Injection in her hip in November helped for about a month and then wore off.  Did not make it completely better.  Is much better when she is in the pool.      Reviewed prior notes: None   Reviewed previous:   PT    No components found for: \"LDLCHOLESTEROL\", \"LDLCALC\"    (goal LDL is <100)   AST (U/L)   Date Value   11/14/2012 17     ALT (U/L)   Date Value   11/14/2012 15     BUN (mg/dL)   Date Value   11/19/2013 17     Hemoglobin A1C (%)   Date Value   11/19/2013 5.6     BP Readings from Last 3 Encounters:   05/06/24 138/76   08/14/23 130/76   04/07/23 (!) 142/70          (goal 120/80)    Past Medical History:   Diagnosis Date    Basal cell carcinoma     mulitple    Blood in feces 9/19/2016    Hyperlipidemia 8/30/2011    Mild hyperglycemia. 8/30/2011    Orthostatic hypotension 8/30/2011    Osteoarthritis     knees, thumbs    Post-menopausal     Pre-cancerous skin lesions. 8/30/2011    Psoriasis 8/30/2011    Syncope, non cardiac     \"vascular syncope\" per patient      Past Surgical History:   Procedure Laterality Date    APPENDECTOMY      during hyst    CATARACT REMOVAL WITH IMPLANT Bilateral     COLON SURGERY      fistula repair- pt not sure    COLONOSCOPY  1997, 2002    FOOT SURGERY      hamm[place]oe    HYSTERECTOMY (CERVIX STATUS UNKNOWN)      complete, d/t fibroids    JOINT REPLACEMENT  2001    Right

## 2024-05-10 ENCOUNTER — TELEPHONE (OUTPATIENT)
Dept: ORTHOPEDIC SURGERY | Age: 89
End: 2024-05-10

## 2024-05-10 NOTE — TELEPHONE ENCOUNTER
FYI: Patient's daughter Karolyn called to let us know that Amina was seen by Dr Estrella who thinks that her pain is mostly from the Sciatica and not her hip.  The patient was given a script for physical therapy and is going to try that before committing to a total hip.

## 2024-05-17 ENCOUNTER — TELEPHONE (OUTPATIENT)
Dept: PRIMARY CARE CLINIC | Age: 89
End: 2024-05-17

## 2024-05-17 DIAGNOSIS — M79.606 PAIN OF LOWER EXTREMITY, UNSPECIFIED LATERALITY: ICD-10-CM

## 2024-05-17 DIAGNOSIS — M54.16 LUMBAR RADICULOPATHY: Primary | ICD-10-CM

## 2024-05-17 NOTE — TELEPHONE ENCOUNTER
Spoke with pt regarding note from chiropractor. Dr Estrella ordered x rays. Pt will get them done at [facility]. Pt asking about blood work, she is not due until August. Advised pt if sx worsen to call back for a sooner appt.

## 2024-05-20 ENCOUNTER — HOSPITAL ENCOUNTER (OUTPATIENT)
Dept: GENERAL RADIOLOGY | Age: 89
Discharge: HOME | End: 2024-05-22
Payer: MEDICARE

## 2024-05-20 ENCOUNTER — HOSPITAL ENCOUNTER (OUTPATIENT)
Age: 89
Discharge: HOME | End: 2024-05-22
Payer: MEDICARE

## 2024-05-20 DIAGNOSIS — M79.606 PAIN OF LOWER EXTREMITY, UNSPECIFIED LATERALITY: ICD-10-CM

## 2024-05-20 DIAGNOSIS — M54.16 LUMBAR RADICULOPATHY: ICD-10-CM

## 2024-05-20 PROCEDURE — 72100 X-RAY EXAM L-S SPINE 2/3 VWS: CPT

## 2024-05-20 PROCEDURE — 72170 X-RAY EXAM OF PELVIS: CPT

## 2024-05-24 ENCOUNTER — TELEPHONE (OUTPATIENT)
Dept: PRIMARY CARE CLINIC | Age: 89
End: 2024-05-24

## 2024-05-24 DIAGNOSIS — M54.16 LUMBAR RADICULOPATHY: Primary | ICD-10-CM

## 2024-06-10 ENCOUNTER — TELEPHONE (OUTPATIENT)
Dept: PRIMARY CARE CLINIC | Age: 89
End: 2024-06-10

## 2024-06-10 DIAGNOSIS — M79.604 RIGHT LEG PAIN: Primary | ICD-10-CM

## 2024-06-10 DIAGNOSIS — M79.89 RIGHT LEG SWELLING: ICD-10-CM

## 2024-06-10 NOTE — TELEPHONE ENCOUNTER
Pt having right leg swelling and pain. Denies hot to the touch. Asking for a doppler order. States hard to sleep at night.

## 2024-06-12 ENCOUNTER — HOSPITAL ENCOUNTER (OUTPATIENT)
Dept: VASCULAR LAB | Age: 89
Discharge: HOME | End: 2024-06-14
Payer: MEDICARE

## 2024-06-12 DIAGNOSIS — M79.89 RIGHT LEG SWELLING: ICD-10-CM

## 2024-06-12 DIAGNOSIS — M79.604 RIGHT LEG PAIN: ICD-10-CM

## 2024-06-12 PROCEDURE — 93971 EXTREMITY STUDY: CPT

## 2024-06-14 ENCOUNTER — TELEPHONE (OUTPATIENT)
Dept: PRIMARY CARE CLINIC | Age: 89
End: 2024-06-14

## 2024-06-14 DIAGNOSIS — M54.16 LUMBAR RADICULOPATHY: Primary | ICD-10-CM

## 2024-06-14 NOTE — TELEPHONE ENCOUNTER
Patients daughter is requesting a referral to pain management for patients hip and back pain.    If agreed please sent to [facility] p: [phone], f: [phone]. If agreed call patient back to let her know the referral was sent, okay to leave a vm, p: [phone].

## 2024-06-28 ENCOUNTER — TELEPHONE (OUTPATIENT)
Dept: ORTHOPEDIC SURGERY | Age: 89
End: 2024-06-28

## 2024-06-28 NOTE — TELEPHONE ENCOUNTER
The patient's daughter Karolyn called stating that her mother forgot to ask for stronger pain medication when she scheduled an appointment earlier today. Advised Karolyn that I cannot find any communication sheet in her mother's file stating I can speak to her. Additionally, informed Karolyn that it's 3:58 on a Friday and I don't have any provider working currently to sign a medication. Karolyn stated that her mother is okay and will call back on Monday if she is still in pain.

## 2024-07-10 ENCOUNTER — TELEPHONE (OUTPATIENT)
Dept: PRIMARY CARE CLINIC | Age: 89
End: 2024-07-10

## 2024-07-10 ENCOUNTER — OFFICE VISIT (OUTPATIENT)
Dept: ORTHOPEDIC SURGERY | Age: 89
End: 2024-07-10

## 2024-07-10 DIAGNOSIS — M16.11 PRIMARY OSTEOARTHRITIS OF RIGHT HIP: Primary | ICD-10-CM

## 2024-07-10 NOTE — PROGRESS NOTES
Patient returns today she is here for to discuss her right hip.  Please refer to all previous dictations.  Patient has had IR injection of this in the past and only helped out for a month and a half maybe 2 months she is at the point where she is in extreme pain she cannot sleep she feels that she is good to fall all the time and has significant problems and having a poor quality of life.  She is here to discuss right total of arthroplasty and is here with her daughter    Examination was briefly done patient basically has no internal/external Tatian of the right hip and has severe pain with any kind of motion.    Most recent x-rays taken this past May show severe end-stage degenerative joint disease of the right hip    Plan  I do lengthy discussion with the patient and her daughter regarding this and feel that she is at a point where her quality life is become significantly diminished and she would benefit significantly from right total of arthroplasty.  We did discuss the details of procedure as well as risk and benefits.  She would obviously need to have preoperative clearance per Dr. Estrella but she appears to be otherwise very healthy regarding someone for her age.  She does live by herself and more likely will require from skilled nursing facility postoperatively and she is agreeable to this.  Will get the patient scheduled accordingly.

## 2024-07-10 NOTE — TELEPHONE ENCOUNTER
Patients daughter called and states patient is needing hip replacement, and there will be a surgery clearance coming from Dr. Villa office for this.    Patient also told Dr. Villa today that Tramadol is not helping with pain and Dr. Villa does not want to prescribe narcotics, and patient will not take them, he said to call PCP to see what you may be able to prescribe for her. Liana in [facility].

## 2024-07-12 ENCOUNTER — HOSPITAL ENCOUNTER (OUTPATIENT)
Age: 89
Setting detail: SPECIMEN
Discharge: HOME | End: 2024-07-12

## 2024-07-12 ENCOUNTER — OFFICE VISIT (OUTPATIENT)
Dept: PRIMARY CARE CLINIC | Age: 89
End: 2024-07-12
Payer: MEDICARE

## 2024-07-12 VITALS
SYSTOLIC BLOOD PRESSURE: 136 MMHG | BODY MASS INDEX: 28.92 KG/M2 | DIASTOLIC BLOOD PRESSURE: 68 MMHG | HEIGHT: 63 IN | HEART RATE: 92 BPM | OXYGEN SATURATION: 96 % | WEIGHT: 163.2 LBS

## 2024-07-12 DIAGNOSIS — R39.198 DIFFICULTY URINATING: ICD-10-CM

## 2024-07-12 DIAGNOSIS — M16.11 PRIMARY OSTEOARTHRITIS OF RIGHT HIP: Primary | ICD-10-CM

## 2024-07-12 PROCEDURE — 1123F ACP DISCUSS/DSCN MKR DOCD: CPT | Performed by: FAMILY MEDICINE

## 2024-07-12 PROCEDURE — 99213 OFFICE O/P EST LOW 20 MIN: CPT | Performed by: FAMILY MEDICINE

## 2024-07-12 PROCEDURE — 81003 URINALYSIS AUTO W/O SCOPE: CPT | Performed by: FAMILY MEDICINE

## 2024-07-12 RX ORDER — CALCIUM CARBONATE/VITAMIN D3 500MG-5MCG
1 TABLET ORAL DAILY
Qty: 30 TABLET | COMMUNITY
Start: 2024-07-12

## 2024-07-12 RX ORDER — GABAPENTIN 100 MG/1
100 CAPSULE ORAL DAILY PRN
Qty: 30 CAPSULE | Refills: 0 | Status: SHIPPED | OUTPATIENT
Start: 2024-07-12 | End: 2024-08-11

## 2024-07-12 ASSESSMENT — ENCOUNTER SYMPTOMS
ABDOMINAL PAIN: 0
WHEEZING: 0
DIARRHEA: 0
VOMITING: 0
SORE THROAT: 0
COUGH: 0
EYE DISCHARGE: 0
RHINORRHEA: 0
SHORTNESS OF BREATH: 1
NAUSEA: 0
EYE REDNESS: 0

## 2024-07-12 NOTE — PROGRESS NOTES
thyromegaly.      Trachea: No tracheal deviation.   Cardiovascular:      Rate and Rhythm: Normal rate and regular rhythm.      Heart sounds: Normal heart sounds.   Pulmonary:      Effort: Pulmonary effort is normal. No respiratory distress.      Breath sounds: Normal breath sounds. No wheezing.   Lymphadenopathy:      Cervical: No cervical adenopathy.   Skin:     General: Skin is warm.      Findings: No rash.   Neurological:      Mental Status: She is alert and oriented to person, place, and time.   Psychiatric:         Mood and Affect: Mood normal.         Behavior: Behavior normal.         Thought Content: Thought content normal.         Assessment/Plan:   1. Primary osteoarthritis of right hip  Assessment & Plan:    Stop all vitamins/ herbals and asa a week a head of surgery.  Cleared for surgery-   2. Difficulty urinating  -     POCT Urinalysis No Micro (Auto)       Return in about 3 months (around 10/12/2024) for MWV.  Data Unavailable     Orders Placed This Encounter   Procedures    POCT Urinalysis No Micro (Auto)     Orders Placed This Encounter   Medications    Calcium Carb-Cholecalciferol (CALCIUM 500 + D) 500-5 MG-MCG TABS     Sig: Take 1 tablet by mouth daily     Dispense:  30 tablet    gabapentin (NEURONTIN) 100 MG capsule     Sig: Take 1 capsule by mouth daily as needed (pain) for up to 30 days. Intended supply: 30 days     Dispense:  30 capsule     Refill:  0       Patient given educational materials - see patient instructions.  Discussed use, benefit, and side effects of prescribed medications.  All patient questions answered. Pt voiced understanding. Reviewed health maintenance.  Instructed to continue current medications, diet and exercise.  Patient agreed with treatment plan. Follow up as directed.     Electronically signed by Paradise Estrella MD on 7/12/2024 at 3:02 PM

## 2024-07-13 LAB
MICROORGANISM SPEC CULT: NORMAL
SERVICE CMNT-IMP: NORMAL
SPECIMEN DESCRIPTION: NORMAL

## 2024-07-30 ENCOUNTER — HOSPITAL ENCOUNTER (OUTPATIENT)
Dept: PREADMISSION TESTING | Age: 89
Discharge: HOME | End: 2024-08-03
Payer: MEDICARE

## 2024-07-30 VITALS
OXYGEN SATURATION: 95 % | HEIGHT: 63 IN | TEMPERATURE: 97.9 F | SYSTOLIC BLOOD PRESSURE: 181 MMHG | DIASTOLIC BLOOD PRESSURE: 74 MMHG | HEART RATE: 83 BPM | BODY MASS INDEX: 28.88 KG/M2 | WEIGHT: 163 LBS | RESPIRATION RATE: 18 BRPM

## 2024-07-30 LAB
ABO + RH BLD: NORMAL
ANION GAP SERPL CALCULATED.3IONS-SCNC: 11 MMOL/L (ref 9–17)
ARM BAND NUMBER: NORMAL
BACTERIA URNS QL MICRO: ABNORMAL
BASOPHILS # BLD: 0 K/UL (ref 0–0.2)
BASOPHILS NFR BLD: 1 % (ref 0–2)
BILIRUB UR QL STRIP: NEGATIVE
BLOOD BANK SAMPLE EXPIRATION: NORMAL
BLOOD GROUP ANTIBODIES SERPL: NEGATIVE
BUN SERPL-MCNC: 17 MG/DL (ref 8–23)
CALCIUM SERPL-MCNC: 9.4 MG/DL (ref 8.6–10.4)
CASTS #/AREA URNS LPF: ABNORMAL /LPF
CHLORIDE SERPL-SCNC: 100 MMOL/L (ref 98–107)
CLARITY UR: CLEAR
CO2 SERPL-SCNC: 27 MMOL/L (ref 20–31)
COLOR UR: YELLOW
CREAT SERPL-MCNC: 0.9 MG/DL (ref 0.5–0.9)
EOSINOPHIL # BLD: 0.2 K/UL (ref 0–0.4)
EOSINOPHILS RELATIVE PERCENT: 2 % (ref 0–4)
EPI CELLS #/AREA URNS HPF: ABNORMAL /HPF
ERYTHROCYTE [DISTWIDTH] IN BLOOD BY AUTOMATED COUNT: 13.5 % (ref 11.5–14.9)
GFR, ESTIMATED: 61 ML/MIN/1.73M2
GLUCOSE SERPL-MCNC: 105 MG/DL (ref 70–99)
GLUCOSE UR STRIP-MCNC: NEGATIVE MG/DL
HCT VFR BLD AUTO: 38.3 % (ref 36–46)
HGB BLD-MCNC: 12.3 G/DL (ref 12–16)
HGB UR QL STRIP.AUTO: NEGATIVE
KETONES UR STRIP-MCNC: NEGATIVE MG/DL
LEUKOCYTE ESTERASE UR QL STRIP: ABNORMAL
LYMPHOCYTES NFR BLD: 1.4 K/UL (ref 1–4.8)
LYMPHOCYTES RELATIVE PERCENT: 20 % (ref 24–44)
MCH RBC QN AUTO: 28.8 PG (ref 26–34)
MCHC RBC AUTO-ENTMCNC: 32.1 G/DL (ref 31–37)
MCV RBC AUTO: 89.8 FL (ref 80–100)
MONOCYTES NFR BLD: 0.6 K/UL (ref 0.1–1.3)
MONOCYTES NFR BLD: 8 % (ref 1–7)
NEUTROPHILS NFR BLD: 69 % (ref 36–66)
NEUTS SEG NFR BLD: 5.1 K/UL (ref 1.3–9.1)
NITRITE UR QL STRIP: NEGATIVE
PH UR STRIP: 7 [PH] (ref 5–8)
PLATELET # BLD AUTO: 319 K/UL (ref 150–450)
PMV BLD AUTO: 7.3 FL (ref 6–12)
POTASSIUM SERPL-SCNC: 4.6 MMOL/L (ref 3.7–5.3)
PROT UR STRIP-MCNC: NEGATIVE MG/DL
RBC # BLD AUTO: 4.26 M/UL (ref 4–5.2)
RBC #/AREA URNS HPF: ABNORMAL /HPF
SODIUM SERPL-SCNC: 138 MMOL/L (ref 135–144)
SP GR UR STRIP: 1.01 (ref 1–1.03)
UROBILINOGEN UR STRIP-ACNC: NORMAL EU/DL (ref 0–1)
WBC #/AREA URNS HPF: ABNORMAL /HPF
WBC OTHER # BLD: 7.3 K/UL (ref 3.5–11)

## 2024-07-30 PROCEDURE — 93005 ELECTROCARDIOGRAM TRACING: CPT | Performed by: ANESTHESIOLOGY

## 2024-07-30 PROCEDURE — 80048 BASIC METABOLIC PNL TOTAL CA: CPT

## 2024-07-30 PROCEDURE — 86900 BLOOD TYPING SEROLOGIC ABO: CPT

## 2024-07-30 PROCEDURE — 87641 MR-STAPH DNA AMP PROBE: CPT

## 2024-07-30 PROCEDURE — 86850 RBC ANTIBODY SCREEN: CPT

## 2024-07-30 PROCEDURE — 85025 COMPLETE CBC W/AUTO DIFF WBC: CPT

## 2024-07-30 PROCEDURE — 86901 BLOOD TYPING SEROLOGIC RH(D): CPT

## 2024-07-30 PROCEDURE — 81001 URINALYSIS AUTO W/SCOPE: CPT

## 2024-07-30 PROCEDURE — 36415 COLL VENOUS BLD VENIPUNCTURE: CPT

## 2024-07-30 ASSESSMENT — PAIN SCALES - GENERAL: PAINLEVEL_OUTOF10: 5

## 2024-07-30 ASSESSMENT — PAIN DESCRIPTION - ORIENTATION: ORIENTATION: RIGHT

## 2024-07-30 ASSESSMENT — PAIN DESCRIPTION - PAIN TYPE: TYPE: ACUTE PAIN

## 2024-07-30 ASSESSMENT — PAIN DESCRIPTION - DESCRIPTORS: DESCRIPTORS: ACHING

## 2024-07-30 ASSESSMENT — PAIN DESCRIPTION - LOCATION: LOCATION: HIP

## 2024-07-30 NOTE — PROGRESS NOTES
Patient states that she believes that she had a spinal for her bilat knee replacements and was asking if that would be an option for her hip surgery on 8/13/24, informed her that Dr See and the anesthesiologist will speak to her prior to surgery and they will come up with a plan for her taking her concerns into consideration.    Baseline survey was completed with patient via patient [facility].  Patient has obtained medical and cardiac clearances and are included in surgery chart.

## 2024-07-30 NOTE — DISCHARGE INSTRUCTIONS
Pre-op Instructions For Out-Patient Surgery    Medication Instructions:  Please stop herbs and any supplements now (includes vitamins and minerals).    Please contact your surgeon and prescribing physician for pre-op instructions for any blood thinners. Aspirin as directed.    If you have inhalers/aerosol treatments at home, please use them the morning of your surgery and bring the inhalers with you to the hospital.    Please take the following medications the morning of your surgery with a sip of water:    omeprazole, midodrine if needed    Surgery Instructions:  After midnight before surgery:  Do not eat or drink anything, including water, mints, gum, and hard candy.  You may brush your teeth without swallowing.  No smoking, chewing tobacco, or street drugs.    Please shower or bathe before surgery.  If you were given Surgical Scrub Chlorhexidine Gluconate Liquid (CHG), please shower the night before and the morning of your surgery following the detailed instructions you received during your pre-admission visit.     Please do not wear any cologne, lotion, powder, deodorant, jewelry, piercings, perfume, makeup, nail polish, hair accessories, or hair spray on the day of surgery.  Wear loose comfortable clothing.    Leave your valuables at home but bring a payment source for any after-surgery prescriptions you plan to fill at [facility].  Bring a storage case for any glasses/contacts.    An adult who is responsible for you MUST drive you home and should be with you for the first 24 hours after surgery.     If having out-patient knee and foot surgeries, please arrange for planned crutches, walker, or wheelchair before arriving to the hospital.    The Day of Surgery:  Arrive at [facility] Surgery Entrance at the time directed by your surgeon and check in at the desk.     If you have a living will or healthcare power of , please bring a copy.    You will be

## 2024-07-31 LAB
EKG ATRIAL RATE: 79 BPM
EKG P AXIS: 51 DEGREES
EKG P-R INTERVAL: 174 MS
EKG Q-T INTERVAL: 418 MS
EKG QRS DURATION: 118 MS
EKG QTC CALCULATION (BAZETT): 479 MS
EKG R AXIS: 90 DEGREES
EKG T AXIS: 12 DEGREES
EKG VENTRICULAR RATE: 79 BPM
MRSA, DNA, NASAL: NEGATIVE
SPECIMEN DESCRIPTION: NORMAL

## 2024-08-12 ENCOUNTER — ANESTHESIA EVENT (OUTPATIENT)
Dept: OPERATING ROOM | Age: 89
End: 2024-08-12
Payer: MEDICARE

## 2024-08-12 NOTE — PRE-PROCEDURE INSTRUCTIONS
No answer, left message ?                             Unable to leave message ?    When were you told to arrive at hospital ?  0530    Do you have a  ?yes    Are you on any blood thinners ?      no               If yes when did you stop taking ?    Do you have your prep Rx filled and instruction ?      Nothing to eat the day before , only clear liquids.n/a    Are you experiencing any covid symptoms ? no    Do you have any infections or rash we should be aware of ?no      Do you have the Hibiclens soap to use the night before and the morning of surgery ?yes    Nothing to eat or drink after midnight, only a sip of water to take any medication instructed to take the night before.yes  Wear comfortable clothing, leave any valuables at home, remove any jewelry and body piercing . yes

## 2024-08-13 ENCOUNTER — HOSPITAL ENCOUNTER (INPATIENT)
Age: 89
LOS: 3 days | Discharge: NURSING, REHAB OR LONG-TERM CARE | DRG: 470 | End: 2024-08-18
Attending: ORTHOPAEDIC SURGERY | Admitting: ORTHOPAEDIC SURGERY
Payer: MEDICARE

## 2024-08-13 ENCOUNTER — ANESTHESIA (OUTPATIENT)
Dept: OPERATING ROOM | Age: 89
End: 2024-08-13
Payer: MEDICARE

## 2024-08-13 ENCOUNTER — APPOINTMENT (OUTPATIENT)
Dept: GENERAL RADIOLOGY | Age: 89
DRG: 470 | End: 2024-08-13
Attending: ORTHOPAEDIC SURGERY
Payer: MEDICARE

## 2024-08-13 DIAGNOSIS — R55 VASOVAGAL SYNCOPE: Primary | ICD-10-CM

## 2024-08-13 PROBLEM — M16.11 PRIMARY OSTEOARTHRITIS OF RIGHT HIP: Status: ACTIVE | Noted: 2024-08-13

## 2024-08-13 PROCEDURE — 7100000001 HC PACU RECOVERY - ADDTL 15 MIN: Performed by: ORTHOPAEDIC SURGERY

## 2024-08-13 PROCEDURE — 97166 OT EVAL MOD COMPLEX 45 MIN: CPT

## 2024-08-13 PROCEDURE — 6360000002 HC RX W HCPCS: Performed by: ORTHOPAEDIC SURGERY

## 2024-08-13 PROCEDURE — 6370000000 HC RX 637 (ALT 250 FOR IP): Performed by: ORTHOPAEDIC SURGERY

## 2024-08-13 PROCEDURE — 2709999900 HC NON-CHARGEABLE SUPPLY: Performed by: ORTHOPAEDIC SURGERY

## 2024-08-13 PROCEDURE — 2580000003 HC RX 258: Performed by: ORTHOPAEDIC SURGERY

## 2024-08-13 PROCEDURE — 3600000013 HC SURGERY LEVEL 3 ADDTL 15MIN: Performed by: ORTHOPAEDIC SURGERY

## 2024-08-13 PROCEDURE — 2580000003 HC RX 258: Performed by: ANESTHESIOLOGY

## 2024-08-13 PROCEDURE — 2500000003 HC RX 250 WO HCPCS: Performed by: NURSE ANESTHETIST, CERTIFIED REGISTERED

## 2024-08-13 PROCEDURE — 3600000003 HC SURGERY LEVEL 3 BASE: Performed by: ORTHOPAEDIC SURGERY

## 2024-08-13 PROCEDURE — 2500000003 HC RX 250 WO HCPCS: Performed by: ORTHOPAEDIC SURGERY

## 2024-08-13 PROCEDURE — 3700000001 HC ADD 15 MINUTES (ANESTHESIA): Performed by: ORTHOPAEDIC SURGERY

## 2024-08-13 PROCEDURE — C1776 JOINT DEVICE (IMPLANTABLE): HCPCS | Performed by: ORTHOPAEDIC SURGERY

## 2024-08-13 PROCEDURE — 27130 TOTAL HIP ARTHROPLASTY: CPT | Performed by: ORTHOPAEDIC SURGERY

## 2024-08-13 PROCEDURE — 3700000000 HC ANESTHESIA ATTENDED CARE: Performed by: ORTHOPAEDIC SURGERY

## 2024-08-13 PROCEDURE — 97530 THERAPEUTIC ACTIVITIES: CPT

## 2024-08-13 PROCEDURE — 99222 1ST HOSP IP/OBS MODERATE 55: CPT | Performed by: INTERNAL MEDICINE

## 2024-08-13 PROCEDURE — G0378 HOSPITAL OBSERVATION PER HR: HCPCS

## 2024-08-13 PROCEDURE — 2720000010 HC SURG SUPPLY STERILE: Performed by: ORTHOPAEDIC SURGERY

## 2024-08-13 PROCEDURE — G0378 HOSPITAL OBSERVATION PER HR: HCPCS | Performed by: INTERNAL MEDICINE

## 2024-08-13 PROCEDURE — 6360000002 HC RX W HCPCS: Performed by: NURSE ANESTHETIST, CERTIFIED REGISTERED

## 2024-08-13 PROCEDURE — 97162 PT EVAL MOD COMPLEX 30 MIN: CPT

## 2024-08-13 PROCEDURE — 7100000000 HC PACU RECOVERY - FIRST 15 MIN: Performed by: ORTHOPAEDIC SURGERY

## 2024-08-13 PROCEDURE — 6360000002 HC RX W HCPCS: Performed by: ANESTHESIOLOGY

## 2024-08-13 PROCEDURE — 0SR90JA REPLACEMENT OF RIGHT HIP JOINT WITH SYNTHETIC SUBSTITUTE, UNCEMENTED, OPEN APPROACH: ICD-10-PCS | Performed by: ORTHOPAEDIC SURGERY

## 2024-08-13 DEVICE — LINER ACET N CONSTRN E NEUT 36 MM HIP POLYETH LONGEVITY G7: Type: IMPLANTABLE DEVICE | Site: HIP | Status: FUNCTIONAL

## 2024-08-13 DEVICE — STEM FEM SZ 11 L142MM 133DEG DST HIP PPS HI OFFSET TYP 1: Type: IMPLANTABLE DEVICE | Site: HIP | Status: FUNCTIONAL

## 2024-08-13 DEVICE — G7 FINNED 3 HOLE SHELL 52E: Type: IMPLANTABLE DEVICE | Site: HIP | Status: FUNCTIONAL

## 2024-08-13 DEVICE — HEAD FEM DIA36MM -3MM OFFSET HIP CO CHROM TYP 1 TAPR MOD G7: Type: IMPLANTABLE DEVICE | Site: HIP | Status: FUNCTIONAL

## 2024-08-13 RX ORDER — FENTANYL CITRATE 0.05 MG/ML
25 INJECTION, SOLUTION INTRAMUSCULAR; INTRAVENOUS EVERY 5 MIN PRN
Status: DISCONTINUED | OUTPATIENT
Start: 2024-08-13 | End: 2024-08-13 | Stop reason: HOSPADM

## 2024-08-13 RX ORDER — SODIUM CHLORIDE 0.9 % (FLUSH) 0.9 %
5-40 SYRINGE (ML) INJECTION PRN
Status: DISCONTINUED | OUTPATIENT
Start: 2024-08-13 | End: 2024-08-13 | Stop reason: HOSPADM

## 2024-08-13 RX ORDER — SODIUM CHLORIDE, SODIUM LACTATE, POTASSIUM CHLORIDE, CALCIUM CHLORIDE 600; 310; 30; 20 MG/100ML; MG/100ML; MG/100ML; MG/100ML
INJECTION, SOLUTION INTRAVENOUS CONTINUOUS
Status: DISCONTINUED | OUTPATIENT
Start: 2024-08-13 | End: 2024-08-15

## 2024-08-13 RX ORDER — ONDANSETRON 2 MG/ML
4 INJECTION INTRAMUSCULAR; INTRAVENOUS
Status: DISCONTINUED | OUTPATIENT
Start: 2024-08-13 | End: 2024-08-13 | Stop reason: HOSPADM

## 2024-08-13 RX ORDER — ONDANSETRON 2 MG/ML
4 INJECTION INTRAMUSCULAR; INTRAVENOUS EVERY 6 HOURS PRN
Status: DISCONTINUED | OUTPATIENT
Start: 2024-08-13 | End: 2024-08-18 | Stop reason: HOSPADM

## 2024-08-13 RX ORDER — PROPOFOL 10 MG/ML
INJECTION, EMULSION INTRAVENOUS CONTINUOUS PRN
Status: DISCONTINUED | OUTPATIENT
Start: 2024-08-13 | End: 2024-08-13 | Stop reason: SDUPTHER

## 2024-08-13 RX ORDER — ASPIRIN 81 MG/1
81 TABLET ORAL 2 TIMES DAILY
Status: DISCONTINUED | OUTPATIENT
Start: 2024-08-13 | End: 2024-08-18 | Stop reason: HOSPADM

## 2024-08-13 RX ORDER — PANTOPRAZOLE SODIUM 40 MG/1
40 TABLET, DELAYED RELEASE ORAL
Status: DISCONTINUED | OUTPATIENT
Start: 2024-08-14 | End: 2024-08-18 | Stop reason: HOSPADM

## 2024-08-13 RX ORDER — OXYCODONE HYDROCHLORIDE 10 MG/1
10 TABLET ORAL EVERY 4 HOURS PRN
Status: DISCONTINUED | OUTPATIENT
Start: 2024-08-13 | End: 2024-08-18 | Stop reason: HOSPADM

## 2024-08-13 RX ORDER — SODIUM CHLORIDE 0.9 % (FLUSH) 0.9 %
5-40 SYRINGE (ML) INJECTION PRN
Status: DISCONTINUED | OUTPATIENT
Start: 2024-08-13 | End: 2024-08-18 | Stop reason: HOSPADM

## 2024-08-13 RX ORDER — SODIUM CHLORIDE 0.9 % (FLUSH) 0.9 %
5-40 SYRINGE (ML) INJECTION EVERY 12 HOURS SCHEDULED
Status: DISCONTINUED | OUTPATIENT
Start: 2024-08-13 | End: 2024-08-13 | Stop reason: HOSPADM

## 2024-08-13 RX ORDER — BUPIVACAINE HYDROCHLORIDE 7.5 MG/ML
INJECTION, SOLUTION INTRASPINAL
Status: COMPLETED | OUTPATIENT
Start: 2024-08-13 | End: 2024-08-13

## 2024-08-13 RX ORDER — SODIUM CHLORIDE 0.9 % (FLUSH) 0.9 %
5-40 SYRINGE (ML) INJECTION EVERY 12 HOURS SCHEDULED
Status: DISCONTINUED | OUTPATIENT
Start: 2024-08-13 | End: 2024-08-18 | Stop reason: HOSPADM

## 2024-08-13 RX ORDER — OXYCODONE HYDROCHLORIDE 5 MG/1
5 TABLET ORAL EVERY 4 HOURS PRN
Status: DISCONTINUED | OUTPATIENT
Start: 2024-08-13 | End: 2024-08-18 | Stop reason: HOSPADM

## 2024-08-13 RX ORDER — ONDANSETRON 2 MG/ML
INJECTION INTRAMUSCULAR; INTRAVENOUS PRN
Status: DISCONTINUED | OUTPATIENT
Start: 2024-08-13 | End: 2024-08-13 | Stop reason: SDUPTHER

## 2024-08-13 RX ORDER — SODIUM CHLORIDE 9 MG/ML
INJECTION, SOLUTION INTRAVENOUS PRN
Status: DISCONTINUED | OUTPATIENT
Start: 2024-08-13 | End: 2024-08-13 | Stop reason: HOSPADM

## 2024-08-13 RX ORDER — EPHEDRINE SULFATE/0.9% NACL/PF 25 MG/5 ML
SYRINGE (ML) INTRAVENOUS PRN
Status: DISCONTINUED | OUTPATIENT
Start: 2024-08-13 | End: 2024-08-13 | Stop reason: SDUPTHER

## 2024-08-13 RX ORDER — ACETAMINOPHEN 325 MG/1
650 TABLET ORAL EVERY 6 HOURS
Status: DISCONTINUED | OUTPATIENT
Start: 2024-08-13 | End: 2024-08-18 | Stop reason: HOSPADM

## 2024-08-13 RX ORDER — SODIUM CHLORIDE, SODIUM LACTATE, POTASSIUM CHLORIDE, CALCIUM CHLORIDE 600; 310; 30; 20 MG/100ML; MG/100ML; MG/100ML; MG/100ML
INJECTION, SOLUTION INTRAVENOUS CONTINUOUS
Status: DISCONTINUED | OUTPATIENT
Start: 2024-08-13 | End: 2024-08-13 | Stop reason: HOSPADM

## 2024-08-13 RX ORDER — DEXAMETHASONE SODIUM PHOSPHATE 10 MG/ML
10 INJECTION, SOLUTION INTRAMUSCULAR; INTRAVENOUS ONCE
Status: COMPLETED | OUTPATIENT
Start: 2024-08-13 | End: 2024-08-13

## 2024-08-13 RX ORDER — FENTANYL CITRATE 0.05 MG/ML
50 INJECTION, SOLUTION INTRAMUSCULAR; INTRAVENOUS EVERY 5 MIN PRN
Status: DISCONTINUED | OUTPATIENT
Start: 2024-08-13 | End: 2024-08-13 | Stop reason: HOSPADM

## 2024-08-13 RX ORDER — TRANEXAMIC ACID 100 MG/ML
INJECTION, SOLUTION INTRAVENOUS PRN
Status: DISCONTINUED | OUTPATIENT
Start: 2024-08-13 | End: 2024-08-13 | Stop reason: SDUPTHER

## 2024-08-13 RX ORDER — SODIUM CHLORIDE 9 MG/ML
INJECTION, SOLUTION INTRAVENOUS PRN
Status: DISCONTINUED | OUTPATIENT
Start: 2024-08-13 | End: 2024-08-18 | Stop reason: HOSPADM

## 2024-08-13 RX ORDER — MIDAZOLAM HYDROCHLORIDE 1 MG/ML
INJECTION INTRAMUSCULAR; INTRAVENOUS PRN
Status: DISCONTINUED | OUTPATIENT
Start: 2024-08-13 | End: 2024-08-13 | Stop reason: SDUPTHER

## 2024-08-13 RX ORDER — CALCIUM CHLORIDE 100 MG/ML
INJECTION INTRAVENOUS; INTRAVENTRICULAR PRN
Status: DISCONTINUED | OUTPATIENT
Start: 2024-08-13 | End: 2024-08-13 | Stop reason: ALTCHOICE

## 2024-08-13 RX ORDER — ACETAMINOPHEN 500 MG
1000 TABLET ORAL ONCE
Status: COMPLETED | OUTPATIENT
Start: 2024-08-13 | End: 2024-08-13

## 2024-08-13 RX ORDER — PHENYLEPHRINE HYDROCHLORIDE 10 MG/ML
INJECTION INTRAVENOUS PRN
Status: DISCONTINUED | OUTPATIENT
Start: 2024-08-13 | End: 2024-08-13 | Stop reason: SDUPTHER

## 2024-08-13 RX ORDER — DIPHENHYDRAMINE HYDROCHLORIDE 50 MG/ML
12.5 INJECTION INTRAMUSCULAR; INTRAVENOUS
Status: DISCONTINUED | OUTPATIENT
Start: 2024-08-13 | End: 2024-08-13 | Stop reason: HOSPADM

## 2024-08-13 RX ORDER — DOCUSATE SODIUM 100 MG/1
100 CAPSULE, LIQUID FILLED ORAL 2 TIMES DAILY PRN
Status: DISCONTINUED | OUTPATIENT
Start: 2024-08-13 | End: 2024-08-18 | Stop reason: HOSPADM

## 2024-08-13 RX ORDER — GABAPENTIN 600 MG/1
300 TABLET ORAL ONCE
Status: COMPLETED | OUTPATIENT
Start: 2024-08-13 | End: 2024-08-13

## 2024-08-13 RX ADMIN — TRANEXAMIC ACID 1000 MG: 100 INJECTION, SOLUTION INTRAVENOUS at 09:17

## 2024-08-13 RX ADMIN — TRANEXAMIC ACID 1000 MG: 100 INJECTION, SOLUTION INTRAVENOUS at 07:45

## 2024-08-13 RX ADMIN — MIDAZOLAM 1 MG: 1 INJECTION INTRAMUSCULAR; INTRAVENOUS at 07:33

## 2024-08-13 RX ADMIN — EPHEDRINE SULFATE 10 MG: 5 INJECTION INTRAVENOUS at 07:47

## 2024-08-13 RX ADMIN — SODIUM CHLORIDE, POTASSIUM CHLORIDE, SODIUM LACTATE AND CALCIUM CHLORIDE: 600; 310; 30; 20 INJECTION, SOLUTION INTRAVENOUS at 19:13

## 2024-08-13 RX ADMIN — ACETAMINOPHEN 1000 MG: 500 TABLET ORAL at 06:56

## 2024-08-13 RX ADMIN — EPHEDRINE SULFATE 5 MG: 5 INJECTION INTRAVENOUS at 08:08

## 2024-08-13 RX ADMIN — ASPIRIN 81 MG: 81 TABLET, COATED ORAL at 20:05

## 2024-08-13 RX ADMIN — ACETAMINOPHEN 650 MG: 325 TABLET ORAL at 12:14

## 2024-08-13 RX ADMIN — SODIUM CHLORIDE, POTASSIUM CHLORIDE, SODIUM LACTATE AND CALCIUM CHLORIDE: 600; 310; 30; 20 INJECTION, SOLUTION INTRAVENOUS at 07:09

## 2024-08-13 RX ADMIN — SODIUM CHLORIDE, POTASSIUM CHLORIDE, SODIUM LACTATE AND CALCIUM CHLORIDE: 600; 310; 30; 20 INJECTION, SOLUTION INTRAVENOUS at 11:15

## 2024-08-13 RX ADMIN — ONDANSETRON 4 MG: 2 INJECTION INTRAMUSCULAR; INTRAVENOUS at 09:23

## 2024-08-13 RX ADMIN — ACETAMINOPHEN 650 MG: 325 TABLET ORAL at 23:41

## 2024-08-13 RX ADMIN — PHENYLEPHRINE HYDROCHLORIDE 50 MCG: 10 INJECTION INTRAVENOUS at 09:34

## 2024-08-13 RX ADMIN — EPHEDRINE SULFATE 10 MG: 5 INJECTION INTRAVENOUS at 08:17

## 2024-08-13 RX ADMIN — GABAPENTIN 300 MG: 600 TABLET, FILM COATED ORAL at 06:56

## 2024-08-13 RX ADMIN — ACETAMINOPHEN 650 MG: 325 TABLET ORAL at 17:22

## 2024-08-13 RX ADMIN — PHENYLEPHRINE HYDROCHLORIDE 100 MCG: 10 INJECTION INTRAVENOUS at 09:02

## 2024-08-13 RX ADMIN — Medication 2000 MG: at 07:41

## 2024-08-13 RX ADMIN — SODIUM CHLORIDE, POTASSIUM CHLORIDE, SODIUM LACTATE AND CALCIUM CHLORIDE: 600; 310; 30; 20 INJECTION, SOLUTION INTRAVENOUS at 08:53

## 2024-08-13 RX ADMIN — BUPIVACAINE HYDROCHLORIDE IN DEXTROSE 12 MG: 7.5 INJECTION, SOLUTION SUBARACHNOID at 07:37

## 2024-08-13 RX ADMIN — OXYCODONE HYDROCHLORIDE 10 MG: 10 TABLET ORAL at 13:10

## 2024-08-13 RX ADMIN — PROPOFOL 50 MCG/KG/MIN: 10 INJECTION, EMULSION INTRAVENOUS at 07:52

## 2024-08-13 RX ADMIN — DEXAMETHASONE SODIUM PHOSPHATE 10 MG: 10 INJECTION, SOLUTION INTRAMUSCULAR; INTRAVENOUS at 07:07

## 2024-08-13 RX ADMIN — MIDAZOLAM 1 MG: 1 INJECTION INTRAMUSCULAR; INTRAVENOUS at 07:30

## 2024-08-13 RX ADMIN — Medication 2000 MG: at 15:09

## 2024-08-13 ASSESSMENT — PAIN DESCRIPTION - LOCATION
LOCATION: HIP
LOCATION: HIP

## 2024-08-13 ASSESSMENT — PAIN - FUNCTIONAL ASSESSMENT
PAIN_FUNCTIONAL_ASSESSMENT: 0-10
PAIN_FUNCTIONAL_ASSESSMENT: NONE - DENIES PAIN

## 2024-08-13 ASSESSMENT — PAIN SCALES - GENERAL
PAINLEVEL_OUTOF10: 4
PAINLEVEL_OUTOF10: 5
PAINLEVEL_OUTOF10: 0

## 2024-08-13 ASSESSMENT — PAIN DESCRIPTION - ORIENTATION
ORIENTATION: RIGHT
ORIENTATION: RIGHT

## 2024-08-13 ASSESSMENT — PAIN DESCRIPTION - DESCRIPTORS
DESCRIPTORS: ACHING
DESCRIPTORS: ACHING

## 2024-08-13 NOTE — ANESTHESIA POSTPROCEDURE EVALUATION
Department of Anesthesiology  Postprocedure Note    Patient: Aimna Woods  MRN: [id number]  YOB: 1935  Date of evaluation: 8/13/2024    Procedure Summary       Date: 08/13/24 Room / Location: [address]    Anesthesia Start: 0726 Anesthesia Stop: 0953    Procedure: HIP TOTAL ARTHROPLASTY ASI (Right: Hip) Diagnosis:       Primary osteoarthritis of right hip      (Primary osteoarthritis of right hip [M16.11])    Surgeons: Aristeo See MD Responsible Provider: Emili Gandhi MD    Anesthesia Type: Spinal ASA Status: 3            Anesthesia Type: Spinal    Melquiades Phase I: Melquiades Score: 9    Melquiades Phase II:      Anesthesia Post Evaluation    Comments: POST- ANESTHESIA EVALUATION       Pt Name: Amina Woods  MRN: [id number]  YOB: 1935  Date of evaluation: 8/13/2024  Time:  1:56 PM      /63   Pulse (!) 102   Temp 98.6 °F (37 °C)   Resp 16   Ht 1.6 m (5' 2.99\")   Wt 71.2 kg (157 lb)   SpO2 92%   BMI 27.82 kg/m²      Consciousness Level  Awake  Cardiopulmonary Status  Stable  Pain Adequately Treated YES  Nausea / Vomiting  NO  Adequate Hydration  YES  Anesthesia Related Complications NONE      Electronically signed by Emili Gandhi MD on 8/13/2024 at 1:56 PM           No notable events documented.

## 2024-08-13 NOTE — PROGRESS NOTES
Dr. Lopez notified of new patient consult for post op management.    Dr. STACEY Mcmahon notified of new patient consult to [facility] for rehab.

## 2024-08-13 NOTE — ANESTHESIA PRE PROCEDURE
MD       • ceFAZolin (ANCEF) 2000 mg in 0.9% sodium chloride 50 mL IVPB  2,000 mg IntraVENous On Call to OR Aristeo See MD       • acetaminophen (TYLENOL) tablet 1,000 mg  1,000 mg Oral Once Aristeo See MD       • dexAMETHasone (PF) (DECADRON) injection 10 mg  10 mg IntraVENous Once Aristeo See MD       • gabapentin (NEURONTIN) tablet 300 mg  300 mg Oral Once Aristeo See MD       • lactated ringers IV soln infusion   IntraVENous Continuous Marika Muniz MD       • sodium chloride flush 0.9 % injection 5-40 mL  5-40 mL IntraVENous 2 times per day Marika Muniz MD       • sodium chloride flush 0.9 % injection 5-40 mL  5-40 mL IntraVENous PRN Marika Muniz MD       • 0.9 % sodium chloride infusion   IntraVENous PRN Marika Muniz MD           Allergies:    Allergies   Allergen Reactions   • Latex Rash   • Adhesive Tape Other (See Comments)     Tears her skin   • Anesthetic [Benzocaine]      Difficulty waking   • Aspercreme Lidocaine [Lidocaine]    • Benzocaine-Benzethonium      Difficulty waking   • Pcn [Penicillins] Rash     Other reaction(s): Intolerance       Problem List:    Patient Active Problem List   Diagnosis Code   • Osteoarthritis M19.90   • Hyperlipidemia E78.5   • Mild hyperglycemia. R73.9   • Orthostatic hypotension I95.1   • Psoriasis L40.9   • Pre-cancerous skin lesions. C44.90   • Elevated blood pressure reading R03.0   • S/P total knee replacement Z96.659   • Disorder of lipid metabolism E78.9   • Anemia D64.9   • Dizziness R42   • Acid reflux K21.9   • Osteopenia M85.80   • Vasovagal syncope R55   • Vitamin D deficiency E55.9   • Drug-induced constipation K59.03   • Lumbar radiculopathy M54.16   • Osteoarthritis of right hip M16.11       Past Medical History:        Diagnosis Date   • Basal cell carcinoma     mulitple   • Blood in feces 09/19/2016   • Hyperlipidemia 08/30/2011   • Mild hyperglycemia. 08/30/2011   • Orthostatic hypotension 08/30/2011   • Osteoarthritis

## 2024-08-13 NOTE — CONSULTS
[facility] Internal Medicine  Mo Matson MD; Antonino Arias MD; Adrian Lopez MD; MD Massiel Briggs MD; Jared Horvath MD    [facility] Internal Medicine   IN-PATIENT SERVICE   [facility]    HISTORY AND PHYSICAL EXAMINATION            Date:   8/13/2024  Patient name:  Amina Woods  Date of admission:  8/13/2024  5:47 AM  MRN:   [id number]  Account:  [id number]  YOB: 1935  PCP:    Paradise Estrella MD  Room:   2042/2042-01  Code Status:    Full Code    Chief Complaint:     No chief complaint on file.  Osteoarthritis hip replacement    History Obtained From:     Patient medical record nursing staff and patient's family present in the room    History of Present Illness:     Amina Woods is a 89 y.o. Non- / non  female who presents with No chief complaint on file.   and is admitted to the hospital for the management of Primary osteoarthritis of right hip.    89-year-old lady with history of osteoarthritis hypertension hyperlipidemia suffers from osteoarthritis right hip admitted for elective right hip replacement status post surgery today pain control denies any dyspnea no chest pain no nausea vomiting no fever chills no constipation    Past Medical History:     Past Medical History:   Diagnosis Date    Basal cell carcinoma     mulitple    Blood in feces 09/19/2016    Hyperlipidemia 08/30/2011    Mild hyperglycemia. 08/30/2011    Orthostatic hypotension 08/30/2011    Osteoarthritis     knees, thumbs    Post-menopausal     Pre-cancerous skin lesions. 08/30/2011    Prolonged emergence from general anesthesia     Psoriasis 08/30/2011    Syncope, non cardiac     \"vascular syncope\" per patient - last episode was 2023        Past Surgical History:     Past Surgical History:   Procedure Laterality Date    APPENDECTOMY      during hyst    CATARACT REMOVAL WITH IMPLANT Bilateral     COLON SURGERY      fistula repair-

## 2024-08-13 NOTE — CARE COORDINATION
Case Management Assessment  Initial Evaluation    Date/Time of Evaluation: 8/13/2024 1:12 PM  Assessment Completed by: Elva Meza RN    If patient is discharged prior to next notation, then this note serves as note for discharge by case management.    Patient Name: Amina Woods                   YOB: 1935  Diagnosis: Primary osteoarthritis of right hip [M16.11]                   Date / Time: 8/13/2024  5:47 AM    Patient Admission Status: Observation   Readmission Risk (Low < 19, Mod (19-27), High > 27): No data recorded  Current PCP: Paradise Estrella MD  PCP verified by CM? Yes    Chart Reviewed: Yes      History Provided by: Patient  Patient Orientation: Alert and Oriented    Patient Cognition: Alert    Hospitalization in the last 30 days (Readmission):  No    If yes, Readmission Assessment in  Navigator will be completed.    Advance Directives:      Code Status: Full Code   Patient's Primary Decision Maker is: Legal Next of Kin    Primary Decision Maker: USE,DO NOT - Spouse - [phone]    Primary Decision Maker: ChuckKarolyn - Child    Discharge Planning:    Patient lives with: Alone Type of Home: House  Primary Care Giver: Self  Patient Support Systems include: Children   Current Financial resources: Medicare  Current community resources: None  Current services prior to admission: Durable Medical Equipment            Current DME: Cane, Walker, Shower Chair            Type of Home Care services:  None    ADLS  Prior functional level: Independent in ADLs/IADLs  Current functional level:      PT AM-PAC:   /24  OT AM-PAC:   /24    Family can provide assistance at DC: Yes  Would you like Case Management to discuss the discharge plan with any other family members/significant others, and if so, who? Yes (children)  Plans to Return to Present Housing: Unknown at present  Other Identified Issues/Barriers to RETURNING to current housing: weakness, lives alone  Potential Assistance

## 2024-08-13 NOTE — PROGRESS NOTES
Occupational Therapy  [facility]   Occupational Therapy Evaluation  Date: 24  Patient Name: Amina Woods       Room:   MRN: [id number]  Account: [id number]   : 1935  (89 y.o.) Gender: female     Discharge Recommendations:  Further Occupational Therapy is recommended upon facility discharge.    OT Equipment Recommendations  Other: TBD    Referring Practitioner: Aristeo See MD  Diagnosis: Primary osteoarthritis of right hip   Additional Pertinent Hx: Per H&P note; Amina Woods is a 89 y.o. Non- / non  female who presents with No chief complaint on file.   and is admitted to the hospital for the management of Primary osteoarthritis of right hip.     89-year-old lady with history of osteoarthritis hypertension hyperlipidemia suffers from osteoarthritis right hip admitted for elective right hip replacement status post surgery today pain control denies any dyspnea no chest pain no nausea vomiting no fever chills no constipation    Treatment Diagnosis: Impaired self care status    Past Medical History:  has a past medical history of Basal cell carcinoma, Blood in feces, Hyperlipidemia, Mild hyperglycemia., Orthostatic hypotension, Osteoarthritis, Post-menopausal, Pre-cancerous skin lesions., Prolonged emergence from general anesthesia, Psoriasis, and Syncope, non cardiac.    Past Surgical History:   has a past surgical history that includes Hysterectomy; Colon surgery; Colonoscopy (, ); joint replacement (); Foot surgery; Appendectomy; Skin cancer excision; Skin cancer destruction; sigmoidoscopy (); Cataract removal with implant (Bilateral); and Total hip arthroplasty (Right, 2024).    Restrictions  Restrictions/Precautions  Restrictions/Precautions: Weight Bearing, General Precautions  Required Braces or Orthoses?: Yes (Pt wears certain shoes when ambulating)  Implants present? : Metal implants (B TKA, R JORJE)  Lower

## 2024-08-13 NOTE — H&P
HISTORY and PHYSICAL  [facility]       NAME:  Amina Woods  MRN: [id number]   YOB: 1935   Date: 8/13/2024   Age: 89 y.o.  Gender: female       COMPLAINT AND PRESENT HISTORY:     Amina Woods is 89 y.o.,female, undergoing for : Pre-Op Diagnosis Codes:      * Primary osteoarthritis of right hip     Patient will be having: Procedure(s):  HIP TOTAL ARTHROPLASTY ASI-RIGHT    Follow up office note per Dr. See   on 7/10/24, italicized below  Patient returns today she is here for to discuss her right hip. Please refer to all previous dictations. Patient has had IR injection of this in the past and only helped out for a month and a half maybe 2 months she is at the point where she is in extreme pain she cannot sleep she feels that she is good to fall all the time and has significant problems and having a poor quality of life. She is here to discuss right total of arthroplasty and is here with her daughter     UPDATE:     Patient complains of pain, stiffness in the right     Hip with limitation in the ROM.  Reports that the  Hip does lock up.     Symptoms started 1 year ago and has progressively worsened since onset.      Pt describes and rates the pain as sharp   5/10 in intensity at times.         Pt reports that  up and down motion or even lying in bed aggravates the pain.    Patient denies any extreme joint warmth, redness to affected hip in relationship to an infection.      Patient has tried Tylenol arthritis interventions to help with pain.  Patient has tried physical therapy and  IR injection x1 in the past with poor outcomes. And short lived.    Patient is using an assistive device, quad cane. . Pt denies any  recent falls or trauma.   Pt denies any  bowel or bladder issues. No N,V, D.     SIGNIFICANT MEDICAL HISTORY reviewed.   Pt denies any  chest pain/pressure.  Pt reports no  SOB. No recent URI.    Pt denies any hx of blood clots  Anticoagulants, antiplatelets  or

## 2024-08-13 NOTE — PLAN OF CARE
Problem: Pain  Goal: Verbalizes/displays adequate comfort level or baseline comfort level  Outcome: Progressing, Pt educated on non-pharmacological pain interventions. PRN pain medications administered.        Problem: Safety - Adult  Goal: Free from fall injury  Outcome: Progressing, Patient remains free of incidence/ injury. Bed remains in low position. Side rails up x2.

## 2024-08-13 NOTE — ANESTHESIA PROCEDURE NOTES
Spinal Block    Patient location during procedure: OR  End time: 8/13/2024 7:42 AM  Reason for block: primary anesthetic  Staffing  Performed: resident/CRNA   Resident/CRNA: Loy Olvera APRN - CRNA  Performed by: Loy Olvera APRN - CRNA  Authorized by: Emili Gandhi MD    Spinal Block  Patient position: sitting  Prep: Betadine  Patient monitoring: cardiac monitor, continuous pulse ox, continuous capnometry, frequent blood pressure checks and oxygen  Approach: midline  Location: L2/L3  Provider prep: mask and sterile gloves  Needle  Needle type: Quincke   Needle gauge: 22 G  Needle length: 3.5 in  Assessment  Sensory level: T6  Swirl obtained: Yes  CSF: clear  Attempts: 1  Hemodynamics: stable  Preanesthetic Checklist  Completed: patient identified, IV checked, site marked, risks and benefits discussed, surgical/procedural consents, equipment checked, pre-op evaluation, timeout performed, anesthesia consent given, oxygen available and monitors applied/VS acknowledged

## 2024-08-13 NOTE — OP NOTE
Operative Note      Patient: Amina Woods  YOB: 1935  MRN: [id number]    Date of Procedure: 8/13/2024    Pre-Op Diagnosis Codes:      * Primary osteoarthritis of right hip [M16.11]    Post-Op Diagnosis: Same       Procedure(s):  HIP TOTAL ARTHROPLASTY ASI    Surgeon(s):  Aristeo See MD    Assistant:   Resident: Shauna Talley DO Joe Bielecki, CST    Anesthesia: Spinal    Estimated Blood Loss (mL): 400    Complications: None    Specimens:   * No specimens in log *    Implants:  Implant Name Type Inv. Item Serial No.  Lot No. LRB No. Used Action   G7 FINNED 3 HOLE SHELL 52E - [id number]  G7 FINNED 3 HOLE SHELL 52E  [employer] [facility] [id number] Right 1 Implanted   LINER ACET N CONSTRN E NEUT 36 MM HIP POLYETH LONGEVITY G7 - [id number]  LINER ACET N CONSTRN E NEUT 36 MM HIP POLYETH LONGEVITY G7  [facility] [employer] [facility] [id number] Right 1 Implanted   STEM FEM SZ 11 L142MM 133DEG DST HIP PPS HI OFFSET TYP 1 - [id number]  STEM FEM SZ 11 L142MM 133DEG DST HIP PPS HI OFFSET TYP 1  [employer] [facility] [id number] Right 1 Implanted   HEAD FEM [id number] -3MM OFFSET HIP CO CHROM TYP 1 TAPR MOD G7 - [id number]  HEAD FEM [id number] -3MM OFFSET HIP CO CHROM TYP 1 TAPR MOD G7  [employer] [facility] [id number] Right 1 Implanted         Drains: * No LDAs found *    Findings:  Infection Present At Time Of Surgery (PATOS) (choose all levels that have infection present):  No infection present  Other Findings: Severe DJD right hip    Detailed Description of Procedure:     Patient is a 89 y.o. 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 The patient has failed all types of conservative treatments including physical therapy, NSAIDs, weight loss counseling, and intra-articular steroid injection. The patient's activities of daily living have become significantly restricted. Having failed conservative treatment, the patient has agreed to proceed  with total hip  Fixation was verified. The above mentioned liner was inserted, impacted, and fixation verified.     The proximal femur was exposed by performing a posterior capsular release, dropping the foot off the table  and placement of the leg in figure 4 with proper placement of the # 8 and #5 retractors. The femoral canal was then assessed with a cookie cutter, canal finder, and lateral sizer. Broaching with rasps was carried out in approximately 10-15 degrees of anteversion. This was performed in the increasing sizes until appropriate fit, fill, and rotational stability was obtained. Various offset trunions and head/neck lengths  were trailed until appropriate soft-tissue tensioning, offset, and leg-length was re-established. The trial component were inspected for sizing and positioning with fluoroscopy. Once satisfied, the trial components were removed and the permanent femoral stem was inserted and impacted until no further movement. Re-trailing was carried out, the above sized head/neck combination was placed on the clean-and-dried mouse taper and the hip reduced. Repeat fluoroscopy was carried out. ROM and stability and leg lengths were re-evaluated.     The entire hip area was injected with 100ml of the orthopedic cocktail. A Irrisept lavage was carried out for 1 minutes. This was then re-irrigated. The soft tissues were sprayed with platelet rich and platelet poor plasma. Hemostasis was excellent. The fascia was closed with a #1 Strata-Fix, the subcutaneous tissue was closed with a 2-0 Monocryl Strata-Fix, and Zopline for the skin. This was covered with Aquacel dressing.     The patient was awakened and taken to PACU in good condition.       Aristeo See MD         Electronically signed by Aristeo See MD on 8/13/2024 at 9:25 AM

## 2024-08-13 NOTE — PROGRESS NOTES
to [employer] for the ease of daily activities  Patient Goals   Patient Goals : To go to [facility] for rehab       Education  Patient Education  Education Given To: Patient;Family  Education Provided: Role of Therapy;Plan of Care  Education Method: Demonstration;Verbal  Barriers to Learning: None  Education Outcome: Continued education needed      Therapy Time   Individual Concurrent Group Co-treatment   Time In 0108         Time Out 0153         Minutes 45         Timed Code Treatment Minutes: 25 Minutes     The above documentation completed by Student Physical Therapist was provided under the direct line of sight by supervision. Documentation was reviewed and accepted by supervising Clinical Instructor Mireya Pation PT.     Rabia Kaur, SPT

## 2024-08-14 ENCOUNTER — APPOINTMENT (OUTPATIENT)
Age: 89
DRG: 470 | End: 2024-08-14
Attending: INTERNAL MEDICINE
Payer: MEDICARE

## 2024-08-14 LAB
ANION GAP SERPL CALCULATED.3IONS-SCNC: 14 MMOL/L (ref 9–17)
BUN SERPL-MCNC: 17 MG/DL (ref 8–23)
CALCIUM SERPL-MCNC: 8.3 MG/DL (ref 8.6–10.4)
CHLORIDE SERPL-SCNC: 99 MMOL/L (ref 98–107)
CO2 SERPL-SCNC: 21 MMOL/L (ref 20–31)
CREAT SERPL-MCNC: 0.9 MG/DL (ref 0.5–0.9)
D DIMER PPP FEU-MCNC: 0.29 UG/ML FEU (ref 0–0.59)
ECHO AO ROOT DIAM: 2.8 CM
ECHO AO ROOT INDEX: 1.61 CM/M2
ECHO AV AREA PEAK VELOCITY: 0.9 CM2
ECHO AV AREA VTI: 0.8 CM2
ECHO AV AREA/BSA PEAK VELOCITY: 0.5 CM2/M2
ECHO AV AREA/BSA VTI: 0.5 CM2/M2
ECHO AV MEAN GRADIENT: 7 MMHG
ECHO AV MEAN VELOCITY: 1.2 M/S
ECHO AV PEAK GRADIENT: 10 MMHG
ECHO AV PEAK VELOCITY: 1.6 M/S
ECHO AV VELOCITY RATIO: 0.56
ECHO AV VTI: 38 CM
ECHO BSA: 1.78 M2
ECHO EST RA PRESSURE: 3 MMHG
ECHO LA AREA 2C: 21.3 CM2
ECHO LA AREA 4C: 18.6 CM2
ECHO LA DIAMETER INDEX: 1.55 CM/M2
ECHO LA DIAMETER: 2.7 CM
ECHO LA MAJOR AXIS: 6 CM
ECHO LA MINOR AXIS: 5.3 CM
ECHO LA TO AORTIC ROOT RATIO: 0.96
ECHO LA VOL BP: 60 ML (ref 22–52)
ECHO LA VOL MOD A2C: 69 ML (ref 22–52)
ECHO LA VOL MOD A4C: 47 ML (ref 22–52)
ECHO LA VOL/BSA BIPLANE: 34 ML/M2 (ref 16–34)
ECHO LA VOLUME INDEX MOD A2C: 40 ML/M2 (ref 16–34)
ECHO LA VOLUME INDEX MOD A4C: 27 ML/M2 (ref 16–34)
ECHO LV E' LATERAL VELOCITY: 6 CM/S
ECHO LV E' SEPTAL VELOCITY: 6 CM/S
ECHO LV FRACTIONAL SHORTENING: 36 % (ref 28–44)
ECHO LV INTERNAL DIMENSION DIASTOLE INDEX: 1.9 CM/M2
ECHO LV INTERNAL DIMENSION DIASTOLIC: 3.3 CM (ref 3.9–5.3)
ECHO LV INTERNAL DIMENSION SYSTOLIC INDEX: 1.21 CM/M2
ECHO LV INTERNAL DIMENSION SYSTOLIC: 2.1 CM
ECHO LV IVSD: 1.1 CM (ref 0.6–0.9)
ECHO LV MASS 2D: 109.1 G (ref 67–162)
ECHO LV MASS INDEX 2D: 62.7 G/M2 (ref 43–95)
ECHO LV POSTERIOR WALL DIASTOLIC: 1.1 CM (ref 0.6–0.9)
ECHO LV RELATIVE WALL THICKNESS RATIO: 0.67
ECHO LVOT AREA: 1.5 CM2
ECHO LVOT AV VTI INDEX: 0.5
ECHO LVOT DIAM: 1.4 CM
ECHO LVOT MEAN GRADIENT: 2 MMHG
ECHO LVOT PEAK GRADIENT: 3 MMHG
ECHO LVOT PEAK VELOCITY: 0.9 M/S
ECHO LVOT STROKE VOLUME INDEX: 16.8 ML/M2
ECHO LVOT SV: 29.2 ML
ECHO LVOT VTI: 19 CM
ECHO MV A VELOCITY: 1.45 M/S
ECHO MV AREA VTI: 0.8 CM2
ECHO MV E DECELERATION TIME (DT): 169 MS
ECHO MV E VELOCITY: 1.14 M/S
ECHO MV E/A RATIO: 0.79
ECHO MV E/E' LATERAL: 19
ECHO MV E/E' RATIO (AVERAGED): 19
ECHO MV E/E' SEPTAL: 19
ECHO MV LVOT VTI INDEX: 1.93
ECHO MV MAX VELOCITY: 1.6 M/S
ECHO MV MEAN GRADIENT: 2 MMHG
ECHO MV MEAN VELOCITY: 0.7 M/S
ECHO MV PEAK GRADIENT: 11 MMHG
ECHO MV VTI: 36.7 CM
ECHO RA AREA 4C: 11.5 CM2
ECHO RA END SYSTOLIC VOLUME APICAL 4 CHAMBER INDEX BSA: 12 ML/M2
ECHO RA VOLUME: 21 ML
ECHO RIGHT VENTRICULAR SYSTOLIC PRESSURE (RVSP): 9 MMHG
ECHO RV TAPSE: 2.2 CM (ref 1.7–?)
ECHO TV REGURGITANT MAX VELOCITY: 1.18 M/S
ECHO TV REGURGITANT PEAK GRADIENT: 6 MMHG
ERYTHROCYTE [DISTWIDTH] IN BLOOD BY AUTOMATED COUNT: 13.3 % (ref 11.5–14.9)
GFR, ESTIMATED: 61 ML/MIN/1.73M2
GLUCOSE BLD-MCNC: 100 MG/DL (ref 65–105)
GLUCOSE SERPL-MCNC: 106 MG/DL (ref 70–99)
HCT VFR BLD AUTO: 26.5 % (ref 36–46)
HCT VFR BLD AUTO: 26.8 % (ref 36–46)
HGB BLD-MCNC: 8.9 G/DL (ref 12–16)
HGB BLD-MCNC: 8.9 G/DL (ref 12–16)
MCH RBC QN AUTO: 30.6 PG (ref 26–34)
MCHC RBC AUTO-ENTMCNC: 33.7 G/DL (ref 31–37)
MCV RBC AUTO: 90.7 FL (ref 80–100)
PLATELET # BLD AUTO: 235 K/UL (ref 150–450)
PMV BLD AUTO: 7.2 FL (ref 6–12)
POTASSIUM SERPL-SCNC: 4.1 MMOL/L (ref 3.7–5.3)
RBC # BLD AUTO: 2.92 M/UL (ref 4–5.2)
SODIUM SERPL-SCNC: 134 MMOL/L (ref 135–144)
TSH SERPL DL<=0.05 MIU/L-ACNC: 0.56 UIU/ML (ref 0.3–5)
WBC OTHER # BLD: 9.8 K/UL (ref 3.5–11)

## 2024-08-14 PROCEDURE — 93306 TTE W/DOPPLER COMPLETE: CPT

## 2024-08-14 PROCEDURE — 93306 TTE W/DOPPLER COMPLETE: CPT | Performed by: INTERNAL MEDICINE

## 2024-08-14 PROCEDURE — 99233 SBSQ HOSP IP/OBS HIGH 50: CPT | Performed by: INTERNAL MEDICINE

## 2024-08-14 PROCEDURE — 97110 THERAPEUTIC EXERCISES: CPT

## 2024-08-14 PROCEDURE — 6370000000 HC RX 637 (ALT 250 FOR IP): Performed by: ORTHOPAEDIC SURGERY

## 2024-08-14 PROCEDURE — 84443 ASSAY THYROID STIM HORMONE: CPT

## 2024-08-14 PROCEDURE — 96360 HYDRATION IV INFUSION INIT: CPT

## 2024-08-14 PROCEDURE — 85014 HEMATOCRIT: CPT

## 2024-08-14 PROCEDURE — G0378 HOSPITAL OBSERVATION PER HR: HCPCS

## 2024-08-14 PROCEDURE — 99222 1ST HOSP IP/OBS MODERATE 55: CPT | Performed by: STUDENT IN AN ORGANIZED HEALTH CARE EDUCATION/TRAINING PROGRAM

## 2024-08-14 PROCEDURE — 85018 HEMOGLOBIN: CPT

## 2024-08-14 PROCEDURE — 96361 HYDRATE IV INFUSION ADD-ON: CPT

## 2024-08-14 PROCEDURE — 85027 COMPLETE CBC AUTOMATED: CPT

## 2024-08-14 PROCEDURE — 97530 THERAPEUTIC ACTIVITIES: CPT

## 2024-08-14 PROCEDURE — 2580000003 HC RX 258: Performed by: ORTHOPAEDIC SURGERY

## 2024-08-14 PROCEDURE — 97116 GAIT TRAINING THERAPY: CPT

## 2024-08-14 PROCEDURE — 94761 N-INVAS EAR/PLS OXIMETRY MLT: CPT

## 2024-08-14 PROCEDURE — 82533 TOTAL CORTISOL: CPT

## 2024-08-14 PROCEDURE — 85379 FIBRIN DEGRADATION QUANT: CPT

## 2024-08-14 PROCEDURE — 36415 COLL VENOUS BLD VENIPUNCTURE: CPT

## 2024-08-14 PROCEDURE — 82947 ASSAY GLUCOSE BLOOD QUANT: CPT

## 2024-08-14 PROCEDURE — 6370000000 HC RX 637 (ALT 250 FOR IP): Performed by: INTERNAL MEDICINE

## 2024-08-14 PROCEDURE — 97535 SELF CARE MNGMENT TRAINING: CPT

## 2024-08-14 PROCEDURE — 99024 POSTOP FOLLOW-UP VISIT: CPT | Performed by: ORTHOPAEDIC SURGERY

## 2024-08-14 PROCEDURE — 80048 BASIC METABOLIC PNL TOTAL CA: CPT

## 2024-08-14 RX ORDER — KETOROLAC TROMETHAMINE 30 MG/ML
15 INJECTION, SOLUTION INTRAMUSCULAR; INTRAVENOUS EVERY 6 HOURS PRN
Status: DISCONTINUED | OUTPATIENT
Start: 2024-08-14 | End: 2024-08-18 | Stop reason: HOSPADM

## 2024-08-14 RX ADMIN — ACETAMINOPHEN 650 MG: 325 TABLET ORAL at 11:18

## 2024-08-14 RX ADMIN — PANTOPRAZOLE SODIUM 40 MG: 40 TABLET, DELAYED RELEASE ORAL at 06:28

## 2024-08-14 RX ADMIN — ASPIRIN 81 MG: 81 TABLET, COATED ORAL at 21:59

## 2024-08-14 RX ADMIN — ASPIRIN 81 MG: 81 TABLET, COATED ORAL at 07:38

## 2024-08-14 RX ADMIN — SODIUM CHLORIDE, PRESERVATIVE FREE 10 ML: 5 INJECTION INTRAVENOUS at 21:59

## 2024-08-14 RX ADMIN — ACETAMINOPHEN 650 MG: 325 TABLET ORAL at 16:57

## 2024-08-14 RX ADMIN — SODIUM CHLORIDE, POTASSIUM CHLORIDE, SODIUM LACTATE AND CALCIUM CHLORIDE: 600; 310; 30; 20 INJECTION, SOLUTION INTRAVENOUS at 10:21

## 2024-08-14 RX ADMIN — ACETAMINOPHEN 650 MG: 325 TABLET ORAL at 06:28

## 2024-08-14 ASSESSMENT — PAIN SCALES - GENERAL: PAINLEVEL_OUTOF10: 0

## 2024-08-14 NOTE — PROGRESS NOTES
Spiritual Health Assessment/Progress Note  [facility]    (P) Crisis, (P) Rapid Response, Code Stroke, Code Blue,  ,      Name: Amina Woods MRN: [id number]    Age: 89 y.o.     Sex: female   Language: English   Restoration: Worship   Primary osteoarthritis of right hip     Date: 8/14/2024                           Spiritual Assessment continued in [facility] ICU        Referral/Consult From: (P) Multi-disciplinary team   Encounter Overview/Reason: (P) Crisis  Service Provided For: (P) Patient    Room 2042  Rapid Response: 9:38 am  Stroke Alert: 9:42 am  Code Blue Alert: 9:44 am    Patient responsive to direct requests by nursing staff.    No family present.  Nursing working to contact family.  No family contact information in chart.    Room ICU10.  Patient responsive to direct requests by nursing staff.        Rosa M, Belief, Meaning:   Patient unable to communicate at this time  Family/Friends not present      Importance and Influence:  Patient unable to communicate at this time  Family/Friends not present    Community:  Patient unable to communicate at this time  Family/Friends not present    Assessment and Plan of Care:   Patient Interventions include: Spiritual Care available upon further referral  Family/Friends Interventions include: Spiritual Care available upon further referral    Patient Plan of Care: Spiritual Care available upon further referral  Family/Friends Plan of Care: Spiritual Care available upon further referral    Electronically signed by Chaplain Chivo on 8/14/2024 at 10:30 AM       08/14/24 1018   Encounter Summary   Encounter Overview/Reason Crisis   Service Provided For Patient   Referral/Consult From Multi-disciplinary team   Last Encounter  08/14/24   Crisis   Type Rapid Response;Code Stroke;Code Blue   Assessment/Intervention/Outcome   Assessment Unable to assess   Intervention Prayer (assurance of)/Kn[place]   Outcome Other (comment)  (followed pt move to ICU, no

## 2024-08-14 NOTE — PROGRESS NOTES
Mobility  Functional - Mobility Device: Rolling Walker  Activity:  (Forward steps)  Assist Level: Dependent/Total (Mod Ax2)  Functional Mobility Comments: Pt Mod Ax2 with RW. Pt with difficulties progressing R foot. Pt completes steps forwards. Pt unable to progress backwards.           Patient Education  Patient Education  Education Given To: Patient  Education Provided: Role of Therapy, Plan of Care, ADL Adaptive Strategies, Mobility Training, Fall Prevention Strategies, Transfer Training (home safety/fall prevention)  Education Provided Comments: Education provided on role of therapy and goal of session  Education Method: Verbal  Barriers to Learning: None  Education Outcome: Verbalized understanding, Demonstrated understanding, Continued education needed    Goals  Short Term Goals  Time Frame for Short Term Goals: By discharge pt will:  Short Term Goal 1: complete UB ADLs with Supervision and AE as needed to improve independence with self care tasks.  Short Term Goal 2: complete LB ADLs with CGA and AE as needed to improve independence with self care tasks.  Short Term Goal 3: engage in 15+ minutes of therapeutic activities/exercises to improve strength/endurance required for ADL engagement.  Short Term Goal 4: complete functional mobility/transfers with CGA, LRD, and Good safety to improve engagement in ADLs.  Short Term Goal 5: tolerate standing for 5+ minutes with CGA and LRD during functional activities to improve standing tolerance for ADL engagement.  Additional Goals?: Yes  Short Term Goal 6: demonstrate Good understanding of fall prevention strategies during functional activities to improve safe engagement in occupations.  Occupational Therapy Plan  Times Per Week: 5-7 (1-2x/day)  Current Treatment Recommendations: Self-Care / ADL, Balance training, Functional mobility training, Endurance training, Pain management, Safety education & training, Patient/Caregiver education & training, Equipment evaluation,  education, & procurement, Positioning, Home management training    Assessment  Activity Tolerance  Activity Tolerance: Patient limited by fatigue, Patient limited by pain  Assessment  Performance deficits / Impairments: Decreased ADL status, Decreased functional mobility , Decreased endurance, Decreased balance, Decreased high-level IADLs, Decreased posture  Treatment Diagnosis: Impaired self care status  Prognosis: Good  Decision Making: Medium Complexity  Discharge Recommendations: Patient would benefit from continued therapy after discharge  OT Equipment Recommendations  Other: TBD  Safety Devices  Type of Devices: Left in bed (pt transferred to ICU.)    AM-PAC Daily Activities Inpatient  AM-PAC Daily Activity - Inpatient   How much help is needed for putting on and taking off regular lower body clothing?: A Lot  How much help is needed for bathing (which includes washing, rinsing, drying)?: A Lot  How much help is needed for toileting (which includes using toilet, bedpan, or urinal)?: A Lot  How much help is needed for putting on and taking off regular upper body clothing?: A Little  How much help is needed for taking care of personal grooming?: A Little  How much help for eating meals?: A Little  AM-[facility] Inpatient Daily Activity Raw Score: 15  AM-PAC Inpatient ADL T-Scale Score : 34.69  ADL Inpatient CMS 0-100% Score: 56.46  ADL Inpatient CMS G-Code Modifier : CK    OT Minutes  OT Individual Minutes  Time In: 0914  Time Out: 0940  Minutes: 26      Electronically signed by HALLEY Chun on 8/14/24 at 10:43 AM EDT

## 2024-08-14 NOTE — CARE COORDINATION
DISCHARGE PLANNING NOTE:    LSW following for discharge placement.  Patient is being reviewed by [place] inpatient rehab. PM&R requested, awaiting determination. Referral sent to [facility] ARU. Will need insurance authorization once disposition plan is in place

## 2024-08-14 NOTE — PROGRESS NOTES
Post Operative Progress Note    8/14/2024 1:27 PM  Info:   Admit Date: 8/13/2024  PCP: Paradise Estrella MD      Medications:   Scheduled Meds:   sodium chloride flush  5-40 mL IntraVENous 2 times per day    acetaminophen  650 mg Oral Q6H    aspirin  81 mg Oral BID    pantoprazole  40 mg Oral QAM AC     Continuous Infusions:   lactated ringers IV soln 125 mL/hr at 08/14/24 1021    sodium chloride       PRN Meds:sodium chloride flush, sodium chloride, oxyCODONE **OR** oxyCODONE, HYDROmorphone, ondansetron, docusate sodium    Diet:   Diet: ADULT DIET; Regular    Subjective:   Patient is alert and without pain. Had syncopal episode this am, code ordered as well as stroke alert. All negative and responded on own and pressures stabilized.  No complaints now  Minimal pain in right hip    Objective:     Patient Vitals for the past 24 hrs:   BP Temp Temp src Pulse Resp SpO2   08/14/24 1230 (!) 129/51 -- -- (!) 101 25 99 %   08/14/24 1200 (!) 118/54 98.3 °F (36.8 °C) Oral 99 22 99 %   08/14/24 1130 -- -- -- 99 20 99 %   08/14/24 1100 (!) 126/46 -- -- 100 23 99 %   08/14/24 1030 (!) 140/52 -- -- 97 18 98 %   08/14/24 1022 135/85 98.4 °F (36.9 °C) Oral 93 16 98 %   08/14/24 1000 -- -- -- 91 18 97 %   08/14/24 0957 (!) 164/57 -- -- 91 19 98 %   08/14/24 0947 -- -- -- -- -- 96 %   08/14/24 0946 (!) 110/54 -- -- 84 -- --   08/14/24 0945 -- -- -- 64 -- --   08/14/24 0944 -- -- -- 71 -- 93 %   08/14/24 0944 (!) 88/45 -- -- -- -- --   08/14/24 0943 (!) 53/35 -- -- 50 12 97 %   08/14/24 0914 122/68 -- -- -- -- 97 %   08/14/24 0830 (!) 97/45 -- -- 97 -- --   08/14/24 0804 (!) 89/40 97.5 °F (36.4 °C) -- 100 16 93 %   08/14/24 0354 (!) 118/43 97.7 °F (36.5 °C) -- 85 16 96 %   08/13/24 2350 (!) 121/58 97.5 °F (36.4 °C) -- 84 15 98 %   08/13/24 2009 (!) 102/56 98 °F (36.7 °C) Oral 88 15 96 %   08/13/24 1506 (!) 105/50 97.3 °F (36.3 °C) -- 94 17 93 %         Wound: incision clean and dry without sign of infection   Motion: expected

## 2024-08-14 NOTE — CODE DOCUMENTATION
Rapid response called and converted to stroke alert as patient was reported to have slurred speech and not looking to the right. Patient connected to defibrillator, house supervisor RN, Dr. Guzmán at bedside as well as Dr. Lopez. Patient became hypotensive and bradycardic as well as non responsive. B/P and HR as well as orientation level returned to baseline. After patient stabilized, stroke alert cancelled and patient transferred to intermediate ICU.

## 2024-08-14 NOTE — CONSULTS
(8/14/2023)    Exercise Vital Sign     Days of Exercise per Week: 0 days     Minutes of Exercise per Session: 0 min   Stress: Not on file   Social Connections: Moderately Integrated (8/14/2024)    Social Connections ([facility] HRSN)     If for any reason you need help with day-to-day activities such as bathing, preparing meals, shopping, managing finances, etc., do you get the help you need?: Not on file   Intimate Partner Violence: Not on file   Housing Stability: Low Risk  (8/13/2024)    Housing Stability Vital Sign     Unable to Pay for Housing in the Last Year: No     Number of Times Moved in the Last Year: 1     Homeless in the Last Year: No       Family History:   Family History   Problem Relation Age of Onset    Breast Cancer Mother     Heart Disease Father         angina    Cancer Sister         melanoma    Diabetes Sister     Diabetes Sister     Cancer Brother         kidney    Cancer Brother         colon    Diabetes Maternal Grandmother     Prostate Cancer Maternal Grandfather     Cancer Maternal Aunt         melanoma    Cancer Other         ovarian       Review of Systems  Gen: Denies any fevers chills  HEENT: Denies any runny nose or sore throat  CV: Denies any chest pain or palpitation  Lungs: Denies any cough or shortness of breath no pleuritic pain  Abd: No abdominal pain, no nausea or diarrhea  Extr: No calf pain, positive for right hip pain  Skin: No new rashes or lesion  Neuro: No dizziness or headaches currently    Physical Exam  Vital Signs: Blood pressure (!) 129/51, pulse (!) 101, temperature 98.3 °F (36.8 °C), temperature source Oral, resp. rate 25, height 1.6 m (5' 2.99\"), weight 71.2 kg (157 lb), SpO2 99%, not currently breastfeeding.    Admission Weight: Weight - Scale: 71.2 kg (157 lb)    General: Awake, alert, sitting up in bed, eating lunch, visiting with family, not in any distress, significantly improved since post code evaluation  Ears: Hearing is intact  Nose: Nares are patent, nasal  a history of syncopal episodes in the past, echocardiogram has been ordered  Monitor H/H, her hemoglobin is down from pre op, transfuse for hgb <7  May need further workup regarding syncope, defer to cardiology  Continue IVF  Routine labs  DVT prophylaxis per ortho  Monitor in ICU overnight  Discussed with daughter at bedside  Discussed with RN  Updated and reviewed case in detail with Dr. Moreira, who agrees with current plan of care    Thank you very much for the consult, we will continue following with you  CCT>30 min          Dinorah GILES [facility] Critical Care [facility] Sleep    Electronically signed by TISHA Milner - CNP on 08/14/24

## 2024-08-14 NOTE — ED NOTES
Called to the floor for overhead CODE BLUE.  Patient had an episode of bradycardia with hypotension and syncope.  This was after ambulating to the bathroom.  Hospitalist was already rounding with the patient Dr. Lopez.  He was in the room when I responded.  I responded immediately to the CODE BLUE.  Patient is now awake alert GCS 15, following commands.  No hemiparesis.  No facial droop.  No dysphagia.  She had a syncopal event.  She had an episode of bradycardia that looks like it resolved.  She is in sinus rhythm now in the 70s.  Blood pressure is 110/50.  There is no evidence of arrhythmia at this time.  Blood glucose within the normal range.  She ate breakfast.  Unable to palpate radial pulse left wrist.  Heart sound regular rhythm and rate.  Lungs clear to auscultation.  She is protecting her airway and phonating.  The CODE BLUE is ended, the patient is stable, she is transferred to stepdown unit.  Care is transitioned back to hospitalist service Dr. Lopez.  I spoke with him in the hallway.  Patient does not need to be intubated, there is no need for ACLS protocol at this time since she is stable with stable vitals and stable heart rhythm.  She does not have a pacemaker.     Michael Guzmán MD  08/14/24 1008    Correction to note above, I was able to palpate radial pulse left wrist.       Michale Guzmán MD  08/15/24 3092

## 2024-08-14 NOTE — PROGRESS NOTES
Physical therapy working w/ pt when she went unresponsive. Staff into room to assess. Pt in chair and not responding to sternal rub. RRT called. Stroke alert then called d/t slurred speech impaired gaze, LKW 0935. Code blue called d/t bradycardia and SBP 50s. Pt moved from chair to bed where defibrillator was applied. Pt came to, and returned to baseline orientation. /50, HR 70s. Pt transferred to intermediate ICU 2010 w/ belongings.  CHAO to call family.

## 2024-08-14 NOTE — DISCHARGE INSTRUCTIONS
DISCHARGE INSTRUCTIONS  Caring for yourself after joint replacement surgery (Total Hip and Total Knee Replacement)    Activity and Therapy  Receive physical therapy three times per week. (Pain medication one hour prior to therapy)   Perform PT exercises on own when not receiving home or outpatient PT.  Ideally exercises should be at least two times a day.  Increase level of activity and ambulation each day.  Perform deep breathing exercises daily.  Patient provides self-care when possible.  Work on Range of motion for Total knee patients.   No pillow under the knee for Total knee patients.  Elevate the surgical leg when seated.  No driving until cleared by surgeon      Diet:  Increase oral intake of fruits, fiber and water to prevent constipation.  Drink fluids frequently and take stool softeners to aid in bowel motility.  Increase protein intake/reduce high-sugar intake to help promote healing and prevent infection.    Incision Care:  Keep Primaseal or other dressing intact and remove as directed by surgeon, unless saturated, in which case, call surgeon and request instructions.  If dressing falls off, call surgeon.  Avery Hose on in the am and off in the pm to reduce swelling.  Ice affected area four times a day, for twenty minutes.    Pain Medications and Anticoagulant  You have been place on an anticoagulant to prevent blood clots.  Take this medication exactly as prescribed.  Be alert for signs of bleeding.  Take care not to injure yourself.  You have been provided pain medicine to control your pain.  Do not take more narcotics than prescribed.  You may begin weaning from narcotics as your pain level improves by decreasing the amount or frequency of the narcotics.  You may also take plain acetaminophen as an alternate to the narcotics.   Never exceed the recommended dosage.   Ice, rest and elevating the surgical limb also help with pain control.      When to call the Surgeon:  Increased redness, warmth, drainage,

## 2024-08-14 NOTE — PROGRESS NOTES
Spiritual Health Assessment/Progress Note  [facility]    Spiritual/Emotional Needs, Rapid Response, Code Stroke, Code Blue,  ,      Name: Amina Woods MRN: [id number]    Age: 89 y.o.     Sex: female   Language: English   Alevism: Sabianist   Primary osteoarthritis of right hip     Date: 8/14/2024       Writer made a follow up visit after Code Blue. A medical staff stated that the Code Blue was not bad, PT fainted for a short of time but came back normal. PT was laughing about what happened to her, and her niece, lFor, gave PT's son's and her contact number which writer updated the contact info. Welcomed prayer.                      Spiritual Assessment continued in [facility] ICU        Referral/Consult From: Rounding   Encounter Overview/Reason: Spiritual/Emotional Needs  Service Provided For: Patient and family together    Rosa M, Belief, Meaning:   Patient unable to communicate at this time  Family/Friends Other: NA      Importance and Influence:  Patient unable to communicate at this time  Family/Friends Other: NA    Community:  Patient Other: NA  Family/Friends Other: NA    Assessment and Plan of Care:     Patient Interventions include: Facilitated expression of thoughts and feelings, Explored spiritual coping/struggle/distress and theological reflection, and Affirmed coping skills/support systems  Family/Friends Interventions include: Family/Friends Interventions    Patient Plan of Care: Spiritual Care available upon further referral  Family/Friends Plan of Care: Spiritual Care available upon further referral    Electronically signed by Chaplain Bela       on 8/14/2024 at 12:10 PM

## 2024-08-14 NOTE — PROGRESS NOTES
Patient's family at bedside stating patient sees Dr. Hyman outpatient and is requesting for his consult. Dr. Hyman and Dr. Phillips notified.

## 2024-08-14 NOTE — DISCHARGE INSTR - COC
Continuity of Care Form    Patient Name: Amina Woods   :  1935  MRN:  [id number]    Admit date:  2024  Discharge date:  ***    Code Status Order: Full Code   Advance Directives:   Advance Care Flowsheet Documentation        Date/Time Healthcare Directive Type of Healthcare Directive Copy in Chart Healthcare Agent Appointed Healthcare Agent's Name Healthcare Agent's Phone Number    [phone] Yes, patient has an advance directive for healthcare treatment  Durable power of  for health care;Living will  No, copy requested from family  --  --  --                     Admitting Physician:  Aristeo See MD  PCP: Paradise Estrella MD    Discharging Nurse: ***  Discharging Hospital Unit/Room#:   Discharging Unit Phone Number: ***    Emergency Contact:   Extended Emergency Contact Information  Primary Emergency Contact: USE,DO NOT  Address: [address] [place] [address]  Home Phone: [phone]  Relation: Spouse  Secondary Emergency Contact: Karolyn Woods  Mobile Phone: [phone]  Relation: Child    Past Surgical History:  Past Surgical History:   Procedure Laterality Date    APPENDECTOMY      during hyst    CATARACT REMOVAL WITH IMPLANT Bilateral     COLON SURGERY      fistula repair- pt not sure    COLONOSCOPY  ,     FOOT SURGERY      [place]    HYSTERECTOMY (CERVIX STATUS UNKNOWN)      complete, d/t fibroids    JOINT REPLACEMENT      Right and left knees    SIGMOIDOSCOPY  2016    SKIN CANCER DESTRUCTION      basal cell    SKIN CANCER EXCISION      basal cell    TOTAL HIP ARTHROPLASTY Right 2024    HIP TOTAL ARTHROPLASTY ASI performed by Aristeo See MD at [facility] OR       Immunization History:   Immunization History   Administered Date(s) Administered    COVID-19, PFIZER Bivalent, DO NOT Dilute, (age 12y+), IM, 30 mcg/0.3 mL 2022    COVID-19, PFIZER PURPLE top, DILUTE for use, (age 12 y+), 30mcg/0.3mL  REPLACEMENT PATIENT SKILLED NURSING FACILITY PROTOCOL    This patient is a post-operative total joint replacement patient.                                 Expectations for this patient’s care are as follows:    Goals:  Increased level of activity and ambulation each day.  Well-controlled pain.  Free from infection.  Encourage patient to provide self-care when possible.  DISCHARGE TO HOME IN 7 DAYS, OR SOONER, IF GOALS ARE MET.    Activity & Diet:  Therapy to be performed twice daily. (Medicate patient 1 hr. prior to therapy.)  Up to chair for all meals.  Range of motion for TKA patients.  Hip precautions for JORJE patients.  Incentive Spirometer: 3-4 inhalations in a row, every twenty to thirty minutes, during the day, while awake.  Increase oral intake of fruits, fiber and water to prevent constipation.  Consider bowel protocol.  Increase protein intake/reduce high-sugar intake to help promote healing and prevent infection.  No pillow under the knee for TKA patients.    Avery Hose on in the am and off in the pm.    Incision Care:  If using VEL dressing or Aquacel dressing - keep dressing intact until seen and removed by surgeon, unless saturated, in which case, call surgeon and request instructions.  If dressing falls off, call surgeon.    Aquacel dressing is waterproof and patient may shower as of POD #1.    Prevena dressing is water-resistant and patient may also shower POD #1 with dressing in place, must place battery pack in a waterproof bag during shower.   Ice affected area four times a day, for twenty minutes.    Normal Conditions-These will improve with time and available comfort meaures:  Swelling in the operative leg is normal for a few weeks - this should reduce over time.  Use mobility, ice and elevation to improve.  Some post-operative pain is normal!  Use pain medications, ice & repositioning.    Constipation related to the use of narcotic pain medications and decreased mobility is a common occurrence -

## 2024-08-14 NOTE — PROGRESS NOTES
Patient brought over from med surg via bed. On monitor and assessment completed. Belongings placed in room.

## 2024-08-14 NOTE — CONSULTS
[facility] Internal Medicine  Mo Matson MD; Antonino Arias MD; Adrian Lopez MD; MD Massiel Briggs MD; Jared Horvath MD    [facility] Internal Medicine   IN-PATIENT SERVICE   [facility]    HISTORY AND PHYSICAL EXAMINATION            Date:   8/14/2024  Patient name:  Amina Woods  Date of admission:  8/13/2024  5:47 AM  MRN:   [id number]  Account:  [id number]  YOB: 1935  PCP:    Paradise Estrella MD  Room:   2042/2042-01  Code Status:    Full Code    Chief Complaint:     No chief complaint on file.  Osteoarthritis hip replacement    History Obtained From:     Patient medical record nursing staff and patient's family present in the room    History of Present Illness:     Amina Woods is a 89 y.o. Non- / non  female who presents with No chief complaint on file.   and is admitted to the hospital for the management of Primary osteoarthritis of right hip.    89-year-old lady with history of osteoarthritis hypertension hyperlipidemia suffers from osteoarthritis right hip admitted for elective right hip replacement status post surgery today pain control denies any dyspnea no chest pain no nausea vomiting no fever chills no constipation    Past Medical History:     Past Medical History:   Diagnosis Date    Basal cell carcinoma     mulitple    Blood in feces 09/19/2016    Hyperlipidemia 08/30/2011    Mild hyperglycemia. 08/30/2011    Orthostatic hypotension 08/30/2011    Osteoarthritis     knees, thumbs    Post-menopausal     Pre-cancerous skin lesions. 08/30/2011    Prolonged emergence from general anesthesia     Psoriasis 08/30/2011    Syncope, non cardiac     \"vascular syncope\" per patient - last episode was 2023        Past Surgical History:     Past Surgical History:   Procedure Laterality Date    APPENDECTOMY      during hyst    CATARACT REMOVAL WITH IMPLANT Bilateral     COLON SURGERY      fistula repair-  morning I attended for syncope  On evaluation patient was bradycardic less than 60 blood pressure 80/40 pupils were 4 mm and reactive no focal deficit  No seizure-like activity  Patient heart rate dropped and 30s blood pressure remained low was transferred to bed with the feet elevated   CODE BLUE was called   no CPR was started patient's breathing was also shallow  Blood sugar check was 100  Saturation remained over 94% pulses are palpable  Patient regained consciousness and started talking within few minutes heart rate improved to 84  Plan is to transfer to intermediate ICU for telemetry cardiology consult rule out sick sinus syndrome  Patient is also at risk for DVT will do a DVT scan  Twelve-lead EKG  Emergency physician Dr. Guzmán at bedside  Stroke alert cancelled ,no focal def  Syncope due to sever jazzmine  Pt not on BB  TSH ordered  Cortisol stat      Consultations:   IP CONSULT TO SOCIAL WORK  IP CONSULT TO INTERNAL MEDICINE  IP CONSULT TO PHYSICAL MEDICINE REHAB  IP CONSULT TO CARDIOLOGY     Patient is admitted as inpatient status because of co-morbidities listed above, severity of signs and symptoms as outlined, requirement for current medical therapies and most importantly because of direct risk to patient if care not provided in a hospital setting.  Expected length of stay > 48 hours.    Adrian Lopez MD  8/14/2024  9:52 AM    Copy sent to Dr. Estrella, Paradise Pineda MD    Please note that this chart was generated using voice recognition Dragon dictation software.  Although every effort was made to ensure the accuracy of this automated transcription, some errors in transcription may have occurred.

## 2024-08-14 NOTE — ANESTHESIA POSTPROCEDURE EVALUATION
Department of Anesthesiology  Postprocedure Note    Patient: Amina Woods  MRN: [id number]  YOB: 1935  Date of evaluation: 8/14/2024    Procedure Summary       Date: 08/13/24 Room / Location: [address]    Anesthesia Start: 0726 Anesthesia Stop: 0953    Procedure: HIP TOTAL ARTHROPLASTY ASI (Right: Hip) Diagnosis:       Primary osteoarthritis of right hip      (Primary osteoarthritis of right hip [M16.11])    Surgeons: Aristeo See MD Responsible Provider: Emili Gandhi MD    Anesthesia Type: Spinal ASA Status: 3            Anesthesia Type: Spinal    Melquiades Phase I: Melquiades Score: 9    Melquiades Phase II:      Anesthesia Post Evaluation    Comments: POD #1.  Patient seen at bedside.  No anesthesia complications reported post - op.  Pain well controlled on Tylenol today.  Did have a syncopal episode this morning after working with physical therapy.  Luckily did occur while seated.  Patient reports history of syncope in the past, and is on midodrine for orthostatic hypotension.  Do recommend limiting and reducing dose of narcotics.        No notable events documented.

## 2024-08-14 NOTE — SIGNIFICANT EVENT
This RN responded to stroke alert. Upon entering the room, the patient was apneic and being moved to bed from chair. Code blue called, crash cart moved into room, quick combo pads applied to patient. Once in bed, pt began breathing spontaneously. Pt did not lose a pulse at any time. LKW 0935 today. Pt now responding and answering questions. No focal neurological deficits identified. Dr. Lopez at bedside and agrees with canceling stroke alert. Pt to transfer to ICU.    DISPLAY PLAN FREE TEXT

## 2024-08-14 NOTE — PROGRESS NOTES
Physical Therapy  Facility/Department: [facility] ICU  Daily Treatment Note  NAME: Amina Woods  : 1935  MRN: [id number]    Date of Service: 2024    Discharge Recommendations:  Continue to assess pending progress, Patient would benefit from continued therapy after discharge   PT Equipment Recommendations  Equipment Needed:  (TBD)    Assessment   Activity Tolerance: Patient limited by pain;Patient limited by endurance  Equipment Needed:  (TBD)     Plan    Physical Therapy Plan  General Plan:  (2x per day)     Restrictions  Restrictions/Precautions  Restrictions/Precautions: Weight Bearing, General Precautions  Required Braces or Orthoses?: Yes (Pt wears certain shoes when ambulating)  Implants present? : Metal implants (B TKA, R JORJE)  Lower Extremity Weight Bearing Restrictions  Right Lower Extremity Weight Bearing: Weight Bearing As Tolerated  Position Activity Restriction  Hip Precautions: Anterior hip precautions  Other position/activity restrictions: No straight leg raises     Subjective    Subjective  Subjective: Pt positioned in semifowlers, pleasant and agreeable to tx. JOSEPHINE carmichael's tx and informing writer to monitor BP. Sarath from OT presenting prior to initiating transfer training.    Pain: 5/10 hip pain  Orientation  Overall Orientation Status: Within Functional Limits  Orientation Level: Oriented X4  Cognition  Overall Cognitive Status: Exceptions  Arousal/Alertness: Appears intact  Following Commands: Follows multistep commands with repitition;Follows one step commands with repetition  Attention Span: Attends with cues to redirect  Memory: Appears intact  Safety Judgement: Decreased awareness of need for safety;Decreased awareness of need for assistance  Problem Solving: Assistance required to implement solutions;Assistance required to generate solutions  Insights: Decreased awareness of deficits  Initiation: Requires cues for some  Sequencing: Requires cues for some     Objective   Vitals  AM:

## 2024-08-15 ENCOUNTER — APPOINTMENT (OUTPATIENT)
Dept: GENERAL RADIOLOGY | Age: 89
DRG: 470 | End: 2024-08-15
Attending: ORTHOPAEDIC SURGERY
Payer: MEDICARE

## 2024-08-15 LAB
ANION GAP SERPL CALCULATED.3IONS-SCNC: 11 MMOL/L (ref 9–17)
BASOPHILS # BLD: 0 K/UL (ref 0–0.2)
BASOPHILS NFR BLD: 0 % (ref 0–2)
BNP SERPL-MCNC: 1798 PG/ML
BUN SERPL-MCNC: 10 MG/DL (ref 8–23)
CALCIUM SERPL-MCNC: 8.5 MG/DL (ref 8.6–10.4)
CHLORIDE SERPL-SCNC: 100 MMOL/L (ref 98–107)
CO2 SERPL-SCNC: 24 MMOL/L (ref 20–31)
CREAT SERPL-MCNC: 0.6 MG/DL (ref 0.5–0.9)
EOSINOPHIL # BLD: 0 K/UL (ref 0–0.4)
EOSINOPHILS RELATIVE PERCENT: 0 % (ref 0–4)
ERYTHROCYTE [DISTWIDTH] IN BLOOD BY AUTOMATED COUNT: 13.2 % (ref 11.5–14.9)
GFR, ESTIMATED: 86 ML/MIN/1.73M2
GLUCOSE SERPL-MCNC: 122 MG/DL (ref 70–99)
HCT VFR BLD AUTO: 25.4 % (ref 36–46)
HGB BLD-MCNC: 8.6 G/DL (ref 12–16)
LYMPHOCYTES NFR BLD: 0.9 K/UL (ref 1–4.8)
LYMPHOCYTES RELATIVE PERCENT: 10 % (ref 24–44)
MAGNESIUM SERPL-MCNC: 1.7 MG/DL (ref 1.6–2.6)
MCH RBC QN AUTO: 29.7 PG (ref 26–34)
MCHC RBC AUTO-ENTMCNC: 33.9 G/DL (ref 31–37)
MCV RBC AUTO: 87.8 FL (ref 80–100)
MONOCYTES NFR BLD: 0.8 K/UL (ref 0.1–1.3)
MONOCYTES NFR BLD: 9 % (ref 1–7)
NEUTROPHILS NFR BLD: 81 % (ref 36–66)
NEUTS SEG NFR BLD: 7.2 K/UL (ref 1.3–9.1)
PLATELET # BLD AUTO: 199 K/UL (ref 150–450)
PMV BLD AUTO: 6.7 FL (ref 6–12)
POTASSIUM SERPL-SCNC: 4 MMOL/L (ref 3.7–5.3)
RBC # BLD AUTO: 2.89 M/UL (ref 4–5.2)
SODIUM SERPL-SCNC: 135 MMOL/L (ref 135–144)
WBC OTHER # BLD: 9 K/UL (ref 3.5–11)

## 2024-08-15 PROCEDURE — 94761 N-INVAS EAR/PLS OXIMETRY MLT: CPT

## 2024-08-15 PROCEDURE — 6370000000 HC RX 637 (ALT 250 FOR IP): Performed by: ORTHOPAEDIC SURGERY

## 2024-08-15 PROCEDURE — 96361 HYDRATE IV INFUSION ADD-ON: CPT

## 2024-08-15 PROCEDURE — 97110 THERAPEUTIC EXERCISES: CPT

## 2024-08-15 PROCEDURE — 1200000000 HC SEMI PRIVATE

## 2024-08-15 PROCEDURE — 80048 BASIC METABOLIC PNL TOTAL CA: CPT

## 2024-08-15 PROCEDURE — 97535 SELF CARE MNGMENT TRAINING: CPT

## 2024-08-15 PROCEDURE — 93005 ELECTROCARDIOGRAM TRACING: CPT | Performed by: INTERNAL MEDICINE

## 2024-08-15 PROCEDURE — 97530 THERAPEUTIC ACTIVITIES: CPT

## 2024-08-15 PROCEDURE — 99233 SBSQ HOSP IP/OBS HIGH 50: CPT | Performed by: INTERNAL MEDICINE

## 2024-08-15 PROCEDURE — 71045 X-RAY EXAM CHEST 1 VIEW: CPT

## 2024-08-15 PROCEDURE — 6370000000 HC RX 637 (ALT 250 FOR IP): Performed by: INTERNAL MEDICINE

## 2024-08-15 PROCEDURE — 97164 PT RE-EVAL EST PLAN CARE: CPT

## 2024-08-15 PROCEDURE — 2580000003 HC RX 258: Performed by: ORTHOPAEDIC SURGERY

## 2024-08-15 PROCEDURE — 83880 ASSAY OF NATRIURETIC PEPTIDE: CPT

## 2024-08-15 PROCEDURE — 83735 ASSAY OF MAGNESIUM: CPT

## 2024-08-15 PROCEDURE — 85025 COMPLETE CBC W/AUTO DIFF WBC: CPT

## 2024-08-15 PROCEDURE — 36415 COLL VENOUS BLD VENIPUNCTURE: CPT

## 2024-08-15 PROCEDURE — 99024 POSTOP FOLLOW-UP VISIT: CPT | Performed by: ORTHOPAEDIC SURGERY

## 2024-08-15 RX ORDER — LOSARTAN POTASSIUM 25 MG/1
25 TABLET ORAL DAILY
Status: DISCONTINUED | OUTPATIENT
Start: 2024-08-15 | End: 2024-08-15

## 2024-08-15 RX ADMIN — ASPIRIN 81 MG: 81 TABLET, COATED ORAL at 19:56

## 2024-08-15 RX ADMIN — SODIUM CHLORIDE, PRESERVATIVE FREE 10 ML: 5 INJECTION INTRAVENOUS at 19:56

## 2024-08-15 RX ADMIN — SODIUM CHLORIDE, POTASSIUM CHLORIDE, SODIUM LACTATE AND CALCIUM CHLORIDE: 600; 310; 30; 20 INJECTION, SOLUTION INTRAVENOUS at 05:00

## 2024-08-15 RX ADMIN — PANTOPRAZOLE SODIUM 40 MG: 40 TABLET, DELAYED RELEASE ORAL at 05:43

## 2024-08-15 RX ADMIN — ACETAMINOPHEN 650 MG: 325 TABLET ORAL at 11:44

## 2024-08-15 RX ADMIN — ACETAMINOPHEN 650 MG: 325 TABLET ORAL at 05:42

## 2024-08-15 RX ADMIN — ASPIRIN 81 MG: 81 TABLET, COATED ORAL at 07:44

## 2024-08-15 RX ADMIN — ACETAMINOPHEN 650 MG: 325 TABLET ORAL at 00:27

## 2024-08-15 RX ADMIN — ACETAMINOPHEN 650 MG: 325 TABLET ORAL at 16:11

## 2024-08-15 RX ADMIN — SODIUM CHLORIDE, PRESERVATIVE FREE 10 ML: 5 INJECTION INTRAVENOUS at 07:45

## 2024-08-15 ASSESSMENT — PAIN SCALES - GENERAL
PAINLEVEL_OUTOF10: 4
PAINLEVEL_OUTOF10: 0
PAINLEVEL_OUTOF10: 5
PAINLEVEL_OUTOF10: 0

## 2024-08-15 ASSESSMENT — PAIN DESCRIPTION - LOCATION
LOCATION: HIP
LOCATION: HIP

## 2024-08-15 ASSESSMENT — PAIN DESCRIPTION - ORIENTATION
ORIENTATION: RIGHT
ORIENTATION: RIGHT

## 2024-08-15 ASSESSMENT — PAIN DESCRIPTION - DESCRIPTORS: DESCRIPTORS: ACHING;DISCOMFORT

## 2024-08-15 NOTE — PROGRESS NOTES
Dr. Hyman rounding at bedside. Would like 12 lead EKG done before transfer to PCU. Order placed.    EKG done, OK with transfer.

## 2024-08-15 NOTE — CONSULTS
Date:   8/15/2024  Patient name: Amina Woods  Date of admission:  8/13/2024  5:47 AM  MRN:   [id number]  YOB: 1935  PCP: Paradise Estrella MD    Reason for Admission: Primary osteoarthritis of right hip [M16.11]    Cardiology consult:       Referring physician: Dr Stefania Borwn    Impression    Status post syncopal episode/transient bradycardia/hypotension  Post syncope no neurological deficit no arrhythmias noted  Right hip total arthroplasty 8/13/2024  History of fainting episodes since childhood  No history of exertional angina  No history of coronary intervention  Normal LV systolic function ejection fraction 60% no obvious valvular abnormality  Conduction disorder, right bundle branch  History of postural hypotension has been taking midodrine 2.5 mg as needed  History of hyperlipidemia  Psoriasis  History of previous syncopal episode/neurocardiogenic syncope  Moderate hiatal hernia    Past surgical history bilateral knee replacement, hysterectomy    Investigation workup    ECG 8/15/2024  Sinus rhythm, right bundle branch block, no diagnostic change from 7/30/2024    Chest x-ray 8/15/2024  Low lung volumes no acute cardiopulmonary process  Moderate hiatal hernia    ECG 7/30/2024  Normal sinus rhythm heart rate 79, low voltage, right bundle branch block, heart rate 90    2D echo 8/14/2024  Ejection fraction 60 to 65% mild LVH, normal wall motion normal LV size  Normal RV size and function  No significant valvular abnormality  Normal IVC diameter and respiratory variation  No pericardial effusion    Lexiscan  Myoview stress test March 14, 2014   no ischemia no infarct    History of present illness    89-year-old female who lives alone, independent in ADL, her daughter lives next door  She underwent right total hip arthroplasty on 8/13/2024.  On 8/14/2024 at about 10 AM KYREE BAEZ was called.  She had episode of bradycardia with hypotension and syncope.  He this was after ambulating to the    General appearance: alert and cooperative with exam  HEENT: Head: Normal, normocephalic, atraumatic.  Neck: no JVD and supple, symmetrical, trachea midline  Lungs:  Fine basal crackles  Heart: regular rate and rhythm  Abdomen:  Soft bowel sounds present  Extremities:  Right thigh edema  Neurologic: Mental status: Alert, oriented, thought content appropriate    EKG: Sinus rhythm with right bundle branch.  ECHO: reviewed.   Ejection fraction: 60%, mild LVH no valvular abnormality normal IVC dimension  Stress Test: reviewed.  Cardiac Angiography: not obtained.        Assessment / Acute Cardiac Problems:   8/14/2024 at about 10 AM syncopal episode while resting on chair after coming back from bathroom, transient bradycardia, hypotension  No seizure activity no neurodeficit  Heart rate blood pressure is stabilized within short time  No chest pain  No ischemic ECG changes  Right hip replacement 8/13/2024  Previous history of postural hypotension on midodrine 2.5 mg p.o. as needed  No history of coronary intervention  Conduction disorder, right bundle branch block    Patient Active Problem List:     Osteoarthritis     Hyperlipidemia     Mild hyperglycemia.     Orthostatic hypotension     Psoriasis     Pre-cancerous skin lesions.     Elevated blood pressure reading     S/P total knee replacement     Disorder of lipid metabolism     Anemia     Dizziness     Acid reflux     Osteopenia     Vasovagal syncope     Vitamin D deficiency     Drug-induced constipation     Lumbar radiculopathy     Osteoarthritis of right hip     Primary osteoarthritis of right hip      Plan of Treatment:   Medications reviewed  1: Status post transient bradycardia, hypotension, syncopal episode continue ECG monitor  2: History of postural hypotension hold losartan  3: Continue current dose of aspirin, Protonix  Normal 2D echo examination  Okay to transfer out of ICU patient need continuous ECG monitoring    Electronically signed by Juan Jose Hyman

## 2024-08-15 NOTE — PLAN OF CARE
Problem: Discharge Planning  Goal: Discharge to home or other facility with appropriate resources  8/15/2024 1443 by Lewis Martinez, RN  Outcome: Progressing  Flowsheets  Taken 8/15/2024 1200  Discharge to home or other facility with appropriate resources:   Identify barriers to discharge with patient and caregiver   Arrange for needed discharge resources and transportation as appropriate  Taken 8/15/2024 0800  Discharge to home or other facility with appropriate resources:   Identify barriers to discharge with patient and caregiver   Arrange for needed discharge resources and transportation as appropriate    Problem: Pain  Goal: Verbalizes/displays adequate comfort level or baseline comfort level  8/15/2024 1443 by Lewis Martinez, RN  Outcome: Progressing  Flowsheets  Taken 8/15/2024 1200  Verbalizes/displays adequate comfort level or baseline comfort level:   Encourage patient to monitor pain and request assistance   Assess pain using appropriate pain scale  Taken 8/15/2024 0800  Verbalizes/displays adequate comfort level or baseline comfort level:   Encourage patient to monitor pain and request assistance   Assess pain using appropriate pain scale    Problem: Safety - Adult  Goal: Free from fall injury  8/15/2024 1443 by Lewis Martinez, RN  Outcome: Progressing  Flowsheets (Taken 8/15/2024 0800)  Free From Fall Injury: Instruct family/caregiver on patient safety       Problem: Skin/Tissue Integrity  Goal: Absence of new skin breakdown  Description: 1.  Monitor for areas of redness and/or skin breakdown  2.  Assess vascular access sites hourly  3.  Every 4-6 hours minimum:  Change oxygen saturation probe site  4.  Every 4-6 hours:  If on nasal continuous positive airway pressure, respiratory therapy assess nares and determine need for appliance change or resting period.  8/15/2024 1443 by Lewis Martinez, RN  Outcome: Progressing       Problem: ABCDS Injury Assessment  Goal: Absence of physical

## 2024-08-15 NOTE — CARE COORDINATION
Per PM&R physiatrist Dr. Megahn Mcmahon, patient appropriate for Acute Inpatient Rehabilitation level of care.    Eligibility & Benefits Verified - See Note    Prior authorization request for admission initiated with primary insurance [employer] Medicare via: Online Portal: FRESS    Pending Case Reference/Authorization # [id number]     Clinical documentation submitted via Online Portal: Padma Trujillo CM:  via secure messaging  at this time.

## 2024-08-15 NOTE — PROGRESS NOTES
Occupational Therapy  [facility]   INPATIENT OCCUPATIONAL THERAPY  PROGRESS NOTE  Date: 8/15/2024  Patient Name: Amina Woods       Room:   MRN: [id number]    : 1935  (89 y.o.)  Gender: female   Referring Practitioner: Aristeo See MD  Diagnosis: Primary osteoarthritis of right hip      Discharge Recommendations:  Further Occupational Therapy is recommended upon facility discharge.    OT Equipment Recommendations  Other: TBD    Restrictions/Precautions  Restrictions/Precautions  Restrictions/Precautions: Weight Bearing;General Precautions;Fall Risk  Required Braces or Orthoses?: Yes (Pt wears certain shoes when ambulating)  Implants present? : Metal implants (B TKA, R JORJE)  Lower Extremity Weight Bearing Restrictions  Right Lower Extremity Weight Bearing: Weight Bearing As Tolerated  Position Activity Restriction  Hip Precautions: Anterior hip precautions  Other position/activity restrictions: No straight leg raises    O2 Device: None (Room air)    Subjective  Subjective  Subjective: \"I'm ready to get out of here!\" Pt in bed upon arrival and retired in chair.  Subjective  Pain: pt does not report pain at this date  Comments: JOSEPHINE AJ ok'd for OT/PT co-tx. Pt pleasant and cooperative throughout.    Objective  Orientation  Overall Orientation Status: Within Functional Limits  Cognition  Overall Cognitive Status: Exceptions  Arousal/Alertness: Appears intact  Following Commands: Follows multistep commands with repitition;Follows one step commands with repetition  Attention Span: Attends with cues to redirect  Memory: Appears intact  Safety Judgement: Decreased awareness of need for safety;Decreased awareness of need for assistance  Problem Solving: Assistance required to implement solutions;Assistance required to generate solutions  Insights: Decreased awareness of deficits  Initiation: Requires cues for some  Sequencing: Requires cues for some  Cognition Comment: Pt  demo's attempt to prematurely stand while on BSC indicating decreased awareness of need for assistance.    Activities of Daily Living  ADL    LE Dressing: Moderate assistance  LE Dressing Skilled Clinical Factors: Pt seated EOB requires TA with donning R shoe. Pt able to oxana L shoes.  Toileting: Dependent/Total  Toileting Skilled Clinical Factors: Pt standing with Max Ax2 with RW while patient completes pericare.  Functional Mobility: Dependent/Total  Functional Mobility Skilled Clinical Factors: Pt Max Ax2 with RW for pivoting. Pt presents with difficulty progressing L foot requiring increased A.  Additional Comments: Pt currently limited 2* decreased activity tolerance and RLE deficits.    Balance  Balance  Sitting Balance: Contact guard assistance (Pt seated EOB with CGA and intermittent SBA)  Standing Balance: Dependent/Total (Pt standing with Max Ax2 and RW)  Standing Balance  Time: Approx 2-3 minutes total  Activity: Functional mobility, LB ADLs  Comment: Pt completes with RW    Transfers/Mobility  Bed mobility  Supine to Sit: Dependent/Total;2 Person assistance (Pt Min A for progression of BLE and Mod A for trunk management)  Sit to Supine: Unable to assess (pt retired in chair)  Bed Mobility Comments: HOB raised.  Transfers  Sit to stand: Dependent/Total;2 Person assistance (Max Ax2 with RW)  Stand to sit: Dependent/Total;2 Person assistance (Max Ax2 with RW)  Transfer Comments: Pt completed with Max Ax2 and RW  Toilet Transfers  Toilet - Technique: Stand step  Equipment Used: Standard bedside commode  Toilet Transfer: Dependent/Total;2 Person assistance (Max Ax2 with RW)  Toilet Transfers Comments: Pt completed with Max Ax2 and RW.    Functional Mobility  Functional - Mobility Device: Rolling Walker  Activity:  (to BSC)  Assist Level: Dependent/Total (Max Ax2 with RW)  Functional Mobility Comments: Pt Max Ax2 with RW for pivoting from EOB to BSC. Pt presents with difficulty progressing L foot requiring

## 2024-08-15 NOTE — PROGRESS NOTES
Dr. Harish downey at bedside. Given updates from overnight and answered questions from patient. OK with transfer to PCU.

## 2024-08-15 NOTE — PROGRESS NOTES
Dr. Hyman called unit and writer updated MD on events and why patient was transferred over to ICU. Updated of current heart rate, blood pressure, no chest pain per patient. Orders per Dr. Hyman for magnesium lab and CBC.

## 2024-08-15 NOTE — CARE COORDINATION
IMM letter provided to patient.  Patient offered four hours to make informed decision regarding appeal process; patient agreeable to discharge.   Electronically signed by AMARILIS Mckeon on 8/15/2024 at 1:19 PM

## 2024-08-15 NOTE — CONSULTS
Comments)     Tears her skin    Anesthetic [Benzocaine]      Difficulty waking    Aspercreme Lidocaine [Lidocaine]     Benzocaine-Benzethonium      Difficulty waking    Pcn [Penicillins] Rash     Other reaction(s): Intolerance        Current Medications:   Current Facility-Administered Medications: ketorolac (TORADOL) injection 15 mg, 15 mg, IntraVENous, Q6H PRN  lactated ringers IV soln infusion, , IntraVENous, Continuous  sodium chloride flush 0.9 % injection 5-40 mL, 5-40 mL, IntraVENous, 2 times per day  sodium chloride flush 0.9 % injection 5-40 mL, 5-40 mL, IntraVENous, PRN  0.9 % sodium chloride infusion, , IntraVENous, PRN  acetaminophen (TYLENOL) tablet 650 mg, 650 mg, Oral, Q6H  oxyCODONE (ROXICODONE) immediate release tablet 5 mg, 5 mg, Oral, Q4H PRN **OR** oxyCODONE HCl (OXY-IR) immediate release tablet 10 mg, 10 mg, Oral, Q4H PRN  HYDROmorphone (DILAUDID) injection 0.5 mg, 0.5 mg, IntraVENous, Q3H PRN  ondansetron (ZOFRAN) injection 4 mg, 4 mg, IntraVENous, Q6H PRN  aspirin EC tablet 81 mg, 81 mg, Oral, BID  docusate sodium (COLACE) capsule 100 mg, 100 mg, Oral, BID PRN  pantoprazole (PROTONIX) tablet 40 mg, 40 mg, Oral, QAM AC    Family History:       Problem Relation Age of Onset    Breast Cancer Mother     Heart Disease Father         angina    Cancer Sister         melanoma    Diabetes Sister     Diabetes Sister     Cancer Brother         kidney    Cancer Brother         colon    Diabetes Maternal Grandmother     Prostate Cancer Maternal Grandfather     Cancer Maternal Aunt         melanoma    Cancer Other         ovarian       Social History:  Lives With: Alone  Type of Home: House  Home Layout: Two level, Able to Live on Main level with bedroom/bathroom, Performs ADL's on one level (Everything one floor)  Home Access: Level entry (1 small step)  Bathroom Shower/Tub: Walk-in shower, Doors  Bathroom Toilet: Handicap height (Standard as well)  Bathroom Equipment: Grab bars in shower, Hand-held  shower  Bathroom Accessibility: Accessible  Home Equipment: Rollator, Cane - Quad, Walker - Standard  Has the patient had two or more falls in the past year or any fall with injury in the past year?: No  Receives Help From: Family (3 daughters)  ADL Assistance: Independent  Homemaking Assistance: Independent (Pt completes cooking, cleaning, and laundry. Laundry on first floor.)  Homemaking Responsibilities: Yes  Ambulation Assistance: Independent  Transfer Assistance: Independent  Active : Yes  Mode of Transportation: Van, SUV  Occupation: Retired  Type of Occupation: Farming  Leisure & Hobbies: trips, Restorationist, go out to eat  IADL Comments: Pt sleeps in adjustable bed and chair at home.  Additional Comments: Pt has multiple surfaces in home. Pt reports daughters will be able to provide A as needed.  Social History     Socioeconomic History    Marital status:      Spouse name: None    Number of children: None    Years of education: None    Highest education level: None   Tobacco Use    Smoking status: Never    Smokeless tobacco: Never   Vaping Use    Vaping status: Never Used   Substance and Sexual Activity    Alcohol use: No    Drug use: No     Social Determinants of Health     Financial Resource Strain: Low Risk  (5/6/2024)    Overall Financial Resource Strain (CARDIA)     Difficulty of Paying Living Expenses: Not hard at all   Food Insecurity: No Food Insecurity (8/13/2024)    Hunger Vital Sign     Worried About Running Out of Food in the Last Year: Never true     Ran Out of Food in the Last Year: Never true   Transportation Needs: No Transportation Needs (8/13/2024)    PRAPARE - Transportation     Lack of Transportation (Medical): No     Lack of Transportation (Non-Medical): No   Physical Activity: Inactive (8/14/2023)    Exercise Vital Sign     Days of Exercise per Week: 0 days     Minutes of Exercise per Session: 0 min    Social Connections ([facility] HRSN)   Housing Stability: Low Risk  (8/13/2024)     Housing Stability Vital Sign     Unable to Pay for Housing in the Last Year: No     Number of Times Moved in the Last Year: 1     Homeless in the Last Year: No       Physical Exam:  /80   Pulse 98   Temp 98.8 °F (37.1 °C) (Oral)   Resp 24   Ht 1.6 m (5' 2.99\")   Wt 71.2 kg (157 lb)   SpO2 98%   BMI 27.82 kg/m²     GEN: Well developed, well nourished, no acute distress  HEENT: Normocephalic, atraumatic.  EOM grossly intact.  Hearing grossly intact.  Mucous membranes pink and moist.  RESP: Normal breath sounds with no wheezing, rales, or rhonchi. Respirations WNL and unlabored.  On nasal cannula oxygen.  CV: Regular rate and rhythm. No murmurs, rubs, or gallops.  ABD: Soft, non-distended, bowel sounds present and equal.  NEURO: Alert.  Speech fluent.  Sensation to light touch intact.  MSK: Muscle bulk is normal bilaterally. Strength 5/5 in the bilateral upper limbs.  Strength 4+/5 with bilateral ankle dorsiflexion and plantarflexion.  LIMBS: Edema present in right lower limb.  SKIN: Warm and dry with good turgor.  PSYCH: Mood WNL. Affect WNL. Appropriately interactive.    Diagnostics:    CBC:   Recent Labs     08/14/24  0609 08/14/24  0952   WBC  --  9.8   RBC  --  2.92*   HGB 8.9* 8.9*   HCT 26.8* 26.5*   MCV  --  90.7   RDW  --  13.3   PLT  --  235     BMP:   Recent Labs     08/14/24  0952   *   K 4.1   CL 99   CO2 21   BUN 17   CREATININE 0.9   GLUCOSE 106*      HbA1c:   Lab Results   Component Value Date    LABA1C 5.6 11/19/2013     BNP: No results for input(s): \"BNP\" in the last 72 hours.  PT/INR: No results for input(s): \"PROTIME\", \"INR\" in the last 72 hours.  APTT: No results for input(s): \"APTT\" in the last 72 hours.  CARDIAC ENZYMES: No results for input(s): \"CKMB\", \"CKMBINDEX\", \"TROPONINT\" in the last 72 hours.    Invalid input(s): \"CKTOTAL;3\"  FASTING LIPID PANEL:  Lab Results   Component Value Date    CHOL 218 06/04/2018    HDL 43 06/04/2018    TRIG 198 06/04/2018     LIVER PROFILE: No

## 2024-08-15 NOTE — CARE COORDINATION
ACUTE INPATIENT REHABILITATION  Financial Disclosure Statement: Eligibility and Benefit Verification    Patient Name: Amina Woods MRN: [id number]     Disclosure Statement  [facility] Acute Inpatient Rehabilitation at [facility] provides 24 hour individualized service to patients with functional limitations due to, but not limited to stroke, brain injury, spinal cord injury, major multiple trauma, hip fracture, amputation, and neurological disorders.  Acute Inpatient Rehabilitation provides rehabilitative nursing, physician coverage, as well as physical therapy, occupational therapy, speech therapy, recreational therapy and other services as deemed necessary by our Board Certified Physical Medicine and Rehabilitation Physicians, Dr. Reece Brandt and Dr. Ruthy Gilmore and Dr. Meghan Mcmahon.    [facility] Acute Inpatient Rehabilitation at [facility] is fully accredited by the Commission on Accreditation of Rehabilitation Facilities (CARF) and The Joint Commission (TJC).    At a minimum, you will receive 5 days of therapy services totaling at least 15 hours per week. Your treatment program will consist of physical therapy at least 7.5 hours per week; occupational therapy 7.5 hours per week; and speech therapy 1.5 hours per week, as applicable.    Your estimated length of stay is currently:  Approximately 2 Weeks  Please Note: Estimated length of stay is based on individual condition and Acute Inpatient Rehabilitation specific needs. Length of stay may vary based upon interdisciplinary team assessment, insurance approval, and patient progression.    Your insurance coverage has been verified as follows:   Date Verified: 8/15/2024   Method:  Phone Call: Call Ref# [id number], Representative: Lucrecia     Primary Insurance: [facility] Medicare   Member/Subscriber ID: [id number]   Coverage: Per Benefit Period  Plan Effective Date: 1/1/2021 Status: Active  Deductible: $300 (Amount Currently Met:

## 2024-08-15 NOTE — PROGRESS NOTES
Physical Therapy  Facility/Department: [facility] ICU  Physical Therapy Reassessment    Name: Amina Woods  : 1935  MRN: [id number]  Date of Service: 8/15/2024    Discharge Recommendations:  Continue to assess pending progress, Patient would benefit from continued therapy after discharge   PT Equipment Recommendations  Equipment Needed: No      Patient Diagnosis(es): The encounter diagnosis was Vasovagal syncope.  Past Medical History:  has a past medical history of Basal cell carcinoma, Blood in feces, Hyperlipidemia, Mild hyperglycemia., Orthostatic hypotension, Osteoarthritis, Post-menopausal, Pre-cancerous skin lesions., Prolonged emergence from general anesthesia, Psoriasis, and Syncope, non cardiac.  Past Surgical History:  has a past surgical history that includes Hysterectomy; Colon surgery; Colonoscopy (, ); joint replacement (); Foot surgery; Appendectomy; Skin cancer excision; Skin cancer destruction; sigmoidoscopy (); Cataract removal with implant (Bilateral); and Total hip arthroplasty (Right, 2024).    Assessment   Assessment: Pt is HIGHLY motivated. Reassessment complete this date. Pt has a slight decrease in mobility with max of 2 up to commode and bedside chair. Pt will benefit from skilled PT services.  Treatment Diagnosis: Impaired functional mobilitly and strength secondary to R TKA.  Specific Instructions for Next Treatment: standing tolerance, progress transfers and gait, therex  Therapy Prognosis: Good  Decision Making: Medium Complexity  History: Pt admitted for R JORJE, rapid response code blue 24  Exam: ROM, MMT, balance, functional mobilitly, transfers  Clinical Presentation: Pt motivated, cooperative  Barriers to Learning: none  Requires PT Follow-Up: Yes  Activity Tolerance  Activity Tolerance: Patient limited by endurance;Patient tolerated treatment well     Plan   Physical Therapy Plan  General Plan: 2 times a day 7 days a week  Specific Instructions for Next  with RW           OutComes Score                                                  AM-PAC - Mobility    AM-PAC Basic Mobility - Inpatient   How much help is needed turning from your back to your side while in a flat bed without using bedrails?: A Lot  How much help is needed moving from lying on your back to sitting on the side of a flat bed without using bedrails?: Total  How much help is needed moving to and from a bed to a chair?: Total  How much help is needed standing up from a chair using your arms?: Total  How much help is needed walking in hospital room?: Total  How much help is needed climbing 3-5 steps with a railing?: Total  AM-PAC Inpatient Mobility Raw Score : 7  AM-PAC Inpatient T-Scale Score : 26.42  Mobility Inpatient CMS 0-100% Score: 92.36  Mobility Inpatient CMS G-Code Modifier : CM         Tinneti Score       Goals  Short Term Goals  Time Frame for Short Term Goals: upon D/C  Short Term Goal 1: Pt to demonstrate abilitly to perfrom bed mobilitly with MIN A x 2  Short Term Goal 2: Pt to demonstrate STS transfer with use of AD and MIN A x1  Short Term Goal 3: Pt to demonstrate an increased tolerance to standing activities for 5 minutes for the ease of ADL activities  Short Term Goal 4: Pt to demonstrate the abilitly to ambulate 15-20ft for safe bathroom access, use of AD, and MIN A x1  Short Term Goal 5: Pt to demonstrate increased dynamic sitting balance to GOOD- for the ease of daily activities  Patient Goals   Patient Goals : To go home       Education  Patient Education  Education Given To: Patient  Education Provided: Role of Therapy;Plan of Care;Precautions;Transfer Training  Education Method: Demonstration;Verbal  Barriers to Learning: None  Education Outcome: Continued education needed      Therapy Time   Individual Concurrent Group Co-treatment   Time In 1053         Time Out 1115         Minutes 22                 Mali Dominique, PT

## 2024-08-15 NOTE — PROGRESS NOTES
[facility] Internal Medicine  Mo Matson MD; Antonino Arias MD; Adrian Lopez MD; MD Massiel Briggs MD; Jared Horvath MD    [facility] Internal Medicine   IN-PATIENT SERVICE   [facility]    HISTORY AND PHYSICAL EXAMINATION            Date:   8/15/2024  Patient name:  Amina Woods  Date of admission:  8/13/2024  5:47 AM  MRN:   [id number]  Account:  [id number]  YOB: 1935  PCP:    Paradise Estrella MD  Room:   2010/2010-01  Code Status:    Full Code    Chief Complaint:     No chief complaint on file.  Osteoarthritis hip replacement    History Obtained From:     Patient medical record nursing staff and patient's family present in the room    History of Present Illness:     Amina Woods is a 89 y.o. Non- / non  female who presents with No chief complaint on file.   and is admitted to the hospital for the management of Primary osteoarthritis of right hip.    89-year-old lady with history of osteoarthritis hypertension hyperlipidemia suffers from osteoarthritis right hip admitted for elective right hip replacement status post surgery today pain control denies any dyspnea no chest pain no nausea vomiting no fever chills no constipation    Past Medical History:     Past Medical History:   Diagnosis Date    Basal cell carcinoma     mulitple    Blood in feces 09/19/2016    Hyperlipidemia 08/30/2011    Mild hyperglycemia. 08/30/2011    Orthostatic hypotension 08/30/2011    Osteoarthritis     knees, thumbs    Post-menopausal     Pre-cancerous skin lesions. 08/30/2011    Prolonged emergence from general anesthesia     Psoriasis 08/30/2011    Syncope, non cardiac     \"vascular syncope\" per patient - last episode was 2023        Past Surgical History:     Past Surgical History:   Procedure Laterality Date    APPENDECTOMY      during hyst    CATARACT REMOVAL WITH IMPLANT Bilateral     COLON SURGERY      fistula repair-  9.8 3.5 - 11.0 k/uL    RBC 2.92 (L) 4.0 - 5.2 m/uL    Hemoglobin 8.9 (L) 12.0 - 16.0 g/dL    Hematocrit 26.5 (L) 36 - 46 %    MCV 90.7 80 - 100 fL    MCH 30.6 26 - 34 pg    MCHC 33.7 31 - 37 g/dL    RDW 13.3 11.5 - 14.9 %    Platelets 235 150 - 450 k/uL    MPV 7.2 6.0 - 12.0 fL   D-Dimer, Quantitative    Collection Time: 08/14/24  9:52 AM   Result Value Ref Range    D-Dimer, Quant 0.29 0.00 - 0.59 ug/mL FEU   Echo (TTE) complete (PRN contrast/bubble/strain/3D)    Collection Time: 08/14/24  2:09 PM   Result Value Ref Range    LA Minor Axis 5.3 cm    LA Major [place] 6.0 cm    LA Area 2C 21.3 cm2    LA Area 4C 18.6 cm2    LA Volume MOD A2C 69 (A) 22 - 52 mL    LA Volume MOD A4C 47 22 - 52 mL    LA Volume BP 60 (A) 22 - 52 mL    LA Diameter 2.7 cm    RA Area 4C 11.5 cm2    RA Volume 21 ml    AV Mean Gradient 7 mmHg    AV VTI 38.0 cm    AV Mean Velocity 1.2 m/s    AV Peak Velocity 1.6 m/s    AV Peak Gradient 10 mmHg    AV Area by VTI 0.8 cm2    AV Area by Peak Velocity 0.9 cm2    Aortic Root 2.8 cm    IVSd 1.1 (A) 0.6 - 0.9 cm    LVIDd 3.3 (A) 3.9 - 5.3 cm    LVIDs 2.1 cm    LVOT Diameter 1.4 cm    LVOT Mean Gradient 2 mmHg    LVOT VTI 19.0 cm    LVOT Peak Velocity 0.9 m/s    LVOT Peak Gradient 3 mmHg    LVPWd 1.1 (A) 0.6 - 0.9 cm    LV E' Lateral Velocity 6 cm/s    LV E' Septal Velocity 6 cm/s    LVOT Area 1.5 cm2    LVOT SV 29.2 ml    MV E Wave Deceleration Time 169.0 ms    MV A Velocity 1.45 m/s    MV E Velocity 1.14 m/s    MV Mean Gradient 2 mmHg    MV VTI 36.7 cm    MV Mean Velocity 0.7 m/s    MV Max Velocity 1.6 m/s    MV Peak Gradient 11 mmHg    MV Area by VTI 0.8 cm2    TAPSE 2.2 1.7 cm    TR Max Velocity 1.18 m/s    TR Peak Gradient 6 mmHg    Body Surface Area 1.78 m2    Fractional Shortening 2D 36 28 - 44 %    LVIDd Index 1.90 cm/m2    LVIDs Index 1.21 cm/m2    LV RWT Ratio 0.67     LV Mass 2D 109.1 67 - 162 g    LV Mass 2D Index 62.7 43 - 95 g/m2    MV E/A 0.79     E/E' Ratio (Averaged) 19.00     E/E' Lateral  alert oriented no more episodes of bradycardia, mild check patient did not get any narcotics yesterday before the episode  Patient now does not want to take any narcotics, on Toradol and Tylenol for pain  proBNP is elevated, no pedal edema will DC fluids patient has been tolerating p.o. well and has been on 125 mm/h of fluid,  Chest x-ray this morning showed low lung volumes, patient did have some basilar crackles advised to do incentive spirometry  On room air saturating well  Patient on aspirin 81 twice daily for DVT prophylaxis as per Ortho  Anemia of blood loss likely postop  Transfer out of ICU when okay with cardiology  Patient has been hypertensive likely secondary to pain will start losartan and monitor  Consultations:   IP CONSULT TO SOCIAL WORK  IP CONSULT TO INTERNAL MEDICINE  IP CONSULT TO PHYSICAL MEDICINE REHAB  IP CONSULT TO CRITICAL CARE  IP CONSULT TO CARDIOLOGY     Patient is admitted as inpatient status because of co-morbidities listed above, severity of signs and symptoms as outlined, requirement for current medical therapies and most importantly because of direct risk to patient if care not provided in a hospital setting.  Expected length of stay > 48 hours.    Stefania Brown MD  8/15/2024  8:59 AM    Copy sent to Dr. Estrella, Paradise Pineda MD    Please note that this chart was generated using voice recognition Dragon dictation software.  Although every effort was made to ensure the accuracy of this automated transcription, some errors in transcription may have occurred.

## 2024-08-15 NOTE — PROGRESS NOTES
Karolyn, the patient's daughter updated on how the patient did overnight. Karolyn thanked writer and has no further questions at this time.

## 2024-08-15 NOTE — PROGRESS NOTES
[facility] PULMONARY,CRITICAL CARE & SLEEP   Joe Harvey MD/Domingo GILES AGACNP-BC, NP-C       Shandra GILES NP-C      Charles GILES NP-C                                  Pulmonary Critical Care Progress Note    Patient - Amina Woods   Age - 89 y.o.   - 1935  MRN - [id number]  Acct # - [id number]  Date of Admission - 2024  5:47 AM    Consulting Service/Physician:       Primary Care Physician: Paradise Estrella MD    SUBJECTIVE:     Chief Complaint: No chief complaint on file.  Syncopal event  Subjective:    Patient awake sitting in chair doing well.  She has a little bit of hip soreness but otherwise feels well.    She has had syncopal events since she was a teenager.  She estimates more than a dozen times.  She generally feels the event coming on and is able to get down to the ground.  She has been advised not to drive but continues to drive despite instructions to avoid this.  This is the first time the event has occurred while she was on a monitor and she was reported to have heart rate down in the 30s.  I do not think this is ever been witnessed before and I wonder if the patient would benefit empirically from a pacemaker to avoid ongoing events.  She is awaiting cardiology to see her.  She is established with Dr. Hyman.    Losartan was ordered for her earlier today but she refused it.  Most of her blood pressures are in the 110-120 range.  I see 1 single reading of 150 but otherwise things have been fine.  She does not want to take blood pressure medication and has had documented orthostatic hypotension in the past.    She has never been tested for sleep apnea.  She does not know whether or not she snores but does nap    VITALS  BP (!) 118/50   Pulse 94   Temp 98.3 °F (36.8 °C) (Oral)   Resp 20   Ht 1.6 m (5' 2.99\")   Wt 71.2 kg (157 lb)   SpO2 93%   BMI 27.82 kg/m²   Wt Readings from Last 3 Encounters:   24 71.2 kg

## 2024-08-15 NOTE — PROGRESS NOTES
Dr. Brown rounding at bedside. Given updates on condition, OK with transfer to PCU after cleared by Dr. Hyman.

## 2024-08-15 NOTE — CARE COORDINATION
Writer is following for potential discharge to [facility].  Writer met with pt to confirm discharge plan.    Writer placed perfectserve message to Dash with ARU to start authorization for this pt.  Electronically signed by AMARILIS Mckeon on 8/15/2024 at 11:36 AM    Writer placed call to pt daughter Karolyn for update per pt request. No answer, voicemail left for return call.  Electronically signed by AMARILIS Mckeon on 8/15/2024 at 11:42 AM

## 2024-08-15 NOTE — PROGRESS NOTES
Post Operative Progress Note    8/15/2024 9:52 AM  Info:   Admit Date: 8/13/2024  PCP: Paradise Estrella MD      Medications:   Scheduled Meds:   losartan  25 mg Oral Daily    sodium chloride flush  5-40 mL IntraVENous 2 times per day    acetaminophen  650 mg Oral Q6H    aspirin  81 mg Oral BID    pantoprazole  40 mg Oral QAM AC     Continuous Infusions:   sodium chloride       PRN Meds:ketorolac, sodium chloride flush, sodium chloride, oxyCODONE **OR** oxyCODONE, ondansetron, docusate sodium    Diet:   Diet: ADULT DIET; Regular    Subjective:   Systemic or Specific Complaints:No Complaints    Objective:     Patient Vitals for the past 24 hrs:   BP Temp Temp src Pulse Resp SpO2   08/15/24 0900 113/81 -- -- 100 20 93 %   08/15/24 0800 (!) 153/116 98.3 °F (36.8 °C) Oral (!) 105 14 94 %   08/15/24 0711 -- -- -- 90 22 92 %   08/15/24 0700 (!) 124/42 -- -- 90 23 93 %   08/15/24 0600 (!) 117/51 -- -- 86 19 --   08/15/24 0500 (!) 117/46 -- -- 96 22 --   08/15/24 0400 (!) 106/49 98.3 °F (36.8 °C) Oral (!) 110 17 95 %   08/15/24 0300 (!) 138/56 -- -- 100 22 --   08/15/24 0200 (!) 134/50 -- -- 97 19 --   08/15/24 0100 (!) 116/96 -- -- (!) 101 20 --   08/15/24 0000 (!) 157/64 98.5 °F (36.9 °C) Oral (!) 105 12 96 %   08/14/24 2300 (!) 128/43 -- -- 95 21 --   08/14/24 2201 (!) 124/49 -- -- 99 18 --   08/14/24 2130 -- -- -- -- -- 96 %   08/14/24 2115 -- -- -- -- -- 94 %   08/14/24 2100 (!) 111/38 98.6 °F (37 °C) Oral 92 16 93 %   08/14/24 2056 (!) 109/37 -- -- -- 15 96 %   08/14/24 2000 -- -- -- (!) 101 -- --   08/14/24 1900 125/80 -- -- 98 24 98 %   08/14/24 1800 (!) 131/49 -- -- 100 21 99 %   08/14/24 1700 (!) 151/65 -- -- 90 20 99 %   08/14/24 1630 (!) 139/51 -- -- 87 20 98 %   08/14/24 1600 (!) 136/48 98.8 °F (37.1 °C) Oral 87 14 99 %   08/14/24 1530 (!) 133/46 -- -- 88 14 99 %   08/14/24 1500 (!) 138/59 -- -- 97 25 94 %   08/14/24 1430 (!) 90/38 -- -- 96 18 98 %   08/14/24 1400 -- -- -- 94 19 97 %   08/14/24 1330 (!)  116/50 -- -- 95 18 97 %   08/14/24 1300 (!) 143/62 -- -- 95 26 99 %   08/14/24 1230 (!) 129/51 -- -- (!) 101 25 99 %   08/14/24 1200 (!) 118/54 98.3 °F (36.8 °C) Oral 99 22 99 %   08/14/24 1130 -- -- -- 99 20 99 %   08/14/24 1100 (!) 126/46 -- -- 100 23 99 %   08/14/24 1030 (!) 140/52 -- -- 97 18 98 %   08/14/24 1022 135/85 98.4 °F (36.9 °C) Oral 93 16 98 %   08/14/24 1000 -- -- -- 91 18 97 %   08/14/24 0957 (!) 164/57 -- -- 91 19 98 %         Wound: incision clean and dry without sign of infection   Motion: expected discomfort with range of motion in affected extremity   DVT Exam:  no evidence of DVT on physical examination         Data Review  CBC:   Recent Labs     08/14/24  0609 08/14/24  0952 08/15/24  0415   WBC  --  9.8 9.0   HGB 8.9* 8.9* 8.6*   PLT  --  235 199           Assessment:   Patient is S/P right Hip, Arthoplasty  Pain is well controlled. She has no nausea and no vomiting.    Current activity is up with assistance        Plan:      1:  Continue Physical Therapy  2:  Continue Deep venous thrombosis prophylaxis  3:  Continue Pain Control  4:  Discharge plans SNF       Electronically signed by Aristeo See MD on 8/15/2024 at 9:52 AM

## 2024-08-15 NOTE — PROGRESS NOTES
Physical Therapy  Facility/Department: [facility] ICU  Daily Treatment Note  NAME: Amina Woods  : 1935  MRN: [id number]    Date of Service: 8/15/2024    Discharge Recommendations:  Continue to assess pending progress, Patient would benefit from continued therapy after discharge   PT Equipment Recommendations  Equipment Needed: No    Patient Diagnosis(es): The encounter diagnosis was Vasovagal syncope.    Assessment   Activity Tolerance: Patient limited by endurance;Patient tolerated treatment well;Patient limited by pain  Equipment Needed: No     Plan    Physical Therapy Plan  General Plan: 2 times a day 7 days a week  Specific Instructions for Next Treatment: standing tolerance, progress transfers and gait, therex  Current Treatment Recommendations: Strengthening;ROM;Balance training;Functional mobility training;Transfer training;Therapeutic activities;Safety education & training;Patient/Caregiver education & training;Equipment evaluation, education, & procurement;Pain management;Endurance training;Gait training;Positioning     Restrictions  Restrictions/Precautions  Restrictions/Precautions: Weight Bearing, General Precautions, Fall Risk  Required Braces or Orthoses?: Yes (Pt wears certain shoes when ambulating)  Implants present? : Metal implants (B TKA, R JORJE)  Lower Extremity Weight Bearing Restrictions  Right Lower Extremity Weight Bearing: Weight Bearing As Tolerated  Position Activity Restriction  Hip Precautions: Anterior hip precautions  Other position/activity restrictions: No straight leg raises     Subjective    Subjective  Subjective: Pt sitting up in bedside chair on arrival. Pt pleasant and cooperative with therapy  Pain: Denies pain at rest but reports pain at times with mobility  Orientation  Overall Orientation Status: Within Functional Limits  Orientation Level: Oriented X4  Cognition  Overall Cognitive Status: Exceptions  Arousal/Alertness: Appears intact  Following Commands: Follows        Goals  Short Term Goals  Time Frame for Short Term Goals: upon D/C  Short Term Goal 1: Pt to demonstrate abilitly to perfrom bed mobilitly with MIN A x 2  Short Term Goal 2: Pt to demonstrate STS transfer with use of AD and MIN A x1  Short Term Goal 3: Pt to demonstrate an increased tolerance to standing activities for 5 minutes for the ease of ADL activities  Short Term Goal 4: Pt to demonstrate the abilitly to ambulate 15-20ft for safe bathroom access, use of AD, and MIN A x1  Short Term Goal 5: Pt to demonstrate increased dynamic sitting balance to GOOD- for the ease of daily activities  Patient Goals   Patient Goals : To go home    Education  Patient Education  Education Given To: Patient  Education Provided: Role of Therapy;Plan of Care;Precautions;Transfer Training;Energy Conservation  Education Method: Demonstration;Verbal  Barriers to Learning: None  Education Outcome: Continued education needed    AM-PAC - Mobility    AM-PAC Basic Mobility - Inpatient   How much help is needed turning from your back to your side while in a flat bed without using bedrails?: A Lot  How much help is needed moving from lying on your back to sitting on the side of a flat bed without using bedrails?: Total  How much help is needed moving to and from a bed to a chair?: Total  How much help is needed standing up from a chair using your arms?: Total  How much help is needed walking in hospital room?: Total  How much help is needed climbing 3-5 steps with a railing?: Total  AM-PAC Inpatient Mobility Raw Score : 7  AM-PAC Inpatient T-Scale Score : 26.42  Mobility Inpatient CMS 0-100% Score: 92.36  Mobility Inpatient CMS G-Code Modifier : CM         Therapy Time   Individual Concurrent Group Co-treatment   Time In 1458         Time Out 1540         Minutes 42                 Yasmany Kerns, PTA

## 2024-08-15 NOTE — PLAN OF CARE
Problem: Discharge Planning  Goal: Discharge to home or other facility with appropriate resources  8/15/2024 0522 by Monalisa Velazquez RN  Outcome: Progressing  Flowsheets (Taken 8/14/2024 2000)  Discharge to home or other facility with appropriate resources:   Identify barriers to discharge with patient and caregiver   Arrange for needed discharge resources and transportation as appropriate  8/14/2024 1721 by Elvie Lopez RN  Outcome: Progressing     Problem: Pain  Goal: Verbalizes/displays adequate comfort level or baseline comfort level  8/15/2024 0522 by Monalisa Velazquez RN  Outcome: Progressing  Flowsheets (Taken 8/14/2024 2100)  Verbalizes/displays adequate comfort level or baseline comfort level:   Encourage patient to monitor pain and request assistance   Assess pain using appropriate pain scale  8/14/2024 1721 by Elvie Lopez RN  Outcome: Progressing     Problem: Safety - Adult  Goal: Free from fall injury  8/15/2024 0522 by Monalisa Velazquez RN  Outcome: Progressing  8/14/2024 1721 by Elvie Lopez RN  Outcome: Progressing     Problem: Skin/Tissue Integrity  Goal: Absence of new skin breakdown  Description: 1.  Monitor for areas of redness and/or skin breakdown  2.  Assess vascular access sites hourly  3.  Every 4-6 hours minimum:  Change oxygen saturation probe site  4.  Every 4-6 hours:  If on nasal continuous positive airway pressure, respiratory therapy assess nares and determine need for appliance change or resting period.  8/15/2024 0522 by Monalisa Velazquez RN  Outcome: Progressing  8/14/2024 1721 by Elvie Lopez RN  Outcome: Progressing     Problem: ABCDS Injury Assessment  Goal: Absence of physical injury  8/15/2024 0522 by Monalisa Velazquez RN  Outcome: Progressing  8/14/2024 1721 by Elvie Lopez RN  Outcome: Progressing

## 2024-08-16 LAB
BASOPHILS # BLD: 0 K/UL (ref 0–0.2)
BASOPHILS NFR BLD: 0 % (ref 0–2)
CORTIS SERPL-MCNC: 17.5 UG/DL (ref 2.5–19.5)
CORTISOL COLLECTION INFO: NORMAL
EKG ATRIAL RATE: 99 BPM
EKG P AXIS: 41 DEGREES
EKG P-R INTERVAL: 158 MS
EKG Q-T INTERVAL: 388 MS
EKG QRS DURATION: 118 MS
EKG QTC CALCULATION (BAZETT): 497 MS
EKG R AXIS: 78 DEGREES
EKG T AXIS: 0 DEGREES
EKG VENTRICULAR RATE: 99 BPM
EOSINOPHIL # BLD: 0.1 K/UL (ref 0–0.4)
EOSINOPHILS RELATIVE PERCENT: 1 % (ref 0–4)
ERYTHROCYTE [DISTWIDTH] IN BLOOD BY AUTOMATED COUNT: 13.2 % (ref 11.5–14.9)
HCT VFR BLD AUTO: 24.4 % (ref 36–46)
HGB BLD-MCNC: 8.2 G/DL (ref 12–16)
LYMPHOCYTES NFR BLD: 0.8 K/UL (ref 1–4.8)
LYMPHOCYTES RELATIVE PERCENT: 10 % (ref 24–44)
MAGNESIUM SERPL-MCNC: 1.7 MG/DL (ref 1.6–2.6)
MCH RBC QN AUTO: 29.9 PG (ref 26–34)
MCHC RBC AUTO-ENTMCNC: 33.5 G/DL (ref 31–37)
MCV RBC AUTO: 89.1 FL (ref 80–100)
MONOCYTES NFR BLD: 0.6 K/UL (ref 0.1–1.3)
MONOCYTES NFR BLD: 8 % (ref 1–7)
NEUTROPHILS NFR BLD: 81 % (ref 36–66)
NEUTS SEG NFR BLD: 6.4 K/UL (ref 1.3–9.1)
PLATELET # BLD AUTO: 183 K/UL (ref 150–450)
PMV BLD AUTO: 6.9 FL (ref 6–12)
RBC # BLD AUTO: 2.74 M/UL (ref 4–5.2)
WBC OTHER # BLD: 7.9 K/UL (ref 3.5–11)

## 2024-08-16 PROCEDURE — 97110 THERAPEUTIC EXERCISES: CPT

## 2024-08-16 PROCEDURE — 97530 THERAPEUTIC ACTIVITIES: CPT

## 2024-08-16 PROCEDURE — 6370000000 HC RX 637 (ALT 250 FOR IP): Performed by: ORTHOPAEDIC SURGERY

## 2024-08-16 PROCEDURE — 99232 SBSQ HOSP IP/OBS MODERATE 35: CPT | Performed by: INTERNAL MEDICINE

## 2024-08-16 PROCEDURE — 97116 GAIT TRAINING THERAPY: CPT

## 2024-08-16 PROCEDURE — 83735 ASSAY OF MAGNESIUM: CPT

## 2024-08-16 PROCEDURE — 1200000000 HC SEMI PRIVATE

## 2024-08-16 PROCEDURE — 6370000000 HC RX 637 (ALT 250 FOR IP): Performed by: INTERNAL MEDICINE

## 2024-08-16 PROCEDURE — 85025 COMPLETE CBC W/AUTO DIFF WBC: CPT

## 2024-08-16 PROCEDURE — 2580000003 HC RX 258: Performed by: ORTHOPAEDIC SURGERY

## 2024-08-16 PROCEDURE — 36415 COLL VENOUS BLD VENIPUNCTURE: CPT

## 2024-08-16 PROCEDURE — 97535 SELF CARE MNGMENT TRAINING: CPT

## 2024-08-16 RX ORDER — MIDODRINE HYDROCHLORIDE 5 MG/1
5 TABLET ORAL EVERY 8 HOURS PRN
Status: DISCONTINUED | OUTPATIENT
Start: 2024-08-16 | End: 2024-08-18 | Stop reason: HOSPADM

## 2024-08-16 RX ORDER — SENNOSIDES A AND B 8.6 MG/1
2 TABLET, FILM COATED ORAL DAILY PRN
Status: CANCELLED | OUTPATIENT
Start: 2024-08-16

## 2024-08-16 RX ORDER — ACETAMINOPHEN 325 MG/1
650 TABLET ORAL EVERY 4 HOURS PRN
Status: CANCELLED | OUTPATIENT
Start: 2024-08-16

## 2024-08-16 RX ORDER — BISACODYL 10 MG
10 SUPPOSITORY, RECTAL RECTAL DAILY PRN
Status: CANCELLED | OUTPATIENT
Start: 2024-08-16

## 2024-08-16 RX ADMIN — ASPIRIN 81 MG: 81 TABLET, COATED ORAL at 21:23

## 2024-08-16 RX ADMIN — ACETAMINOPHEN 650 MG: 325 TABLET ORAL at 11:10

## 2024-08-16 RX ADMIN — PANTOPRAZOLE SODIUM 40 MG: 40 TABLET, DELAYED RELEASE ORAL at 06:27

## 2024-08-16 RX ADMIN — ACETAMINOPHEN 650 MG: 325 TABLET ORAL at 00:29

## 2024-08-16 RX ADMIN — ASPIRIN 81 MG: 81 TABLET, COATED ORAL at 07:48

## 2024-08-16 RX ADMIN — SODIUM CHLORIDE, PRESERVATIVE FREE 10 ML: 5 INJECTION INTRAVENOUS at 21:24

## 2024-08-16 RX ADMIN — SODIUM CHLORIDE, PRESERVATIVE FREE 10 ML: 5 INJECTION INTRAVENOUS at 07:48

## 2024-08-16 RX ADMIN — ACETAMINOPHEN 650 MG: 325 TABLET ORAL at 17:31

## 2024-08-16 RX ADMIN — ACETAMINOPHEN 650 MG: 325 TABLET ORAL at 06:27

## 2024-08-16 ASSESSMENT — PAIN SCALES - GENERAL
PAINLEVEL_OUTOF10: 0
PAINLEVEL_OUTOF10: 5
PAINLEVEL_OUTOF10: 0
PAINLEVEL_OUTOF10: 7
PAINLEVEL_OUTOF10: 0
PAINLEVEL_OUTOF10: 0

## 2024-08-16 ASSESSMENT — PAIN - FUNCTIONAL ASSESSMENT
PAIN_FUNCTIONAL_ASSESSMENT: ACTIVITIES ARE NOT PREVENTED
PAIN_FUNCTIONAL_ASSESSMENT: ACTIVITIES ARE NOT PREVENTED

## 2024-08-16 ASSESSMENT — PAIN DESCRIPTION - LOCATION: LOCATION: HIP

## 2024-08-16 NOTE — CARE COORDINATION
Received  [employer] Portal Correspondence from payor [employer].    Patient authorized for admission to Acute Inpatient Rehabilitation Stay under Auth/CaseRef# [id number]      Patient approved for 14 days for Acute Inpatient Rehabilitation level of care    Next Review date: 8/28/2024 by noon  Continued stay clinical documentation to be submitted via: Online Portal: [employer] or Fax [phone]   Utilization Management : N/A , Phone: [phone] Option #3    Updated Cassandra CHÁVEZ:  via secure messaging  at this time

## 2024-08-16 NOTE — PROGRESS NOTES
Patient arrived to unit, room 2060. Will update oncoming nurse of new admission.     Electronically signed by Tabitha Almaraz RN on 8/16/24 at 6:23 PM EDT

## 2024-08-16 NOTE — PROGRESS NOTES
Patient remains in the ICU just waiting for a bed out of the ICU.  Cardiology notes reviewed.  Continuing to observe.  Bradycardia noted.    No further events overnight.    Patient plan to transfer to rehab noted.    No objection from my end.  Following peripherally based on hospital policy simply because the patient is physically in the intensive care unit.    Sandor Moreira MD  Pulmonary and Critical Care  [phone] [facility]  [phone] Cell

## 2024-08-16 NOTE — PLAN OF CARE
Problem: Discharge Planning  Goal: Discharge to home or other facility with appropriate resources  8/16/2024 0325 by Janell Holcomb RN  Outcome: Progressing  8/15/2024 1443 by Lewis Martinez RN  Outcome: Progressing  Flowsheets  Taken 8/15/2024 1200  Discharge to home or other facility with appropriate resources:   Identify barriers to discharge with patient and caregiver   Arrange for needed discharge resources and transportation as appropriate  Taken 8/15/2024 0800  Discharge to home or other facility with appropriate resources:   Identify barriers to discharge with patient and caregiver   Arrange for needed discharge resources and transportation as appropriate     Problem: Pain  Goal: Verbalizes/displays adequate comfort level or baseline comfort level  8/16/2024 0325 by Janell Holcomb RN  Outcome: Progressing  8/15/2024 1443 by Lewis Martinez RN  Outcome: Progressing  Flowsheets  Taken 8/15/2024 1200  Verbalizes/displays adequate comfort level or baseline comfort level:   Encourage patient to monitor pain and request assistance   Assess pain using appropriate pain scale  Taken 8/15/2024 0800  Verbalizes/displays adequate comfort level or baseline comfort level:   Encourage patient to monitor pain and request assistance   Assess pain using appropriate pain scale     Problem: Safety - Adult  Goal: Free from fall injury  8/16/2024 0325 by Janell Holcomb RN  Outcome: Progressing  8/15/2024 1443 by Lewis Martinez RN  Outcome: Progressing  Flowsheets (Taken 8/15/2024 0800)  Free From Fall Injury: Instruct family/caregiver on patient safety     Problem: Skin/Tissue Integrity  Goal: Absence of new skin breakdown  Description: 1.  Monitor for areas of redness and/or skin breakdown  2.  Assess vascular access sites hourly  3.  Every 4-6 hours minimum:  Change oxygen saturation probe site  4.  Every 4-6 hours:  If on nasal continuous positive airway pressure, respiratory therapy assess nares and determine need  for appliance change or resting period.  8/16/2024 0325 by Janell Holcomb RN  Outcome: Progressing  8/15/2024 1443 by Lewis Martinez RN  Outcome: Progressing     Problem: ABCDS Injury Assessment  Goal: Absence of physical injury  8/16/2024 0325 by Janell Holcomb RN  Outcome: Progressing  8/15/2024 1443 by Lewis Martinez RN  Outcome: Progressing  Flowsheets (Taken 8/15/2024 0800)  Absence of Physical Injury: Implement safety measures based on patient assessment

## 2024-08-16 NOTE — PROGRESS NOTES
Physical Therapy  Rehabilitation Physical Therapy    Date: 2024  Patient Name: Amina Woods      Room:   MRN: [id number]    : 1935  (89 y.o.)  Gender: female     Referring Practitioner: Aristeo See MD  Diagnosis: DJD of the right hip  Additional Pertinent Hx: rapid response/code blue 24 - reassessment 8/15/24    Discharge Recommendations:  Continue to assess pending progress, Patient would benefit from continued therapy after discharge  Equipment Needed: No    Assessment  Assessment  Activity Tolerance: Patient limited by endurance;Patient limited by pain  Discharge Recommendations: Continue to assess pending progress;Patient would benefit from continued therapy after discharge    Plan  Physical Therapy Plan  General Plan: 2 times a day 7 days a week  Specific Instructions for Next Treatment: standing tolerance, progress transfers and gait, therex  Current Treatment Recommendations: Strengthening, ROM, Balance training, Functional mobility training, Transfer training, Therapeutic activities, Safety education & training, Patient/Caregiver education & training, Equipment evaluation, education, & procurement, Pain management, Endurance training, Gait training, Positioning     Restrictions  Restrictions/Precautions: Weight Bearing, General Precautions, Fall Risk  Implants present? : Metal implants (B TKA, R JORJE)  Hip Precautions: Anterior hip precautions  Other position/activity restrictions: No straight leg raises  Right Lower Extremity Weight Bearing: Weight Bearing As Tolerated    Subjective  Subjective  Subjective: Patient seated in bedside chair upon approach, agreeable to PT/OT co-treat with VITOR Morales. JOSEPHINE FENTON approved treatment. Patient presenting with increased swelling in right LE, at end of session patient encouraged to get back into bed & elevate right LE with cryotherapy applied.  Pain: Reports pain & \"hurt\" in right hip, does not provide formal rating.  Comments: X-Ray of  modA  Postural Correction Exercises: cueing for forward gaze during gait  Motor Control/Coordination: cueing for right LE increased step length/height  Disease-specific Exercises: pre-gait activity @ RW weight shifting left<>right, marches prior to initiating gait     Education  Education Given To: Patient  Education Provided: Plan of Care;Precautions;Transfer Training;Energy Conservation;Home Exercise Program;Fall Prevention Strategies  Education Provided Comments: Icing on:off times, Anterior hip precautions, importance of continued mobiltiy within pt tolerance, and pressing call light for assist.  Education Method: Demonstration;Verbal  Barriers to Learning: None  Education Outcome: Continued education needed    Goals  Patient Goals   Patient Goals : To go home  Short Term Goals  Time Frame for Short Term Goals: upon D/C  Short Term Goal 1: Pt to demonstrate abilitly to perfrom bed mobilitly with MIN A x 2  Short Term Goal 2: Pt to demonstrate STS transfer with use of AD and MIN A x1  Short Term Goal 3: Pt to demonstrate an increased tolerance to standing activities for 5 minutes for the ease of ADL activities  Short Term Goal 4: Pt to demonstrate the abilitly to ambulate 15-20ft for safe bathroom access, use of AD, and MIN A x1  Short Term Goal 5: Pt to demonstrate increased dynamic sitting balance to GOOD- for the ease of daily activities     AM-PAC Basic Mobility - Inpatient  How much help is needed turning from your back to your side while in a flat bed without using bedrails?: A Lot  How much help is needed moving from lying on your back to sitting on the side of a flat bed without using bedrails?: A Little  How much help is needed moving to and from a bed to a chair?: A Lot  How much help is needed standing up from a chair using your arms?: A Lot  How much help is needed walking in hospital room?: A Lot  How much help is needed climbing 3-5 steps with a railing?: Total  AM-PAC Inpatient Mobility Raw Score :  12  AM-PAC Inpatient T-Scale Score : 35.33  Mobility Inpatient CMS 0-100% Score: 68.66  Mobility Inpatient CMS G-Code Modifier : CL     PT Individual Minutes  Time In: 1413  Time Out: 1449  Minutes: 36    Electronically signed by Jemima Lance PTA on 8/16/24 at 3:35 PM EDT

## 2024-08-16 NOTE — PROGRESS NOTES
Date:   8/16/2024  Patient name: Amina Wodos  Date of admission:  8/13/2024  5:47 AM  MRN:   [id number]  YOB: 1935  PCP: Paradise Estrella MD    Reason for Admission: Primary osteoarthritis of right hip [M16.11]    Cardiology follow-up: Status post syncopal episode, transient bradycardia and hypotension   Referring physician: Dr Stefania Brown     Impression     Status post syncopal episode/transient bradycardia/hypotension  Post syncope no neurological deficit no arrhythmias noted  Right hip total arthroplasty 8/13/2024  History of fainting episodes since childhood  No history of exertional angina  No history of coronary intervention  Normal LV systolic function ejection fraction 60% no obvious valvular abnormality  Conduction disorder, right bundle branch  History of postural hypotension has been taking midodrine 2.5 mg as needed  History of hyperlipidemia  Psoriasis  History of previous syncopal episode/neurocardiogenic syncope  Moderate hiatal hernia  Postop anemia     Past surgical history bilateral knee replacement, hysterectomy         Investigation workup     ECG 8/15/2024  Sinus rhythm, right bundle branch block, no diagnostic change from 7/30/2024     Chest x-ray 8/15/2024  Low lung volumes no acute cardiopulmonary process  Moderate hiatal hernia     ECG 7/30/2024  Normal sinus rhythm heart rate 79, low voltage, right bundle branch block, heart rate 90     2D echo 8/14/2024  Ejection fraction 60 to 65% mild LVH, normal wall motion normal LV size  Normal RV size and function  No significant valvular abnormality  Normal IVC diameter and respiratory variation  No pericardial effusion     Lexiscan  Myoview stress test March 14, 2014   no ischemia no infarct    History of present illness     89-year-old female who lives alone, independent in ADL, her daughter lives next door  She underwent right total hip arthroplasty on 8/13/2024.  On 8/14/2024 at about 10 AM KYREE BAEZ was called.  She  had episode of bradycardia with hypotension and syncope.  It happened after ambulating to the bathroom.  Seen by ER physician Abbi Coma Scale was 15 she followed commands no hemiparesis no facial droop no dysphagia.  She had transient bradycardia which resolved.  Heart rate improved to 70 bpm and blood pressure was 110/50.  No arrhythmias noted.  Blood glucose was within normal range     Lab work 8/14/2024  proBNP 1798  Sodium 134, potassium 4.1, BUN 17, creatinine 0.9, glucose 106  Hemoglobin 8.9, WBC 9.8, platelets 230     Current evaluation     Patient seen and examined  She was resting on bed  She tolerated physiotherapy well  No further episode of bradycardia or hypotension or dizziness or syncope  No chest pain no palpitation  ECG monitor sinus rhythm heart rate 72 blood pressure 130/70  No obvious sign of cardiac decompensation    Hemoglobin 8.2    Medications:   Scheduled Meds:   sodium chloride flush  5-40 mL IntraVENous 2 times per day    acetaminophen  650 mg Oral Q6H    aspirin  81 mg Oral BID    pantoprazole  40 mg Oral QAM AC     Continuous Infusions:   sodium chloride       CBC:   Recent Labs     08/14/24  0952 08/15/24  0415 08/16/24  0405   WBC 9.8 9.0 7.9   HGB 8.9* 8.6* 8.2*    199 183     BMP:    Recent Labs     08/14/24  0952 08/15/24  0415   * 135   K 4.1 4.0   CL 99 100   CO2 21 24   BUN 17 10   CREATININE 0.9 0.6   GLUCOSE 106* 122*     Hepatic: No results for input(s): \"AST\", \"ALT\", \"BILITOT\", \"ALKPHOS\" in the last 72 hours.    Invalid input(s): \"ALB\"  Troponin: No results for input(s): \"TROPONINI\" in the last 72 hours.  BNP: No results for input(s): \"BNP\" in the last 72 hours.  Lipids: No results for input(s): \"CHOL\", \"HDL\" in the last 72 hours.    Invalid input(s): \"LDLCALCU\"  INR: No results for input(s): \"INR\" in the last 72 hours.    Objective:   Vitals: BP (!) 130/51   Pulse 82   Temp 98.2 °F (36.8 °C) (Oral)   Resp 18   Ht 1.6 m (5' 2.99\")   Wt 71.2 kg (157 lb)

## 2024-08-16 NOTE — PROGRESS NOTES
Report called over to [facility] to JOSEPHINE Lu. Patient brought to room 2060 with belongings.

## 2024-08-16 NOTE — PROGRESS NOTES
Physical Therapy  Rehabilitation Physical Therapy    Date: 2024  Patient Name: Amina Woods      Room:   MRN: [id number]    : 1935  (89 y.o.)  Gender: female     Referring Practitioner: Aristeo See MD  Diagnosis: DJD of the right hip  Additional Pertinent Hx: rapid response/code blue 24 - reassessment 8/15/24    Discharge Recommendations:  Continue to assess pending progress, Patient would benefit from continued therapy after discharge  Equipment Needed: No    Assessment  Assessment  Activity Tolerance: Patient limited by endurance;Patient limited by pain  Discharge Recommendations: Continue to assess pending progress;Patient would benefit from continued therapy after discharge    Plan  Physical Therapy Plan  General Plan: 2 times a day 7 days a week  Specific Instructions for Next Treatment: standing tolerance, progress transfers and gait, therex  Current Treatment Recommendations: Strengthening, ROM, Balance training, Functional mobility training, Transfer training, Therapeutic activities, Safety education & training, Patient/Caregiver education & training, Equipment evaluation, education, & procurement, Pain management, Endurance training, Gait training, Positioning     Restrictions  Restrictions/Precautions: Weight Bearing, General Precautions, Fall Risk  Implants present? : Metal implants (B TKA, R JORJE)  Hip Precautions: Anterior hip precautions  Other position/activity restrictions: No straight leg raises  Right Lower Extremity Weight Bearing: Weight Bearing As Tolerated    Subjective  Subjective  Subjective: Patient resting in bed upon approach, excited for PT intervention at this time. JOSEPHINE FENTON approved treatment.  Pain: Reports burning pain with mobility & exercise, did not provide formal rating.  Comments: X-Ray of pelvis Pre-Op showed IMPRESSION:  Moderate to severe right hip osteoarthritis.     Multilevel lumbar spinal degenerative changes with abnormal alignment.  assistance (with RW, 2nd person SBA for safety, modA for sit>stand, Roxann for pivot)  Toilet Transfer: Moderate assistance    Gait Training  Gait Training: No  Right Side Weight Bearing: As tolerated  Left Side Weight Bearing: Full    Balance  Sitting: Intact (supervision)  Standing: With support (@ RW)     PT Exercises  Exercise Treatment: bed mobility & transfers  A/AROM Exercises: supine heel slides, LAQ, marches x20 reps with assist  Circulation/Endurance Exercises: ankle pumps, quad sets, glut sets x15 reps  Pressure Relief Exercises: positional changes  Functional Mobility Circuit Training: STSs from variable surface heights<>RW (1 stand from bedside chair, 2x from SS paddles)  Static Sitting Balance Exercises: sat EOB trunk unsupported, feet on floor ~20 min  Dynamic Sitting Balance Exercises: scooting, weight shifting, postural ex, stabilization ex, trunk rotation  Static Standing Balance Exercises: standing @ RW ~2-3 min x4 reps  Dynamic Standing Balance Exercises: pivots, lateral stepping, weight shifting, postural ex  Postural Correction Exercises: demonstrating good posture @ RW  Motor Control/Coordination: cueing & assist for eccentric control  Disease-specific Exercises: pre-gait activity @ RW     Education  Education Given To: Patient  Education Provided: Plan of Care;Precautions;Transfer Training;Energy Conservation;Home Exercise Program;Fall Prevention Strategies  Education Method: Demonstration;Verbal  Barriers to Learning: None  Education Outcome: Continued education needed    Goals  Patient Goals   Patient Goals : To go home  Short Term Goals  Time Frame for Short Term Goals: upon D/C  Short Term Goal 1: Pt to demonstrate abilitly to perfrom bed mobilitly with MIN A x 2  Short Term Goal 2: Pt to demonstrate STS transfer with use of AD and MIN A x1  Short Term Goal 3: Pt to demonstrate an increased tolerance to standing activities for 5 minutes for the ease of ADL activities  Short Term Goal 4: Pt

## 2024-08-16 NOTE — CARE COORDINATION
ACUTE INPATIENT REHABILITATION  [facility]  PRE-ADMISSION ASSESSMENT    Patient Name: Amina Woods        MRN: [id number]    : 1935  (89 y.o.)  Gender: female   Ethnicity: Not , /a or [place] Origin  Race: White    Admitted from:  [facility]     Type of Admission:  New Admission     Date of Onset / Admission to the Acute Hospital:  2024     Inpatient Rehabilitation Admitting Diagnosis:   Right hip osteoarthritis s/p total hip arthroplasty     Did patient have surgery/procedures?  Yes, As Listed Below   24: HIP TOTAL ARTHROPLASTY ASI     Physicians:   Aristeo See MD  Physician  Orthopedic Surgery    Juan Jose Hyman MD  Physician  Cardiology    Stefania Brown MD  Physician  Internal Medicine    Adrian Lopez MD  Physician  Internal Medicine      Risk for Clinical Complications:  Low     Co-morbidities:    Right hip osteoarthritis s/p total hip arthroplasty on   Episode of unresponsiveness secondary to bradycardia and hypotension  Anemia  Psoriasis  HLD    Financial Information  Primary insurance: Medicare HMO: BCBS Medicare      Secondary Insurance: None     Precautions:   []Cardiac Precautions: No Cardiac Precautions  [x]Total hip precautions: Yes: Anterior hip precautions; No SLR   [x]Weight Bearing status: Yes: Right Lower Extremity Weight Bearing: Weight Bearing As Tolerated   [x]Safety Precautions/Concerns:  Fall Risk, General Precautions  [x]Visually impaired: Yes: Vision Exceptions: Wears glasses for reading    []Hard of Hearing: Within Functional Limits       Communication Aids, Devices or Interpretation Services Required: None    Isolation Precautions:  None: Standard Precautions       Physiatrist: Dr. Meghan Mcmahon      Patients Occupation: Retired    Reviewed Lab and Diagnostic reports from Current Admission: Yes    Patients Prior Functional  Level: Prior Function  Receives Help From: Family (3 daughters)  ADL Assistance:  locomotion:  Ambulation  WB Status: WBAT RLE no SLR  Ambulation  Surface: Level tile  Device: Rolling Walker  Assistance: Maximum assistance, 2 Person assistance  Quality of Gait: decreased stance time RLE, small steps  Gait Deviations: Decreased step height, Decreased step length  Distance: 3 steps left to commode  Comments: Cues and increased time. 3rd assist to guide feet to commode initially. Pt reports \"my legs feel so heavy like I can't move them\"  More Ambulation?: No    Current functional status for comprehension: Within Functional Limits    Current functional status for expression: Within Functional Limits    Current functional status for social interaction: Within Functional Limits    Current functional status for problem solving: Within Functional Limits    Current functional status for memory: Within Functional Limits    Current Deficits R/T Impairment: Impaired Functional Mobility and Decreased ADLs    Required Therapy Services:  Physical Therapy: Yes  Occupational Therapy: Yes  Speech Therapy: Not Indicated At This Time      Additional Services:    [x] /Case Management    [] Recreational Therapy, as appropriate    [x] Nutrition Consult, as appropriate  [x] Dietary Needs/Preferences: No Specialized Dietary Needs/Preferences Identified  [] Dialysis  [x] Cultural/Zoroastrian Needs/Preferences: Cultural/Zoroastrian Needs/Preferences indicated as follows: Hinduism   [] Special Equipment Needs  [] Special Medication Needs  [] Other information relevant to patient's care needs:    Preferred Language: English    Does the patient need or want an  to communicate with a doctor or health care staff? No    Rehab Justification:  Needs 3 hrs therapy per day or 15 hours per week:  Yes  Identified Rehab Nursing needs: Yes  Intense Interdisciplinary need:  Yes  Need for 24 hr physician supervision:  Yes  Measurable improved quality of life:  Yes  Willingness to participate:  Yes  Medical  Necessity:  Yes  Patient able to tolerate care proposed:  Yes    Expected Discharge Destination/Functional Level:  Home with assist    Expected length of time to achieve level of improvement: Approximately 2 Weeks  Please Note: Estimated length of stay is based on individual condition and Acute Inpatient Rehabilitation specific needs. Length of stay may vary based upon interdisciplinary team assessment, insurance approval, and patient progression.    Expected Post Discharge Treatments: Home with possible Home Care    Acute Inpatient Rehabilitation Disclosure Statement will be provided to patient upon admission to ARU with patient's verbalization of understanding.      I have reviewed and concur with the findings and results of the pre-admission screening assessment completed by the Inpatient Rehabilitation Admissions Coordinator.

## 2024-08-16 NOTE — PROGRESS NOTES
Writer called Dr. Hyman inquiring about transfer order for patient. OK with transfer to [facility] with tele.

## 2024-08-16 NOTE — PROGRESS NOTES
Date/Time:7/16/23, 15002300    Trauma/Ortho/Medical (Choose one) Medical    Diagnosis:Fall, closed fracture of 3rd thoracic vetebrate  POD#:N/A  Mental Status:A&Ox 4  Activity/dangle : A1 GBW/ braces  Diet:Regular  Pain:Tylenol   Limon/Voiding:Purewick in place  Tele/Restraints/Iso:N/A  02/LDA:VSS on RA  D/C Date:pending placement  Other Info:Takes pills whole.                          Dr. Cadena rounding at bedside. Given updates on patient condition, OK with transfer to rehab or medc.

## 2024-08-16 NOTE — PLAN OF CARE
Problem: Discharge Planning  Goal: Discharge to home or other facility with appropriate resources  8/16/2024 1611 by Lewis Martinez RN  Outcome: Progressing  Flowsheets (Taken 8/16/2024 0800)  Discharge to home or other facility with appropriate resources:   Identify barriers to discharge with patient and caregiver   Arrange for needed discharge resources and transportation as appropriate    Problem: Pain  Goal: Verbalizes/displays adequate comfort level or baseline comfort level  8/16/2024 1611 by Lewis Martinez RN  Outcome: Progressing  Flowsheets (Taken 8/16/2024 0800)  Verbalizes/displays adequate comfort level or baseline comfort level:   Encourage patient to monitor pain and request assistance   Assess pain using appropriate pain scale       Problem: Safety - Adult  Goal: Free from fall injury  8/16/2024 1611 by Lewis Martinez RN  Outcome: Progressing  Flowsheets (Taken 8/16/2024 0800)  Free From Fall Injury: Instruct family/caregiver on patient safety    Problem: Skin/Tissue Integrity  Goal: Absence of new skin breakdown  Description: 1.  Monitor for areas of redness and/or skin breakdown  2.  Assess vascular access sites hourly  3.  Every 4-6 hours minimum:  Change oxygen saturation probe site  4.  Every 4-6 hours:  If on nasal continuous positive airway pressure, respiratory therapy assess nares and determine need for appliance change or resting period.  8/16/2024 1611 by Lewis Martinez RN  Outcome: Progressing       Problem: ABCDS Injury Assessment  Goal: Absence of physical injury  8/16/2024 1611 by Lewis Martinez RN  Outcome: Progressing  Flowsheets (Taken 8/16/2024 0800)  Absence of Physical Injury: Implement safety measures based on patient assessment

## 2024-08-16 NOTE — PROGRESS NOTES
[facility]   INPATIENT OCCUPATIONAL THERAPY  PROGRESS NOTE  Date: 2024  Patient Name: Amina Woods       Room:   MRN: [id number]    : 1935  (89 y.o.)  Gender: female   Referring Practitioner: Aristeo See MD  Diagnosis: Primary osteoarthritis of right hip; R JORJE on 24, transferred to ICU on     Discharge Recommendations:  The patient would benefit from an intensive level of therapy after discharge from the facility. They should be able to tolerate 3-hours of Combined OT/PT/ST over 5 days/week or at least 900 minutes of  Combined Therapy over 7 days.    OT Equipment Recommendations  Other: TBD    Restrictions/Precautions  Restrictions/Precautions  Restrictions/Precautions: Weight Bearing;General Precautions;Fall Risk  Required Braces or Orthoses?: Yes (Pt wears certain shoes when ambulating)  Implants present? : Metal implants (B TKA, R JORJE)  Lower Extremity Weight Bearing Restrictions  Right Lower Extremity Weight Bearing: Weight Bearing As Tolerated  Position Activity Restriction  Hip Precautions: Anterior hip precautions  Other position/activity restrictions: No straight leg raises    Pulse: (!) 118  SpO2: 97 %  O2 Device: None (Room air)  BP: 122/68  MAP (Calculated): 86  BP Location: Right lower arm  BP Method: Automatic  Patient Position: Standing  Comment: Pt /58 seated. Pt /93 standing.    Subjective  Subjective  Subjective: \"Well all we can do is try\"  Subjective  Subjective: Patient seated in bedside chair upon approach, agreeable to PT/OT co-treat with OTR Carmen. RN AJ approved treatment. Patient presenting with increased swelling in right LE, at end of session patient encouraged to get back into bed & elevate right LE with cryotherapy applied.  Pain: Pt initially states that she doesn't have any pain, \"it just hurts\" but demonstrated pain behaviors (grimacing, vocalizations, etc); After ambulation pt rated pain at  engagement in occupations.  Occupational Therapy Plan  Times Per Week: 5-7 (1-2x/day)  Current Treatment Recommendations: Self-Care / ADL, Balance training, Functional mobility training, Endurance training, Pain management, Safety education & training, Patient/Caregiver education & training, Equipment evaluation, education, & procurement, Positioning, Home management training    Assessment  Activity Tolerance  Activity Tolerance: Patient Tolerated treatment well, Patient limited by fatigue  Assessment  Performance deficits / Impairments: Decreased ADL status, Decreased functional mobility , Decreased endurance, Decreased balance, Decreased high-level IADLs, Decreased posture  Treatment Diagnosis: Impaired self care status  Prognosis: Good  Decision Making: Medium Complexity  Discharge Recommendations: Patient would benefit from continued therapy after discharge  OT Equipment Recommendations  Other: TBD  Safety Devices  Type of Devices: Patient at risk for falls, Gait belt, Left in bed, Call light within reach, Bed alarm in place    AM-PAC Daily Activities Inpatient  AM-PAC Daily Activity - Inpatient   How much help is needed for putting on and taking off regular lower body clothing?: A Lot  How much help is needed for bathing (which includes washing, rinsing, drying)?: A Lot  How much help is needed for toileting (which includes using toilet, bedpan, or urinal)?: A Lot  How much help is needed for putting on and taking off regular upper body clothing?: A Little  How much help is needed for taking care of personal grooming?: A Little  How much help for eating meals?: A Little  AM-[facility] Inpatient Daily Activity Raw Score: 15  AM-PAC Inpatient ADL T-Scale Score : 34.69  ADL Inpatient CMS 0-100% Score: 56.46  ADL Inpatient CMS G-Code Modifier : CK    OT Minutes  OT Individual Minutes  Time In: 1413  Time Out: 1449  Minutes: 36  Time Code Minutes   Timed Code Treatment Minutes: 36 Minutes    Electronically signed by Carmen  BARNEY Gregg, OTR/L on 8/16/24 at 3:40 PM EDT

## 2024-08-16 NOTE — CARE COORDINATION
Writer is following for potential discharge to [facility].  Writer placed perfectserve message to Dash in ARU to check authorization status, authorization is approve and pt is able to admit at 1500.    Rogerior met with bedside RN JOSSY regarding pt admission to ARU, telemetry is ordered for this pt. ARU is unable to accommodate telemetry.    Writer met with pt and family in room for update. Writer placed call to Dash in ARU for update.  Electronically signed by AMARILIS Mckeon on 8/16/2024 at 11:52 AM

## 2024-08-16 NOTE — PROGRESS NOTES
IN-PATIENT SERVICE   [facility] Internal Medicine    Progress Note     8/16/2024    10:17 AM    Name:   Amina Woods  MRN:     [id number]     Acct:      [id number]   Room:   2010/2010-01  IP Day:  1  Admit Date:  8/13/2024  5:47 AM    PCP:   Paradise Estrella MD  Code Status:  Full Code    Subjective:     C/C: No chief complaint on file.    Principal Problem:    Primary osteoarthritis of right hip  Resolved Problems:    * No resolved hospital problems. *      Patient, admitted to [facility] for right hip arthroplasty, procedure done on 8/13  On 8/14, patient, developed bradycardia, hypotension and syncope, CODE BLUE was called, patient, symptoms improved without any CPR  Had extensive testing including echo, D-dimer chest x-ray negative because of patient and bradycardia was considered to vasovagal  Evaluated by cardiologist  Interval history  Patient clinically doing better with PT  Pain control   Still have episode of hypotension        Recent Results (from the past 24 hour(s))   EKG 12 Lead    Collection Time: 08/15/24 12:23 PM   Result Value Ref Range    Ventricular Rate 99 BPM    Atrial Rate 99 BPM    P-R Interval 158 ms    QRS Duration 118 ms    Q-T Interval 388 ms    QTc Calculation (Bazett) 497 ms    P Axis 41 degrees    R Axis 78 degrees    T Axis 0 degrees   Magnesium    Collection Time: 08/16/24  4:05 AM   Result Value Ref Range    Magnesium 1.7 1.6 - 2.6 mg/dL   CBC with Auto Differential    Collection Time: 08/16/24  4:05 AM   Result Value Ref Range    WBC 7.9 3.5 - 11.0 k/uL    RBC 2.74 (L) 4.0 - 5.2 m/uL    Hemoglobin 8.2 (L) 12.0 - 16.0 g/dL    Hematocrit 24.4 (L) 36 - 46 %    MCV 89.1 80 - 100 fL    MCH 29.9 26 - 34 pg    MCHC 33.5 31 - 37 g/dL    RDW 13.2 11.5 - 14.9 %    Platelets 183 150 - 450 k/uL    MPV 6.9 6.0 - 12.0 fL    Neutrophils % 81 (H) 36 - 66 %    Lymphocytes % 10 (L) 24 - 44 %    Monocytes % 8 (H) 1 - 7 %    Eosinophils % 1 0 - 4 %    Basophils %  noted  Mouth: mucous membranes moist  Neck: supple, no carotid bruits, thyroid not palpable  Lungs: Bilateral equal air entry, clear to ausculation, no wheezing, rales or rhonchi, normal effort  Cardiovascular: normal rate, regular rhythm, no murmur, gallop, rub.  Abdomen: Soft, nontender, nondistended, normal bowel sounds, no hepatomegaly or splenomegaly  Neurologic: There are no new focal motor or sensory deficits,   Skin: No gross lesions, rashes, bruising or bleeding on exposed skin area  Extremities: Postop changes in right hip   Psych:             Data:     PLabs:    BMP:   Recent Labs     08/14/24  0952 08/15/24  0415   * 135   K 4.1 4.0   CO2 21 24   BUN 17 10   CREATININE 0.9 0.6   LABGLOM 61 86   GLUCOSE 106* 122*               Medications:     Allergies:    Allergies   Allergen Reactions    Latex Rash    Adhesive Tape Other (See Comments)     Tears her skin    Anesthetic [Benzocaine]      Difficulty waking    Aspercreme Lidocaine [Lidocaine]     Benzocaine-Benzethonium      Difficulty waking    Pcn [Penicillins] Rash     Other reaction(s): Intolerance       Current Meds:   Scheduled Meds:    sodium chloride flush  5-40 mL IntraVENous 2 times per day    acetaminophen  650 mg Oral Q6H    aspirin  81 mg Oral BID    pantoprazole  40 mg Oral QAM AC     Continuous Infusions:    sodium chloride       PRN Meds: midodrine, ketorolac, sodium chloride flush, sodium chloride, oxyCODONE **OR** oxyCODONE, ondansetron, docusate sodium          Assessment:        Primary Problem  Primary osteoarthritis of right hip    Principal Problem:    Primary osteoarthritis of right hip  Resolved Problems:    * No resolved hospital problems. *       Plan:          8/16/24    Right hip osteoarthritis s/p arthroplasty on 8/13  Episode of bradycardia, hypertension, likely vasovagal, use of opiates which improved currently doing okay antihypertensive kept on hold, midodrine for hypotension less than systolic 90  Aspirin 81 mg

## 2024-08-17 LAB
BASOPHILS # BLD: 0 K/UL (ref 0–0.2)
BASOPHILS NFR BLD: 0 % (ref 0–2)
EOSINOPHIL # BLD: 0.2 K/UL (ref 0–0.4)
EOSINOPHILS RELATIVE PERCENT: 3 % (ref 0–4)
ERYTHROCYTE [DISTWIDTH] IN BLOOD BY AUTOMATED COUNT: 13.6 % (ref 11.5–14.9)
HCT VFR BLD AUTO: 26.7 % (ref 36–46)
HGB BLD-MCNC: 8.9 G/DL (ref 12–16)
LYMPHOCYTES NFR BLD: 1 K/UL (ref 1–4.8)
LYMPHOCYTES RELATIVE PERCENT: 12 % (ref 24–44)
MAGNESIUM SERPL-MCNC: 1.8 MG/DL (ref 1.6–2.6)
MCH RBC QN AUTO: 30 PG (ref 26–34)
MCHC RBC AUTO-ENTMCNC: 33.5 G/DL (ref 31–37)
MCV RBC AUTO: 89.7 FL (ref 80–100)
MONOCYTES NFR BLD: 0.7 K/UL (ref 0.1–1.3)
MONOCYTES NFR BLD: 8 % (ref 1–7)
NEUTROPHILS NFR BLD: 77 % (ref 36–66)
NEUTS SEG NFR BLD: 6.5 K/UL (ref 1.3–9.1)
PLATELET # BLD AUTO: 250 K/UL (ref 150–450)
PMV BLD AUTO: 8.1 FL (ref 6–12)
RBC # BLD AUTO: 2.97 M/UL (ref 4–5.2)
WBC OTHER # BLD: 8.4 K/UL (ref 3.5–11)

## 2024-08-17 PROCEDURE — 6370000000 HC RX 637 (ALT 250 FOR IP): Performed by: INTERNAL MEDICINE

## 2024-08-17 PROCEDURE — 36415 COLL VENOUS BLD VENIPUNCTURE: CPT

## 2024-08-17 PROCEDURE — 99233 SBSQ HOSP IP/OBS HIGH 50: CPT | Performed by: INTERNAL MEDICINE

## 2024-08-17 PROCEDURE — 97530 THERAPEUTIC ACTIVITIES: CPT

## 2024-08-17 PROCEDURE — 1200000000 HC SEMI PRIVATE

## 2024-08-17 PROCEDURE — 6370000000 HC RX 637 (ALT 250 FOR IP): Performed by: ORTHOPAEDIC SURGERY

## 2024-08-17 PROCEDURE — 2580000003 HC RX 258: Performed by: ORTHOPAEDIC SURGERY

## 2024-08-17 PROCEDURE — 99024 POSTOP FOLLOW-UP VISIT: CPT | Performed by: ORTHOPAEDIC SURGERY

## 2024-08-17 PROCEDURE — 83735 ASSAY OF MAGNESIUM: CPT

## 2024-08-17 PROCEDURE — 85025 COMPLETE CBC W/AUTO DIFF WBC: CPT

## 2024-08-17 PROCEDURE — 97116 GAIT TRAINING THERAPY: CPT

## 2024-08-17 RX ORDER — OXYCODONE HYDROCHLORIDE 10 MG/1
10 TABLET ORAL EVERY 4 HOURS PRN
Status: CANCELLED | OUTPATIENT
Start: 2024-08-17

## 2024-08-17 RX ORDER — ACETAMINOPHEN 325 MG/1
650 TABLET ORAL EVERY 6 HOURS
Status: CANCELLED | OUTPATIENT
Start: 2024-08-17

## 2024-08-17 RX ORDER — PANTOPRAZOLE SODIUM 40 MG/1
40 TABLET, DELAYED RELEASE ORAL
Status: CANCELLED | OUTPATIENT
Start: 2024-08-18

## 2024-08-17 RX ORDER — ASPIRIN 81 MG/1
81 TABLET ORAL 2 TIMES DAILY
Status: CANCELLED | OUTPATIENT
Start: 2024-08-17

## 2024-08-17 RX ORDER — MIDODRINE HYDROCHLORIDE 5 MG/1
5 TABLET ORAL EVERY 8 HOURS PRN
Status: CANCELLED | OUTPATIENT
Start: 2024-08-17

## 2024-08-17 RX ORDER — OXYCODONE HYDROCHLORIDE 5 MG/1
5 TABLET ORAL EVERY 4 HOURS PRN
Status: CANCELLED | OUTPATIENT
Start: 2024-08-17

## 2024-08-17 RX ADMIN — ACETAMINOPHEN 650 MG: 325 TABLET ORAL at 05:51

## 2024-08-17 RX ADMIN — ASPIRIN 81 MG: 81 TABLET, COATED ORAL at 08:45

## 2024-08-17 RX ADMIN — ACETAMINOPHEN 650 MG: 325 TABLET ORAL at 11:24

## 2024-08-17 RX ADMIN — SODIUM CHLORIDE, PRESERVATIVE FREE 10 ML: 5 INJECTION INTRAVENOUS at 21:07

## 2024-08-17 RX ADMIN — PANTOPRAZOLE SODIUM 40 MG: 40 TABLET, DELAYED RELEASE ORAL at 05:51

## 2024-08-17 RX ADMIN — ACETAMINOPHEN 650 MG: 325 TABLET ORAL at 23:20

## 2024-08-17 RX ADMIN — ASPIRIN 81 MG: 81 TABLET, COATED ORAL at 21:07

## 2024-08-17 RX ADMIN — ACETAMINOPHEN 650 MG: 325 TABLET ORAL at 17:41

## 2024-08-17 RX ADMIN — ACETAMINOPHEN 650 MG: 325 TABLET ORAL at 00:06

## 2024-08-17 ASSESSMENT — PAIN - FUNCTIONAL ASSESSMENT
PAIN_FUNCTIONAL_ASSESSMENT: ACTIVITIES ARE NOT PREVENTED

## 2024-08-17 ASSESSMENT — PAIN SCALES - GENERAL
PAINLEVEL_OUTOF10: 3
PAINLEVEL_OUTOF10: 2
PAINLEVEL_OUTOF10: 0
PAINLEVEL_OUTOF10: 3
PAINLEVEL_OUTOF10: 3

## 2024-08-17 ASSESSMENT — PAIN DESCRIPTION - ORIENTATION
ORIENTATION: RIGHT

## 2024-08-17 ASSESSMENT — PAIN SCALES - WONG BAKER: WONGBAKER_NUMERICALRESPONSE: NO HURT

## 2024-08-17 ASSESSMENT — PAIN DESCRIPTION - LOCATION
LOCATION: HIP

## 2024-08-17 ASSESSMENT — PAIN DESCRIPTION - DESCRIPTORS
DESCRIPTORS: ACHING
DESCRIPTORS: ACHING

## 2024-08-17 NOTE — PROGRESS NOTES
Surgical Progress Note    POD:      Patient doing fairly well  Vitals:    24 1615   BP: (!) 142/59   Pulse:    Resp: 16   Temp: 97.8 °F (36.6 °C)   SpO2: 90%      Temp (24hrs), Av.1 °F (36.7 °C), Min:97.7 °F (36.5 °C), Max:98.6 °F (37 °C)       Pain Control good  No unusual nausea    Exam: doing fair        Lungs:  No respiratory distress    Labs reviewed:  Labs:  WBC/Hgb/Hct/Plts:  8.4/8.9/26.7/250 (613)  BUN/Cr/glu/ALT/AST/amyl/lip:  10/0.6/--/--/--/--/-- (08/15 0415)     Na/K/Cl/CO2:  135/4.0/100/24 (08/15 0415)    No intake/output data recorded.    Assessment:    Patient Active Problem List   Diagnosis    Osteoarthritis    Hyperlipidemia    Mild hyperglycemia.    Orthostatic hypotension    Psoriasis    Pre-cancerous skin lesions.    Elevated blood pressure reading    S/P total knee replacement    Disorder of lipid metabolism    Anemia    Dizziness    Acid reflux    Osteopenia    Vasovagal syncope    Vitamin D deficiency    Drug-induced constipation    Lumbar radiculopathy    Osteoarthritis of right hip    Primary osteoarthritis of right hip       Plan:  See my orders  ARU when medically cleared  Hank Vega MD MD  2024 4:57 PM

## 2024-08-17 NOTE — PROGRESS NOTES
IN-PATIENT SERVICE   [facility] Internal Medicine    Progress Note     8/17/2024    1:27 PM    Name:   Amina Woods  MRN:     [id number]     Acct:      [id number]   Room:   2060/2060-01   Day:  2  Admit Date:  8/13/2024  5:47 AM    PCP:   Paradise Estrella MD  Code Status:  Full Code    Subjective:     C/C: No chief complaint on file.    Principal Problem:    Primary osteoarthritis of right hip  Resolved Problems:    * No resolved hospital problems. *      Patient, admitted to [facility] for right hip arthroplasty, procedure done on 8/13  On 8/14, patient, developed bradycardia, hypotension and syncope, CODE BLUE was called, patient, symptoms improved without any CPR  Had extensive testing including echo, D-dimer chest x-ray negative because of patient and bradycardia was considered to vasovagal  Evaluated by cardiologist  Interval history  Patient clinically doing better with PT  Pain control   Still have episode of hypotension        Recent Results (from the past 24 hour(s))   Magnesium    Collection Time: 08/17/24  6:13 AM   Result Value Ref Range    Magnesium 1.8 1.6 - 2.6 mg/dL   CBC with Auto Differential    Collection Time: 08/17/24  6:13 AM   Result Value Ref Range    WBC 8.4 3.5 - 11.0 k/uL    RBC 2.97 (L) 4.0 - 5.2 m/uL    Hemoglobin 8.9 (L) 12.0 - 16.0 g/dL    Hematocrit 26.7 (L) 36 - 46 %    MCV 89.7 80 - 100 fL    MCH 30.0 26 - 34 pg    MCHC 33.5 31 - 37 g/dL    RDW 13.6 11.5 - 14.9 %    Platelets 250 150 - 450 k/uL    MPV 8.1 6.0 - 12.0 fL    Neutrophils % 77 (H) 36 - 66 %    Lymphocytes % 12 (L) 24 - 44 %    Monocytes % 8 (H) 1 - 7 %    Eosinophils % 3 0 - 4 %    Basophils % 0 0 - 2 %    Neutrophils Absolute 6.50 1.3 - 9.1 k/uL    Lymphocytes Absolute 1.00 1.0 - 4.8 k/uL    Monocytes Absolute 0.70 0.1 - 1.3 k/uL    Eosinophils Absolute 0.20 0.0 - 0.4 k/uL    Basophils Absolute 0.00 0.0 - 0.2 k/uL     No results for input(s): \"POCGLU\" in the last 72 hours.      rate, regular rhythm, no murmur, gallop, rub.  Abdomen: Soft, nontender, nondistended, normal bowel sounds, no hepatomegaly or splenomegaly  Neurologic: There are no new focal motor or sensory deficits,   Skin: No gross lesions, rashes, bruising or bleeding on exposed skin area  Extremities: Postop changes in right hip   Psych:             Data:     PLabs:    BMP:   Recent Labs     08/15/24  0415      K 4.0   CO2 24   BUN 10   CREATININE 0.6   LABGLOM 86   GLUCOSE 122*               Medications:     Allergies:    Allergies   Allergen Reactions    Latex Rash    Adhesive Tape Other (See Comments)     Tears her skin    Anesthetic [Benzocaine]      Difficulty waking    Aspercreme Lidocaine [Lidocaine]     Benzocaine-Benzethonium      Difficulty waking    Pcn [Penicillins] Rash     Other reaction(s): Intolerance       Current Meds:   Scheduled Meds:    sodium chloride flush  5-40 mL IntraVENous 2 times per day    acetaminophen  650 mg Oral Q6H    aspirin  81 mg Oral BID    pantoprazole  40 mg Oral QAM AC     Continuous Infusions:    sodium chloride       PRN Meds: midodrine, ketorolac, sodium chloride flush, sodium chloride, oxyCODONE **OR** oxyCODONE, ondansetron, docusate sodium          Assessment:        Primary Problem  Primary osteoarthritis of right hip    Principal Problem:    Primary osteoarthritis of right hip  Resolved Problems:    * No resolved hospital problems. *       Plan:          8/16/24    Right hip osteoarthritis s/p arthroplasty on 8/13  Episode of bradycardia, hypertension, likely vasovagal, use of opiates which improved currently doing okay antihypertensive kept on hold, midodrine for hypotension less than systolic 90  Aspirin 81 mg twice a day for DVT prophylaxis  Only tylenol for Pain   Awaiting ARU Placement   Cardiology following  GERD on Protonix  Can be transferred out of ICU      .  Hospital Problems             Last Modified POA    * (Principal) Primary osteoarthritis of right hip  8/13/2024 Yes               8/17  Right hip osteoarthritis s/p arthroplasty 8/30  Transferred to ICU in the postop period due to bradycardia syncope hemodynamic instability thought to be secondary to educations  Was observed through yesterday-blood pressure heart rate have remained within normal limits last 24 hours, patient remains on room air, has not needed any midodrine  2D echo unremarkable  Pain is controlled  Hemoglobin stable no fever no leukocytosis  Awaiting ARU placement continue PT/OT  No new issues  Passing urine well, has had bowel movement lungs clear, heart sounds normal abdomen soft nontender  Currently not on any antihypertensive medication        Massiel Kwon MD  8/17/2024

## 2024-08-17 NOTE — PROGRESS NOTES
[facility] PULMONARY,CRITICAL CARE & SLEEP   Joegloria Harvey MD/Domingo GILES AGAP-BC, NP-C      Shandra GILES NP-C    Charles GILES NP-C                                         Pulmonary Progress Note    Patient - Amina Woods   Age - 89 y.o.   - 1935  MRN - [id number]  Acct # - [id number]  Date of Admission - 2024  5:47 AM    Consulting Service/Physician:       Primary Care Physician: Paradise Estrella MD    SUBJECTIVE:     Chief Complaint: \"I feel pretty good\"  Subjective:    She has been doing well transferred out of ICU to [facility].  Awaiting for clearance so she can go to acute rehab.  She is currently up in a chair, she denies any cough or shortness of breath.  She is not requiring any oxygen.  She denies any hip pain heart rate and blood pressure have been stable.  She denies any dizziness or lightheadedness.    VITALS  BP (!) 124/56   Pulse 99   Temp 97.7 °F (36.5 °C) (Oral)   Resp 16   Ht 1.6 m (5' 2.99\")   Wt 71.2 kg (157 lb)   SpO2 91%   BMI 27.82 kg/m²   Wt Readings from Last 3 Encounters:   24 71.2 kg (157 lb)   24 73.9 kg (163 lb)   24 74 kg (163 lb 3.2 oz)     I/O (24 Hours)  No intake or output data in the 24 hours ending 24 0925  Ventilator:      Exam:   Physical Exam   Constitutional: Elderly female oriented to person, place, and time. Appears well-developed and well-nourished.  Sitting up in chair, no apparent distress  HENT: Unremarkable  Head: Normocephalic and atraumatic.   Eyes: EOM are normal. Pupils are equal, round, and reactive to light.   Neck: Neck supple.   Cardiovascular:  Regular rate and rhythm.  Normal heart tones.  No JVD.    Pulmonary/Chest: Lung sounds are clear, respirations easy nonlabored, currently on room air  Abdominal: Soft. Bowel sounds are normal. There is no tenderness.   Musculoskeletal: Normal range of motion.  Generalized weakness, right hip with less swelling  from prior evaluation  Neurological:patient is alert and oriented to person, place, and time.   Skin: Skin is warm and dry.   Extremities: No edema  Infusions:      sodium chloride       Meds:     Current Facility-Administered Medications:     midodrine (PROAMATINE) tablet 5 mg, 5 mg, Oral, Q8H PRN, Edgar Cadena MD    ketorolac (TORADOL) injection 15 mg, 15 mg, IntraVENous, Q6H PRN, Stefania Brown MD    sodium chloride flush 0.9 % injection 5-40 mL, 5-40 mL, IntraVENous, 2 times per day, Aristeo See MD, 10 mL at 08/16/24 2124    sodium chloride flush 0.9 % injection 5-40 mL, 5-40 mL, IntraVENous, PRN, Aristeo See MD    0.9 % sodium chloride infusion, , IntraVENous, PRN, Aristeo See MD    acetaminophen (TYLENOL) tablet 650 mg, 650 mg, Oral, Q6H, Aristeo See MD, 650 mg at 08/17/24 0551    oxyCODONE (ROXICODONE) immediate release tablet 5 mg, 5 mg, Oral, Q4H PRN **OR** oxyCODONE HCl (OXY-IR) immediate release tablet 10 mg, 10 mg, Oral, Q4H PRN, Aristeo See MD, 10 mg at 08/13/24 1310    ondansetron (ZOFRAN) injection 4 mg, 4 mg, IntraVENous, Q6H PRN, Aristeo See MD    aspirin EC tablet 81 mg, 81 mg, Oral, BID, Aristeo See MD, 81 mg at 08/17/24 0845    docusate sodium (COLACE) capsule 100 mg, 100 mg, Oral, BID PRN, Adrian Lopez MD    pantoprazole (PROTONIX) tablet 40 mg, 40 mg, Oral, QAM ACJessica Ranvir S, MD, 40 mg at 08/17/24 0551    Lab Results:     Lab Results   Component Value Date    WBC 8.4 08/17/2024    HGB 8.9 (L) 08/17/2024    HCT 26.7 (L) 08/17/2024    MCV 89.7 08/17/2024     08/17/2024     Lab Results   Component Value Date    CALCIUM 8.5 (L) 08/15/2024     08/15/2024    K 4.0 08/15/2024    CO2 24 08/15/2024     08/15/2024    BUN 10 08/15/2024    CREATININE 0.6 08/15/2024       Radiology:   No new chest imaging    ASSESSMENT:       Symptomatic bradycardia down to the 30's with LOC, code blue was called, no CPR required, patient regained

## 2024-08-17 NOTE — PROGRESS NOTES
Physical Therapy  Facility/Department: [facility] MED SURG  Daily Treatment Note  NAME: Amina Woods  : 1935  MRN: [id number]    Date of Service: 2024    Discharge Recommendations:  Patient would benefit from continued therapy after discharge   PT Equipment Recommendations  Equipment Needed: No    Patient Diagnosis(es): The encounter diagnosis was Vasovagal syncope.    Assessment  Assessment: Pt is improving as evidence by her requiring less assistance with her transfers and gait.   Activity Tolerance: Patient tolerated treatment well  Equipment Needed: No     Plan    Physical Therapy Plan  General Plan: 2 times a day 7 days a week  Specific Instructions for Next Treatment: Continue with functional mobilty training (bed mobility, transfers and gait/RW)  Current Treatment Recommendations: Strengthening;ROM;Balance training;Functional mobility training;Transfer training;Therapeutic activities;Safety education & training;Patient/Caregiver education & training;Equipment evaluation, education, & procurement;Pain management;Endurance training;Gait training;Positioning     Restrictions  Restrictions/Precautions  Restrictions/Precautions: Weight Bearing, General Precautions, Fall Risk  Required Braces or Orthoses?: Yes (Pt wears certain shoes when ambulating)  Implants present? : Metal implants (B TKA, R JORJE)  Lower Extremity Weight Bearing Restrictions  Right Lower Extremity Weight Bearing: Weight Bearing As Tolerated  Position Activity Restriction  Hip Precautions: Anterior hip precautions  Other position/activity restrictions: No straight leg raises     Subjective   Subjective: Patient stated that her pain level was a 1/10 within the (R) hip upon arrival/up in a [place].  Pain: 1/10 (R) hip throughout the entire joint  Orientation  Overall Orientation Status: Within Normal Limits  Orientation Level: Oriented X 4  Cognition  Overall Cognitive Status: WNL  Arousal/Alertness: Appears intact  Following Commands: Follows

## 2024-08-18 ENCOUNTER — HOSPITAL ENCOUNTER (INPATIENT)
Age: 89
LOS: 12 days | Discharge: HOME | End: 2024-08-30
Attending: STUDENT IN AN ORGANIZED HEALTH CARE EDUCATION/TRAINING PROGRAM | Admitting: STUDENT IN AN ORGANIZED HEALTH CARE EDUCATION/TRAINING PROGRAM
Payer: MEDICARE

## 2024-08-18 VITALS
OXYGEN SATURATION: 93 % | HEIGHT: 63 IN | HEART RATE: 91 BPM | WEIGHT: 157 LBS | TEMPERATURE: 97.6 F | BODY MASS INDEX: 27.82 KG/M2 | SYSTOLIC BLOOD PRESSURE: 129 MMHG | DIASTOLIC BLOOD PRESSURE: 55 MMHG | RESPIRATION RATE: 16 BRPM

## 2024-08-18 DIAGNOSIS — Z96.641 S/P HIP REPLACEMENT, RIGHT: Primary | ICD-10-CM

## 2024-08-18 DIAGNOSIS — M16.11 PRIMARY OSTEOARTHRITIS OF RIGHT HIP: ICD-10-CM

## 2024-08-18 PROBLEM — R53.81 DEBILITY: Status: ACTIVE | Noted: 2024-08-18

## 2024-08-18 PROCEDURE — 99222 1ST HOSP IP/OBS MODERATE 55: CPT | Performed by: STUDENT IN AN ORGANIZED HEALTH CARE EDUCATION/TRAINING PROGRAM

## 2024-08-18 PROCEDURE — 6370000000 HC RX 637 (ALT 250 FOR IP): Performed by: ORTHOPAEDIC SURGERY

## 2024-08-18 PROCEDURE — 1180000000 HC REHAB R&B

## 2024-08-18 PROCEDURE — 99239 HOSP IP/OBS DSCHRG MGMT >30: CPT | Performed by: INTERNAL MEDICINE

## 2024-08-18 PROCEDURE — 97162 PT EVAL MOD COMPLEX 30 MIN: CPT

## 2024-08-18 PROCEDURE — 99024 POSTOP FOLLOW-UP VISIT: CPT | Performed by: ORTHOPAEDIC SURGERY

## 2024-08-18 PROCEDURE — 97530 THERAPEUTIC ACTIVITIES: CPT

## 2024-08-18 PROCEDURE — 6370000000 HC RX 637 (ALT 250 FOR IP): Performed by: INTERNAL MEDICINE

## 2024-08-18 RX ORDER — OXYCODONE HYDROCHLORIDE 5 MG/1
5 TABLET ORAL EVERY 4 HOURS PRN
Status: DISCONTINUED | OUTPATIENT
Start: 2024-08-18 | End: 2024-08-18

## 2024-08-18 RX ORDER — ACETAMINOPHEN 325 MG/1
650 TABLET ORAL EVERY 4 HOURS PRN
Status: DISCONTINUED | OUTPATIENT
Start: 2024-08-18 | End: 2024-08-24

## 2024-08-18 RX ORDER — ACETAMINOPHEN 325 MG/1
650 TABLET ORAL EVERY 6 HOURS
Status: DISCONTINUED | OUTPATIENT
Start: 2024-08-18 | End: 2024-08-30 | Stop reason: HOSPADM

## 2024-08-18 RX ORDER — OXYCODONE HYDROCHLORIDE 5 MG/1
2.5 TABLET ORAL EVERY 4 HOURS PRN
Status: DISCONTINUED | OUTPATIENT
Start: 2024-08-18 | End: 2024-08-18

## 2024-08-18 RX ORDER — PANTOPRAZOLE SODIUM 40 MG/1
40 TABLET, DELAYED RELEASE ORAL
Status: DISCONTINUED | OUTPATIENT
Start: 2024-08-19 | End: 2024-08-30 | Stop reason: HOSPADM

## 2024-08-18 RX ORDER — MIDODRINE HYDROCHLORIDE 5 MG/1
5 TABLET ORAL EVERY 8 HOURS PRN
Status: DISCONTINUED | OUTPATIENT
Start: 2024-08-18 | End: 2024-08-30 | Stop reason: HOSPADM

## 2024-08-18 RX ORDER — SENNOSIDES A AND B 8.6 MG/1
2 TABLET, FILM COATED ORAL DAILY PRN
Status: DISCONTINUED | OUTPATIENT
Start: 2024-08-18 | End: 2024-08-30 | Stop reason: HOSPADM

## 2024-08-18 RX ORDER — DOCUSATE SODIUM 100 MG/1
100 CAPSULE, LIQUID FILLED ORAL 2 TIMES DAILY PRN
Status: DISCONTINUED | OUTPATIENT
Start: 2024-08-18 | End: 2024-08-30 | Stop reason: HOSPADM

## 2024-08-18 RX ORDER — OXYCODONE HYDROCHLORIDE 10 MG/1
10 TABLET ORAL EVERY 4 HOURS PRN
Status: DISCONTINUED | OUTPATIENT
Start: 2024-08-18 | End: 2024-08-18

## 2024-08-18 RX ORDER — ASPIRIN 81 MG/1
81 TABLET ORAL 2 TIMES DAILY
Status: DISCONTINUED | OUTPATIENT
Start: 2024-08-18 | End: 2024-08-30 | Stop reason: HOSPADM

## 2024-08-18 RX ORDER — TRAMADOL HYDROCHLORIDE 50 MG/1
50 TABLET ORAL EVERY 6 HOURS PRN
Status: DISCONTINUED | OUTPATIENT
Start: 2024-08-18 | End: 2024-08-30 | Stop reason: HOSPADM

## 2024-08-18 RX ORDER — TRAMADOL HYDROCHLORIDE 50 MG/1
25 TABLET ORAL EVERY 6 HOURS PRN
Status: DISCONTINUED | OUTPATIENT
Start: 2024-08-18 | End: 2024-08-30 | Stop reason: HOSPADM

## 2024-08-18 RX ORDER — BISACODYL 10 MG
10 SUPPOSITORY, RECTAL RECTAL DAILY PRN
Status: DISCONTINUED | OUTPATIENT
Start: 2024-08-18 | End: 2024-08-30 | Stop reason: HOSPADM

## 2024-08-18 RX ADMIN — PANTOPRAZOLE SODIUM 40 MG: 40 TABLET, DELAYED RELEASE ORAL at 05:01

## 2024-08-18 RX ADMIN — ASPIRIN 81 MG: 81 TABLET, COATED ORAL at 08:13

## 2024-08-18 RX ADMIN — ASPIRIN 81 MG: 81 TABLET, COATED ORAL at 20:44

## 2024-08-18 RX ADMIN — ACETAMINOPHEN 650 MG: 325 TABLET ORAL at 05:00

## 2024-08-18 RX ADMIN — ACETAMINOPHEN 650 MG: 325 TABLET ORAL at 23:04

## 2024-08-18 RX ADMIN — ACETAMINOPHEN 650 MG: 325 TABLET ORAL at 15:49

## 2024-08-18 RX ADMIN — ACETAMINOPHEN 650 MG: 325 TABLET ORAL at 10:08

## 2024-08-18 ASSESSMENT — PAIN DESCRIPTION - DESCRIPTORS
DESCRIPTORS: ACHING

## 2024-08-18 ASSESSMENT — PAIN DESCRIPTION - LOCATION
LOCATION: HIP

## 2024-08-18 ASSESSMENT — PAIN SCALES - WONG BAKER
WONGBAKER_NUMERICALRESPONSE: NO HURT
WONGBAKER_NUMERICALRESPONSE: NO HURT

## 2024-08-18 ASSESSMENT — PAIN DESCRIPTION - ORIENTATION
ORIENTATION: RIGHT

## 2024-08-18 ASSESSMENT — PAIN SCALES - GENERAL
PAINLEVEL_OUTOF10: 2
PAINLEVEL_OUTOF10: 0
PAINLEVEL_OUTOF10: 0
PAINLEVEL_OUTOF10: 1
PAINLEVEL_OUTOF10: 3
PAINLEVEL_OUTOF10: 4

## 2024-08-18 NOTE — PROGRESS NOTES
Physical Therapy  Facility/Department: [facility] ACUTE REHAB  Rehabilitation Physical Therapy Initial Assessment    NAME: Amina Woods  : 1935 (89 y.o.)  MRN: [id number]  CODE STATUS: Full Code    Date of Service: 24      Past Medical History:   Diagnosis Date    Basal cell carcinoma     mulitple    Blood in feces 2016    Hyperlipidemia 2011    Mild hyperglycemia. 2011    Orthostatic hypotension 2011    Osteoarthritis     knees, thumbs    Post-menopausal     Pre-cancerous skin lesions. 2011    Prolonged emergence from general anesthesia     Psoriasis 2011    Syncope, non cardiac     \"vascular syncope\" per patient - last episode was      Past Surgical History:   Procedure Laterality Date    APPENDECTOMY      during [facility]    CATARACT REMOVAL WITH IMPLANT Bilateral     COLON SURGERY      fistula repair- pt not sure    COLONOSCOPY  ,     FOOT SURGERY      [place]    HYSTERECTOMY (CERVIX STATUS UNKNOWN)      complete, d/t fibroids    JOINT REPLACEMENT      Right and left knees    SIGMOIDOSCOPY      SKIN CANCER DESTRUCTION      basal cell    SKIN CANCER EXCISION      basal cell    TOTAL HIP ARTHROPLASTY Right 2024    HIP TOTAL ARTHROPLASTY ASI performed by Aristeo See MD at [facility] OR       Chart Reviewed: Yes  Patient assessed for rehabilitation services?: Yes  Additional Pertinent Hx: Per Ortho note Patient is a 89 y.o. 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 The patient has failed all types of conservative treatments including physical therapy, NSAIDs, weight loss counseling, and intra-articular steroid injection. The patient's activities of daily living have become significantly restricted. Having failed conservative treatment, the patient has agreed to proceed  with total hip arthroplasty aware of all risks highlighted in the surgical consent form.  Family / Caregiver Present: Yes (3 daughters)  Referring Practitioner:

## 2024-08-18 NOTE — PROGRESS NOTES
IN-PATIENT SERVICE   [facility] Internal Medicine    Progress Note     8/18/2024    9:15 AM    Name:   Amina Woods  MRN:     [id number]     Acct:      [id number]   Room:   2060/2060-01  IP Day:  3  Admit Date:  8/13/2024  5:47 AM    PCP:   Paradise Estrella MD  Code Status:  Full Code    Subjective:     C/C: No chief complaint on file.    Principal Problem:    Primary osteoarthritis of right hip  Resolved Problems:    * No resolved hospital problems. *      Patient, admitted to [facility] for right hip arthroplasty, procedure done on 8/13  On 8/14, patient, developed bradycardia, hypotension and syncope, CODE BLUE was called, patient, symptoms improved without any CPR  Had extensive testing including echo, D-dimer chest x-ray negative because of patient and bradycardia was considered to vasovagal  Evaluated by cardiologist  Interval history  Patient clinically doing better with PT  Pain control   Still have episode of hypotension        No results found for this or any previous visit (from the past 24 hour(s)).    No results for input(s): \"POCGLU\" in the last 72 hours.       XR CHEST PORTABLE    Result Date: 8/15/2024  EXAMINATION: ONE XRAY VIEW OF THE CHEST 8/15/2024 5:59 am COMPARISON: None. HISTORY: ORDERING SYSTEM PROVIDED HISTORY: post op hip, hypoxia TECHNOLOGIST PROVIDED HISTORY: post op hip, hypoxia Reason for Exam: Post op, Hypoxia FINDINGS: Cardiomediastinal silhouette is normal in size. There is moderate hiatal hernia. Lung volumes are low. No evidence of acute infiltrate or pulmonary edema. No pleural effusion or pneumothorax.     1. Low lung volumes.  No acute cardiopulmonary process. 2. Moderate hiatal hernia.     Echo (TTE) complete (PRN contrast/bubble/strain/3D)    Result Date: 8/14/2024    Left Ventricle: Normal left ventricular systolic function with a visually estimated EF of 60 - 65%. Left ventricle is smaller than normal. Mildly increased wall thickness.        Current Meds:   Scheduled Meds:    sodium chloride flush  5-40 mL IntraVENous 2 times per day    acetaminophen  650 mg Oral Q6H    aspirin  81 mg Oral BID    pantoprazole  40 mg Oral QAM AC     Continuous Infusions:    sodium chloride       PRN Meds: midodrine, ketorolac, sodium chloride flush, sodium chloride, oxyCODONE **OR** oxyCODONE, ondansetron, docusate sodium          Assessment:        Primary Problem  Primary osteoarthritis of right hip    Principal Problem:    Primary osteoarthritis of right hip  Resolved Problems:    * No resolved hospital problems. *       Plan:          8/16/24    Right hip osteoarthritis s/p arthroplasty on 8/13  Episode of bradycardia, hypertension, likely vasovagal, use of opiates which improved currently doing okay antihypertensive kept on hold, midodrine for hypotension less than systolic 90  Aspirin 81 mg twice a day for DVT prophylaxis  Only tylenol for Pain   Awaiting ARU Placement   Cardiology following  GERD on Protonix  Can be transferred out of ICU      .  Hospital Problems             Last Modified POA    * (Principal) Primary osteoarthritis of right hip 8/13/2024 Yes               8/17  Right hip osteoarthritis s/p arthroplasty 8/30  Transferred to ICU in the postop period due to bradycardia syncope hemodynamic instability thought to be secondary to educations  Was observed through yesterday-blood pressure heart rate have remained within normal limits last 24 hours, patient remains on room air, has not needed any midodrine  2D echo unremarkable  Pain is controlled  Hemoglobin stable no fever no leukocytosis  Awaiting ARU placement continue PT/OT  No new issues  Passing urine well, has had bowel movement lungs clear, heart sounds normal abdomen soft nontender  Currently not on any antihypertensive medication    8/18  Heart rate blood pressure remain well-controlled  Remains on room air  Hemoglobin stable  PT OT  Going to ARU  Apparently cardiology wanted to see the

## 2024-08-18 NOTE — H&P
Physical Medicine & Rehabilitation History and Physical  [facility] Acute Rehabilitation Unit     Primary care provider:  Paradise Estrella MD     Chief Complaint and Reason for Rehabilitation Admission:   ADL and mobility deficits secondary to right hip osteoarthritis s/p total hip arthroplasty    History of Present Illness:  Amina Woods is a 89 y.o. female with history of psoriasis, HLD admitted to [facility] on 8/18/2024.  She was originally admitted to [place] on 8/13/24.    She initially presented for surgical intervention for right hip osteoarthritis.  She underwent total hip arthroplasty on 8/13/24 (Dr. See).  She is WBAT to the right lower limb.  Rapid response/code blue was called on 8/14/24 for unresponsiveness but she never lost a pulse.  She was noted to have bradycardia and hypotension at that time.    She is currently requiring assistance for self-care activities and mobility prompting this admission.  She reports ongoing pain in the right lower limb.  She also notes right lower limb edema and constipation.  She denies any chest pain, palpitations, and shortness of breath    Premorbid function:  Independent    Current Function:    Physical Therapy    Restrictions/Precautions: Weight Bearing, General Precautions, Fall Risk  Implants present? : Metal implants (B TKA, R JORJE)  Hip Precautions: Anterior hip precautions  Other position/activity restrictions: No straight leg raises  Right Lower Extremity Weight Bearing: Weight Bearing As Tolerated    Bed mobility  Supine to Sit: Minimal assistance  Sit to Supine: Minimal assistance  Scooting: Contact guard assistance  Bed Mobility Comments: head of bed slightly elevated and use of rail    Transfers  Sit to Stand: Minimal Assistance, Moderate Assistance  Stand to Sit: Minimal Assistance, Moderate Assistance  Comment: pt required Roxann to stand from bed but modA from toilet with grab

## 2024-08-18 NOTE — PROGRESS NOTES
ACUTE INPATIENT REHABILITATION ADMISSION  [facility]    Patient Name: Amina Woods  MRN: [id number]   Date of Admit: 8/18/2024  Time of Arrival: 1200    Patient admitted to the Acute Inpatient Rehabilitation Unit via Wheelchair    Patient oriented to room, unit and fall prevention safety measures.  Education provided on the rehabilitation routine and therapy schedules.    Drug / Medication Regimen Review   Admitting medication orders compared with acute stay medications; home medication list reviewed with patient/family.      Medication Issues Identified ? No If Yes, Check All That Apply   []  Allergy to medication   []  Drug interactions (drug/drug, drug/food, drug/disease interactions)   []  Duplicate drug   []  Omission (drug missing from prescribed regimen)   []  Non adherence   []  Adverse reaction   []  Wrong patient, drug, dose, route, time error   []  Ineffective drug therapy       High-Risk Drug Classes: Use and Indication   Check if the patient is taking any medications by pharmacological classification If yes, check if there is an indication noted for all meds in the drug class   Antipsychotic No If yes: indication noted?  [] If no indication noted, follow up with provider for order clarification   Anticoagulant No If yes: indication noted?  []    Antibiotic No If yes: indication noted?  []    Opioid Yes If yes: indication noted?  [x]    Antiplatelet Yes If yes: indication noted?  [x]    Hypoglycemic (Including Insulin) No If yes: indication noted?  []      Attending Physical Medicine & Rehabilitation (PM&R) Admitting Order Review   Admission orders reviewed with Acute Inpatient Rehabilitation Attending PM&R Physician: Meghan Mcmahon MD     Skin Assessment   Skin assessment performed by two nurses: all active alterations in skin integrity, wounds, lines, drains and airways assessed, measured, recorded and reconciled. Refer to LDA avatar and LDA flowsheet for additional information.

## 2024-08-18 NOTE — PLAN OF CARE
Problem: Discharge Planning  Goal: Discharge to home or other facility with appropriate resources  8/18/2024 0215 by Mercedes Aguillon RN  Outcome: Progressing     Problem: Pain  Goal: Verbalizes/displays adequate comfort level or baseline comfort level  Outcome: Progressing     Problem: Safety - Adult  Goal: Free from fall injury  Outcome: Progressing  Flowsheets (Taken 8/18/2024 0213)  Free From Fall Injury: Instruct family/caregiver on patient safety     Problem: Skin/Tissue Integrity  Goal: Absence of new skin breakdown  Description: 1.  Monitor for areas of redness and/or skin breakdown  2.  Assess vascular access sites hourly  3.  Every 4-6 hours minimum:  Change oxygen saturation probe site  4.  Every 4-6 hours:  If on nasal continuous positive airway pressure, respiratory therapy assess nares and determine need for appliance change or resting period.  Outcome: Progressing     Problem: ABCDS Injury Assessment  Goal: Absence of physical injury  Outcome: Progressing  Flowsheets (Taken 8/18/2024 0213)  Absence of Physical Injury: Implement safety measures based on patient assessment

## 2024-08-18 NOTE — PROGRESS NOTES
responses to access to transportation remain applicable for the duration of the Acute Inpatient Rehabilitation encounter unless otherwise specified.       no

## 2024-08-18 NOTE — PROGRESS NOTES
Writer calls Dr Hyman regarding patient needing his official approval to go to ARU per Dr Mcmahon. This is the only thing awaiting discharge to ARU and needs to be completed prior to 1600 today.

## 2024-08-19 LAB
ALBUMIN SERPL-MCNC: 2.9 G/DL (ref 3.5–5.2)
ALP SERPL-CCNC: 73 U/L (ref 35–104)
ALT SERPL-CCNC: 10 U/L (ref 5–33)
ANION GAP SERPL CALCULATED.3IONS-SCNC: 10 MMOL/L (ref 9–17)
AST SERPL-CCNC: 15 U/L
BASOPHILS # BLD: 0 K/UL (ref 0–0.2)
BASOPHILS NFR BLD: 1 % (ref 0–2)
BILIRUB DIRECT SERPL-MCNC: <0.1 MG/DL
BILIRUB INDIRECT SERPL-MCNC: ABNORMAL MG/DL (ref 0–1)
BILIRUB SERPL-MCNC: 0.3 MG/DL (ref 0.3–1.2)
BUN SERPL-MCNC: 10 MG/DL (ref 8–23)
CALCIUM SERPL-MCNC: 8.4 MG/DL (ref 8.6–10.4)
CHLORIDE SERPL-SCNC: 101 MMOL/L (ref 98–107)
CO2 SERPL-SCNC: 27 MMOL/L (ref 20–31)
CREAT SERPL-MCNC: 0.6 MG/DL (ref 0.5–0.9)
EOSINOPHIL # BLD: 0.3 K/UL (ref 0–0.4)
EOSINOPHILS RELATIVE PERCENT: 4 % (ref 0–4)
ERYTHROCYTE [DISTWIDTH] IN BLOOD BY AUTOMATED COUNT: 13.2 % (ref 11.5–14.9)
GFR, ESTIMATED: 86 ML/MIN/1.73M2
GLUCOSE SERPL-MCNC: 123 MG/DL (ref 70–99)
HCT VFR BLD AUTO: 24.9 % (ref 36–46)
HGB BLD-MCNC: 8.1 G/DL (ref 12–16)
LYMPHOCYTES NFR BLD: 1 K/UL (ref 1–4.8)
LYMPHOCYTES RELATIVE PERCENT: 17 % (ref 24–44)
MCH RBC QN AUTO: 29.3 PG (ref 26–34)
MCHC RBC AUTO-ENTMCNC: 32.7 G/DL (ref 31–37)
MCV RBC AUTO: 89.7 FL (ref 80–100)
MONOCYTES NFR BLD: 0.7 K/UL (ref 0.1–1.3)
MONOCYTES NFR BLD: 12 % (ref 1–7)
NEUTROPHILS NFR BLD: 66 % (ref 36–66)
NEUTS SEG NFR BLD: 4.2 K/UL (ref 1.3–9.1)
PLATELET # BLD AUTO: 300 K/UL (ref 150–450)
PMV BLD AUTO: 6.6 FL (ref 6–12)
POTASSIUM SERPL-SCNC: 3.8 MMOL/L (ref 3.7–5.3)
PROT SERPL-MCNC: 5.6 G/DL (ref 6.4–8.3)
RBC # BLD AUTO: 2.77 M/UL (ref 4–5.2)
SODIUM SERPL-SCNC: 138 MMOL/L (ref 135–144)
WBC OTHER # BLD: 6.3 K/UL (ref 3.5–11)

## 2024-08-19 PROCEDURE — 85025 COMPLETE CBC W/AUTO DIFF WBC: CPT

## 2024-08-19 PROCEDURE — 97530 THERAPEUTIC ACTIVITIES: CPT

## 2024-08-19 PROCEDURE — 80048 BASIC METABOLIC PNL TOTAL CA: CPT

## 2024-08-19 PROCEDURE — 6370000000 HC RX 637 (ALT 250 FOR IP): Performed by: ORTHOPAEDIC SURGERY

## 2024-08-19 PROCEDURE — 97110 THERAPEUTIC EXERCISES: CPT

## 2024-08-19 PROCEDURE — 36415 COLL VENOUS BLD VENIPUNCTURE: CPT

## 2024-08-19 PROCEDURE — 6370000000 HC RX 637 (ALT 250 FOR IP): Performed by: STUDENT IN AN ORGANIZED HEALTH CARE EDUCATION/TRAINING PROGRAM

## 2024-08-19 PROCEDURE — 97166 OT EVAL MOD COMPLEX 45 MIN: CPT

## 2024-08-19 PROCEDURE — 97535 SELF CARE MNGMENT TRAINING: CPT

## 2024-08-19 PROCEDURE — 80076 HEPATIC FUNCTION PANEL: CPT

## 2024-08-19 PROCEDURE — 99232 SBSQ HOSP IP/OBS MODERATE 35: CPT | Performed by: PHYSICAL MEDICINE & REHABILITATION

## 2024-08-19 PROCEDURE — 97116 GAIT TRAINING THERAPY: CPT

## 2024-08-19 PROCEDURE — 1180000000 HC REHAB R&B

## 2024-08-19 RX ADMIN — ACETAMINOPHEN 650 MG: 325 TABLET ORAL at 18:08

## 2024-08-19 RX ADMIN — PANTOPRAZOLE SODIUM 40 MG: 40 TABLET, DELAYED RELEASE ORAL at 05:55

## 2024-08-19 RX ADMIN — ACETAMINOPHEN 650 MG: 325 TABLET ORAL at 12:53

## 2024-08-19 RX ADMIN — ASPIRIN 81 MG: 81 TABLET, COATED ORAL at 10:39

## 2024-08-19 RX ADMIN — ACETAMINOPHEN 650 MG: 325 TABLET ORAL at 05:54

## 2024-08-19 RX ADMIN — SENNOSIDES 17.2 MG: 8.6 TABLET, FILM COATED ORAL at 06:22

## 2024-08-19 RX ADMIN — ASPIRIN 81 MG: 81 TABLET, COATED ORAL at 21:06

## 2024-08-19 ASSESSMENT — PAIN DESCRIPTION - PAIN TYPE: TYPE: SURGICAL PAIN

## 2024-08-19 ASSESSMENT — 9 HOLE PEG TEST
TESTTIME_SECONDS: 32.98
TESTTIME_SECONDS: 32.01
TEST_RESULT: IMPAIRED
TEST_RESULT: IMPAIRED

## 2024-08-19 ASSESSMENT — PAIN - FUNCTIONAL ASSESSMENT: PAIN_FUNCTIONAL_ASSESSMENT: PREVENTS OR INTERFERES SOME ACTIVE ACTIVITIES AND ADLS

## 2024-08-19 ASSESSMENT — PAIN DESCRIPTION - DESCRIPTORS: DESCRIPTORS: ACHING

## 2024-08-19 ASSESSMENT — PAIN SCALES - GENERAL
PAINLEVEL_OUTOF10: 0
PAINLEVEL_OUTOF10: 4

## 2024-08-19 ASSESSMENT — PAIN DESCRIPTION - FREQUENCY: FREQUENCY: INTERMITTENT

## 2024-08-19 ASSESSMENT — PAIN DESCRIPTION - ORIENTATION: ORIENTATION: RIGHT

## 2024-08-19 ASSESSMENT — PAIN DESCRIPTION - ONSET: ONSET: GRADUAL

## 2024-08-19 ASSESSMENT — PAIN DESCRIPTION - LOCATION: LOCATION: HIP

## 2024-08-19 NOTE — CARE COORDINATION
[facility] Acute Inpatient Rehabilitation  Case Management Assessment  Initial Evaluation    Date/Time of Evaluation: 8/19/2024 8:25 AM  Assessment Completed by: Maria F Freyer, RN    If patient is discharged prior to next notation, then this note serves as note for discharge by case management.    Patient Name: Amina Woods                     Date / Time: 8/18/2024 11:59 AM  YOB: 1935  Diagnosis: Debility [R53.81]                     Patient Admission Status: REHAB IP   Readmission Risk (Low < 19, Mod (19-27), High > 27): Readmission Risk Score: 14.9      Current PCP: Paradise Estrella MD  PCP verified by CM? Yes  Chart Reviewed: Yes      History Provided by: Patient  Patient Orientation: Alert and Oriented, Person, Place, Situation, Self    Patient Cognition: Alert    Advance Directives:    Code Status: Full Code   Patient's Primary Decision Maker is: Legal Next of Kin      Current Services Prior to Admission:  Current Financial resources: Medicare  Current community resources: None  Current services prior to admission: Durable Medical Equipment  Type of Home Care services:  None  Current Home DME: Cane, Walker, Shower Chair  Do you have a wheelchair ramp/Plans to build? Yes  Handicap Placard: Has    Discharge Planning:  Patient lives with: Alone   Type of Home: House  Primary Care Giver: Self  Marital Status:   Patient Support Systems include: Children   Family can provide assistance at DC: Yes  Does patient have 24 hour assistance at home:  No, daughter lives next door.   Is patient agreeable to VNS or Outpatient therapy Yes, prefers out patient therapy  [place] of choice provided: Yes  List of Home Care Agencies/Outpatient therapy provided: NA- patient prefers to discharge home with family and attend OP therapy, declines [facility]  VNS/Outpatient therapy chosen: Yes, [facility] OP therapy    Does patient go to outpatient dialysis: No  If yes, location and chair time:  that you are a failure or have let yourself or your family down?   Never or 1 Day - Score 0    7. Trouble concentrating on things, such as reading the newspaper or watching television?    Never or 1 Day - Score 0    8. Moving or speaking so slowly that other people could have noticed? Or the opposite-being so fidgety or restless that you have been moving around a lot more than usual?  Never or 1 Day - Score 0    9. Thoughts that you would be better off dead or of hurting yourself in some way?   Never or 1 Day - Score 0    Total Score: 3  If score is above 15, Notify PM&R Physician    If you checked off any problems, how difficult have these problems made it for you to do your work, take care of things at home, or get along with other people?    Not Difficult At All         Social Isolation     How often do you feel lonely or isolated from those around you?  Never       Access To Transportation     2.In the past six months to a year, has lack of transportation kept you from medical appointments or from getting your medications?”     No    3.In the past six months to a year, has lack of transportation kept you from non-medical meetings, appointments, work, or from getting things that you need?     No    The responses to access to transportation remain applicable for the duration of the Acute Inpatient Rehabilitation encounter unless otherwise specified.     Maria F Freyer, RN  Acute Inpatient Rehabilitation Case Management Department  Ph:[phone] Fax: [phone]    Smart Phrase Template Revision: 06/13/2024

## 2024-08-19 NOTE — PROGRESS NOTES
[facility]   Acute Rehabilitation Occupational Therapy Evaluation     Date: 24  Patient Name: Amina Woods       Room: 2609/2609-01  MRN: [id number]  Account: [id number]   : 1935  (89 y.o.) Gender: female       Referring Practitioner: Meghan Mcmahon MD  Diagnosis: S/P hip replacement, right  Additional Pertinent Hx: Per H&P: Amina Woods is a 89 y.o. female with history of psoriasis, HLD admitted to [facility] on 2024.  She was originally admitted to [facility] on 24.     She initially presented for surgical intervention for right hip osteoarthritis.  She underwent total hip arthroplasty on 24 (Dr. See).  She is WBAT to the right lower limb.  Rapid response/code blue was called on 24 for unresponsiveness but she never lost a pulse.  She was noted to have bradycardia and hypotension at that time.     She is currently requiring assistance for self-care activities and mobility prompting this admission.  She reports ongoing pain in the right lower limb.  She also notes right lower limb edema and constipation.  She denies any chest pain, palpitations, and shortness of breath    Treatment Diagnosis: Impaired self-care status    Past Medical History:  has a past medical history of Basal cell carcinoma, Blood in feces, Hyperlipidemia, Mild hyperglycemia., Orthostatic hypotension, Osteoarthritis, Post-menopausal, Pre-cancerous skin lesions., Prolonged emergence from general anesthesia, Psoriasis, and Syncope, non cardiac.    Past Surgical History:   has a past surgical history that includes Hysterectomy; Colon surgery; Colonoscopy (, ); joint replacement (); Foot surgery; Appendectomy; Skin cancer excision; Skin cancer destruction; sigmoidoscopy (); Cataract removal with implant (Bilateral); and Total hip arthroplasty (Right, 2024).    Restrictions  Lower Extremity Weight Bearing Restrictions  Right Lower  Extremity Weight Bearing: Weight Bearing As Tolerated  Restrictions/Precautions: Weight Bearing, General Precautions, Fall Risk  Required Braces or Orthoses?: Yes (Pt wears certain shoes when ambulating)  Implants present? : Metal implants (B TKA, R JORJE)    Position Activity Restriction  Hip Precautions: Anterior hip precautions  Other position/activity restrictions: No straight leg raises    Vitals  Vitals  O2 Device: None (Room air)     Subjective  Subjective: \"That leg just doesn't seem to want to work today\" pt pleasant and agreeable for OT evaluation this AM    Comments: Okay for OT eval per nursing    Pain: 3-4/10 pain in R hip    Social/Functional History  Social/Functional History  Lives With: Alone  Type of Home: House  Home Layout: Able to Live on Main level with bedroom/bathroom, Performs ADL's on one level, One level  Home Access: Level entry  Bathroom Shower/Tub: Walk-in shower, Doors  Bathroom Toilet: Handicap height  Bathroom Equipment: Grab bars in shower, Hand-held shower  Bathroom Accessibility: Accessible  Home Equipment: Rollator, Cane - Quad, Walker - Standard  Has the patient had two or more falls in the past year or any fall with injury in the past year?: No  Receives Help From: Family  ADL Assistance: Independent  Homemaking Assistance: Independent  Homemaking Responsibilities: Yes  Ambulation Assistance: Independent  Transfer Assistance: Independent  Active : Yes  Mode of Transportation: Van  Occupation: Retired  Type of Occupation: Farming  Leisure & Hobbies: trips, [employer], go out to eat  IADL Comments: Pt sleeps in adjustable bed and chair at home. Pt has multiple surfaces in home. Pt reports daughters will be able to provide A as needed.  Additional Comments: Pt has multiple surfaces in home. Pt reports daughters will be able to provide A as needed - pt's daughter lives next door to her    Objective  Vision  Vision: Impaired  Vision Exceptions: Wears glasses for

## 2024-08-19 NOTE — PLAN OF CARE
Individualized Plan of Care  [facility] Rehabilitation Unit    Rehabilitation physician: Dr Gilmore     Admit Date: 8/18/2024     Rehabilitation Diagnosis: S/P hip replacement, right     Rehabilitation impairments: self care, mobility, and pain management    Factors facilitating achievement of predicted outcomes: Family support, Motivated, Cooperative, Pleasant, Good insight into deficits, Knowledge about rehab, and Has homemaker services  Barriers to the achievement of predicted outcomes: Pain, Decreased endurance, Lower extremity weakness, Wound Care, and Medication managment    Patient Goals: Improve independence with mobility, Increase overall strength and endurance, Increase independence with activities of daily living, Increase safety awareness, Integrate appropriate pain management plan, Assure adequate nutritional option for discharge, and Provide appropriate patient and family education      NURSING:  Nursing goals for Amina Woods while on the rehabilitation unit will include:  Adequate number of bowel movements, Urinate with no urinary retention >300ml in bladder, Maintain O2 SATs at an acceptable level during stay, Effective pain management while on the rehabilitation unit, Establish adequate pain control plan for discharge, Absence of skin breakdown while on the rehabilitation unit, Improved skin integrity via assessments including wound measurements, Avoidance of any hospital acquired infections, No signs/symptoms of infection at the wound site, Freedom from injury during hospitalization, and Complete education with patient/family with understanding demonstrated regarding disease process and resultant impairment     In order to achieve these goals, nursing interventions may include bowel/bladder training, education for medical assistive devices, medication education, O2 saturation management, energy conservation, stress management techniques, fall prevention, alarms protocol, seating

## 2024-08-19 NOTE — DISCHARGE SUMMARY
Discharge Summary     Patient ID:  Amina Woods  [id number]  89 y.o.  1935    Admit date: 8/13/2024    Discharge date and time: 8/18/2024 11:57 AM     Admitting Physician: Aristeo See MD     Admission Diagnoses: Primary osteoarthritis of right hip [M16.11]    Discharge Diagnoses: same    Admission Condition: fair    Discharged Condition: fair    Indication for Admission: Status post a right total of arthroplasty    Hospital Course: Patient had a code called for hypotension but noted to recover responding rapidly.  Patient was transferred to intensive care unit where she was noted to be stabilized    Consults: [place] [facility] and cardiology    Treatments: surgery and PT    Disposition: Acute rehab    Patient Instructions:   Discharge Medication List as of 8/18/2024 11:58 AM        CONTINUE these medications which have NOT CHANGED    Details   Calcium Carb-Cholecalciferol (CALCIUM 500 + D) 500-5 MG-MCG TABS Take 1 tablet by mouth daily, Disp-30 tabletOTC      acetaminophen (TYLENOL) 500 MG tablet Take 650 mg by mouth every 6 hours as needed for PainHistorical Med      vitamin D (CHOLECALCIFEROL) 1000 UNIT TABS tablet Take 3 tablets by mouth dailyHistorical Med      Docusate Calcium (STOOL SOFTENER PO) Take 1 tablet by mouth daily      niacin 250 MG tablet Take 1 tablet by mouth daily (with breakfast)Historical Med      therapeutic multivitamin-minerals (THERAGRAN-M) tablet Take 1 tablet by mouth dailyHistorical Med      aspirin 81 MG EC tablet Take 1 tablet by mouth every other day Taking every other day d/t bruising.Historical Med      omeprazole (PRILOSEC) 20 MG delayed release capsule TAKE 1 CAPSULE DAILY, Disp-90 capsule, R-3Normal      midodrine (PROAMATINE) 2.5 MG tablet Take 1 tablet by mouth every 4 hours as needed (near syncope) Indications: Dr Butcher only wants pt taking 2.5 mg, Disp-90 tablet, R-0Normal           Activity: activity as tolerated  Diet: regular diet  Wound Care: keep wound clean and

## 2024-08-19 NOTE — PROGRESS NOTES
Comprehensive Nutrition Assessment    Type and Reason for Visit:  Consult (ARU)    Nutrition Recommendations/Plan:   Will continue Regular diet and encourage intake of protein foods     Malnutrition Assessment:  Malnutrition Status:  At risk for malnutrition (Comment) (08/19/24 1611)    Context:  Acute Illness     Findings of the 6 clinical characteristics of malnutrition:  Energy Intake:  Mild decrease in energy intake (Comment)  Weight Loss:  No significant weight loss     Body Fat Loss:  No significant body fat loss     Muscle Mass Loss:  No significant muscle mass loss    Fluid Accumulation:  Mild Extremities   Strength:  Not Performed    Nutrition Assessment:    Pt is s/p (8/13) Hip Arthoroplasty. She is now admitted to ARU. Her diet is Regular. She states she does not like the food here but ate well at lunch today. She refuses Ensure Supplements. She states \"I won't like them because they taste like vitamins\".    Nutrition Related Findings:    Mild BLE, Labs/Meds: Reviewed,  8/15 Wound Type: Surgical Incision       Current Nutrition Intake & Therapies:    Average Meal Intake: 26-50%, 51-75%     ADULT DIET; Regular    Anthropometric Measures:  Height: 160 cm (5' 3\")  Ideal Body Weight (IBW): 115 lbs (52 kg)    Admission Body Weight: 73.9 kg (163 lb)  Current Body Weight: 73.9 kg (163 lb), 141.7 % IBW. Weight Source: Not Specified  Current BMI (kg/m2): 28.9  Usual Body Weight: 74.8 kg (165 lb) (8/23)  % Weight Change (Calculated): -1.2                    BMI Categories: Overweight (BMI 25.0-29.9)    Estimated Daily Nutrient Needs:  Energy Requirements Based On: Formula  Weight Used for Energy Requirements: Admission  Energy (kcal/day): [place] x 1.2= 1400 kcal  Weight Used for Protein Requirements: Admission  Protein (g/day): 1.2-1.3g/kg= 90-95 g  Method Used for Fluid Requirements: 1 ml/kcal  Fluid (ml/day): 1 ml /kcal=1400ml    Nutrition Diagnosis:   Predicted inadequate energy intake related to

## 2024-08-19 NOTE — PROGRESS NOTES
Physical Therapy  Facility/Department: [facility] ACUTE REHAB  Rehabilitation Physical Therapy   NAME: Amina Woods  : 1935 (89 y.o.)  MRN: [id number]  CODE STATUS: Full Code    Date of Service: 24      Past Medical History:   Diagnosis Date    Basal cell carcinoma     mulitple    Blood in feces 2016    Hyperlipidemia 2011    Mild hyperglycemia. 2011    Orthostatic hypotension 2011    Osteoarthritis     knees, thumbs    Post-menopausal     Pre-cancerous skin lesions. 2011    Prolonged emergence from general anesthesia     Psoriasis 2011    Syncope, non cardiac     \"vascular syncope\" per patient - last episode was      Past Surgical History:   Procedure Laterality Date    APPENDECTOMY      during [facility]    CATARACT REMOVAL WITH IMPLANT Bilateral     COLON SURGERY      fistula repair- pt not sure    COLONOSCOPY  ,     FOOT SURGERY      [place]    HYSTERECTOMY (CERVIX STATUS UNKNOWN)      complete, d/t fibroids    JOINT REPLACEMENT      Right and left knees    SIGMOIDOSCOPY      SKIN CANCER DESTRUCTION      basal cell    SKIN CANCER EXCISION      basal cell    TOTAL HIP ARTHROPLASTY Right 2024    HIP TOTAL ARTHROPLASTY ASI performed by Aristeo See MD at [facility] OR       Chart Reviewed: Yes  Patient assessed for rehabilitation services?: Yes  Additional Pertinent Hx: Per Ortho note Patient is a 89 y.o. 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 The patient has failed all types of conservative treatments including physical therapy, NSAIDs, weight loss counseling, and intra-articular steroid injection. The patient's activities of daily living have become significantly restricted. Having failed conservative treatment, the patient has agreed to proceed  with total hip arthroplasty aware of all risks highlighted in the surgical consent form.  Family / Caregiver Present: Yes (3 daughters)  Referring Practitioner: Aristeo See  MD  Referral Date : 08/13/24  Diagnosis: DJD of the right hip  General Comment  Comments: X-Ray of pelvis Pre-Op showed IMPRESSION:  Moderate to severe right hip osteoarthritis.     Multilevel lumbar spinal degenerative changes with abnormal alignment.    Restrictions:  Restrictions/Precautions: Weight Bearing;General Precautions;Fall Risk  Lower Extremity Weight Bearing Restrictions  Right Lower Extremity Weight Bearing: Weight Bearing As Tolerated  Position Activity Restriction  Hip Precautions: Anterior hip precautions  Other position/activity restrictions: No straight leg raises     SUBJECTIVE  Subjective: patient reporting she slept wrong and her right leg bothering her more today       OBJECTIVE          Functional Mobility  Bed mobility  Rolling to Left: Moderate assistance  Supine to Sit: Moderate assistance  Sit to Supine: Moderate assistance  Scooting: Moderate assistance  Bed Mobility Comments: HOB slightly elevated. Use of bed rail  Transfers  Sit to Stand: Moderate Assistance  Stand to Sit: Moderate Assistance  Bed to Chair: Moderate assistance    Environmental Mobility  Ambulation  Surface: Level tile  Device: Rolling Walker  Assistance: Minimal assistance  Quality of Gait: decreased stance time RLE, small steps  Gait Deviations: Decreased step length;Decreased step height;Shuffles;Slow Tiesha  Distance: 25 feet x 2; 35 feet x 1; pm 20 feet with increased pain reported in lateral thigh.  Comments: Cues and increased time. 3rd assist to guide feet to commode initially. Pt reports \"my legs feel so heavy like I can't move them\"    PT Exercises  Exercise Treatment: functional mobility; see above  PROM Exercises: R LE passive hip flexion x3  A/AROM Exercises: Supine Glut sets x 10 reps, Hip Abduction and Heel Slides 5-10 reps with each leg (AAROM)  Circulation/Endurance Exercises: ankle pumps, quad sets, glut sets x15 reps    ASSESSMENT       Activity Tolerance  Activity Tolerance: Patient tolerated

## 2024-08-19 NOTE — CARE COORDINATION
ARU CASE MANAGEMENT NOTE:    Admission Date:  8/18/2024 Amina Woods is a 89 y.o.  female    Admitted for : Debility [R53.81]    Spoke with patient regarding discharge plan: Patient is alert and oriented x4. Patient would like go home at discharge, states daughter lives next door and can provide assistance. She would like to go to OP therapy at [facility]    Outside appointments while in ARU: 8/22 with Podiatry which we will cancel. She does have an appointment on 8/28/24. Will reach out to Dr. See once we have a discharge date.     DME:None ordered today    Will continue to follow for additional discharge needs.    Electronically signed by Maria F Freyer, RN on 8/19/2024 at 11:12 AM

## 2024-08-19 NOTE — PATIENT CARE CONFERENCE
[facility] Acute Inpatient Rehabilitation  TEAM CONFERENCE NOTE  Date: 24  Patient Name: Amina Woods       Room: 2609/2609-01  MRN: [id number]       : 1935  (89 y.o.)     Gender: female     Referring Practitioner: Aristeo See MD     PRINCIPAL DIAGNOSIS: S/P hip replacement, right    NURSING--------------------------------------------------------------------------------    Bladder Continence  Always Continent  Bowel Continence Always Continent    Date of Last BM: 24    Bladder/Bowel Program Interventions: Bowel Program In Place    Wounds/Incisions/Ulcers: Wounds present on Right leg, surgical dressing in place.      Pain Control: Patient's pain currently controlled and pain regimen effective as ordered    Pain Medication Regimen Usage Pattern: MAR reviewed and pain medications are being used at the following frequency (Specify Medication, # Doses Administered on average per day, identified patterns of use - for example: time of day, prior to activity/therapy)  Scheduled Tylenol, prn Ultram. Has not taken since admission.    Fall Risk:  Falling star program initiated    Medication Education Program: Patient able to manage medications and being educated by nursing    Discharge Preparation Patient/Responsible Party Education In Progress:   Wound Care    Nursing specific communication for TEAM: No additional information identified requiring communication at this time    PHYSICAL THERAPY------------------------------------------------------------------    Bed mobility  Rolling to Left: Moderate assistance  Supine to Sit: Moderate assistance  Sit to Supine: Moderate assistance  Scooting: Moderate assistance  Bed Mobility Comments: HOB slightly elevated. Use of bed rail    Transfers:  Sit to Stand: Moderate Assistance  Stand to Sit: Moderate Assistance  Bed to Chair: Moderate assistance    Ambulation  Surface: Level tile  Device: Rolling Walker  Assistance: Minimal  Regular. PO intake appears fair with 25-75% intake of meals. Pt declined offer of oral nutrition supplements. Please see nutrition note for details.    CASE MANAGEMENT ASSESSMENT ------------------------------------------------    Discharge Disposition: Home  Family Support: daughter    PCP: Paradise Estrella MD  *If No PCP, Specify Status of Designation:Not Applicable, PCP Verified    Services Being Followed for Discharge Planning: Outpatient Therapy: [facility]    Patient Mood Interview PHQ-2 to 9 Score: 3    Pre-Admission Status:  Lives With: Alone  Type of Home: House  Home Layout: Able to Live on Main level with bedroom/bathroom, Performs ADL's on one level, One level  Home Access: Level entry  Bathroom Shower/Tub: Walk-in shower, Doors  Bathroom Toilet: Handicap height  Bathroom Equipment: Grab bars in shower, Hand-held shower  Bathroom Accessibility: Accessible  Home Equipment: Rollator, Cane - Quad, Walker - Standard  Has the patient had two or more falls in the past year or any fall with injury in the past year?: No  Receives Help From: Family  ADL Assistance: Independent  Homemaking Assistance: Independent  Homemaking Responsibilities: Yes  Ambulation Assistance: Independent  Transfer Assistance: Independent  Active : Yes  Mode of Transportation: Van  Occupation: Retired  Type of Occupation: Farming  Leisure & Hobbies: trips, [employer]azdan[employer], go out to eat  IADL Comments: Pt sleeps in adjustable bed and chair at home. Pt has multiple surfaces in home. Pt reports daughters will be able to provide A as needed.  Additional Comments: Pt has multiple surfaces in home. Pt reports daughters will be able to provide A as needed - pt's daughter lives next door to her   -----------------------------------------------------------------------------------------------------  Family Education: Need to make contact with family to initiate education    Percentage Risk for Readmission: Low 0 - 18%

## 2024-08-19 NOTE — PLAN OF CARE
Problem: Discharge Planning  Goal: Discharge to home or other facility with appropriate resources  Outcome: Progressing  Flowsheets (Taken 8/19/2024 1040)  Discharge to home or other facility with appropriate resources:   Identify barriers to discharge with patient and caregiver   Arrange for needed discharge resources and transportation as appropriate     Problem: Skin/Tissue Integrity  Goal: Absence of new skin breakdown  Description: 1.  Monitor for areas of redness and/or skin breakdown  2.  Assess vascular access sites hourly  3.  Every 4-6 hours minimum:  Change oxygen saturation probe site  4.  Every 4-6 hours:  If on nasal continuous positive airway pressure, respiratory therapy assess nares and determine need for appliance change or resting period.  Outcome: Progressing  Note: Monitoring for skin breakdown     Problem: Safety - Adult  Goal: Free from fall injury  8/19/2024 1745 by Ibis Zaragoza RN  Outcome: Progressing  Flowsheets (Taken 8/19/2024 1040)  Free From Fall Injury: Instruct family/caregiver on patient safety  8/19/2024 0429 by Gale Mckeon LPN  Outcome: Progressing     Problem: ABCDS Injury Assessment  Goal: Absence of physical injury  Outcome: Progressing  Flowsheets (Taken 8/19/2024 1745)  Absence of Physical Injury: Implement safety measures based on patient assessment     Problem: Pain  Goal: Verbalizes/displays adequate comfort level or baseline comfort level  8/19/2024 1745 by Ibis Zaragoza RN  Outcome: Progressing  Flowsheets (Taken 8/19/2024 1745)  Verbalizes/displays adequate comfort level or baseline comfort level: Encourage patient to monitor pain and request assistance  8/19/2024 0429 by Gale Mckeon LPN  Outcome: Progressing     Problem: Nutrition Deficit:  Goal: Optimize nutritional status  Outcome: Progressing  Flowsheets (Taken 8/19/2024 1745)  Nutrient intake appropriate for improving, restoring, or maintaining nutritional needs: Assess nutritional status and

## 2024-08-19 NOTE — PLAN OF CARE
Problem: Pain  Goal: Verbalizes/displays adequate comfort level or baseline comfort level  8/19/2024 0429 by Gale Mckeon LPN  Outcome: Progressing     Problem: Safety - Adult  Goal: Free from fall injury  8/19/2024 0429 by Gale Mckeon LPN  Outcome: Progressing

## 2024-08-19 NOTE — PROGRESS NOTES
Physical Medicine & Rehabilitation  Progress Note      Subjective:      89 year-old female with R hip OA treated by JORJE. Patient is having problems with pain in the R hip, requiring pain medications. She is noting some improvement with Tylenol and ice.     ROS:  Denies fevers, chills, sweats.  No chest pain, palpitations, lightheadedness.  Denies coughing, wheezing or shortness of breath.  Denies abdominal pain, nausea, diarrhea or constipation.  No new areas of joint pain.  Denies new areas of numbness or weakness.  Denies new anxiety or depression issues.  No new skin problems.    Rehabilitation:       PT:    Bed mobility  Supine to Sit: Minimal assistance  Sit to Supine: Minimal assistance  Scooting: Contact guard assistance  Bed Mobility Comments: head of bed slightly elevated and use of rail         Transfers  Sit to Stand: Minimal Assistance, Moderate Assistance  Stand to Sit: Minimal Assistance, Moderate Assistance  Comment: pt required Roxann to stand from bed but modA from toilet with grab bar         Ambulation  Surface: Level tile  Device: Rolling Walker  Assistance: Contact guard assistance  Gait Deviations: Decreased step height, Decreased step length  Distance: 12 ft  More Ambulation?: Yes  Ambulation 2  Surface - 2: level tile  Device 2: Rolling Walker  Assistance 2: Contact guard assistance  Gait Deviations: Decreased step length, Decreased step height  Distance: 32 ft       OT:             SPEECH:      Objective:  BP (!) 159/65   Pulse (!) 104   Temp 97.9 °F (36.6 °C) (Oral)   Resp 16   Ht 1.6 m (5' 3\")   Wt 73.9 kg (163 lb)   SpO2 95%   BMI 28.87 kg/m²       GEN: well developed, well nourished, NAD  HEENT: NCAT, PERRL, EOMI, mucous membranes pink and moist  CV: RRR, no murmurs, rubs or gallops  PULM: CTAB, no rales or rhonchi. Respirations WNL and unlabored  ABD: soft, NT, ND, BS+ and equal  NEURO: A&O x3. Sensation intact to light touch.   MSK: Functional ROM BUEs and LLE. Weakness and pain

## 2024-08-19 NOTE — PROGRESS NOTES
Spoke to patient regarding the fact that Dr. Gilmore wants the patient to get a suppository since her last BM was 8/15. Patient told me she had 2 Sennas this morning around 6am and wants to wait and see if those will work. I educated the patient on why Dr. Gilmore wanted her to have the suppository and the patient stated that she might be willing to do it tonight, but she does not want to take one and risk family showing up.    Unable to illicit Unable to illicit 2/2 chronic psychiatric condition

## 2024-08-20 PROCEDURE — 97535 SELF CARE MNGMENT TRAINING: CPT

## 2024-08-20 PROCEDURE — 97116 GAIT TRAINING THERAPY: CPT

## 2024-08-20 PROCEDURE — 6370000000 HC RX 637 (ALT 250 FOR IP): Performed by: STUDENT IN AN ORGANIZED HEALTH CARE EDUCATION/TRAINING PROGRAM

## 2024-08-20 PROCEDURE — 97110 THERAPEUTIC EXERCISES: CPT

## 2024-08-20 PROCEDURE — 6370000000 HC RX 637 (ALT 250 FOR IP): Performed by: ORTHOPAEDIC SURGERY

## 2024-08-20 PROCEDURE — 1180000000 HC REHAB R&B

## 2024-08-20 PROCEDURE — 99232 SBSQ HOSP IP/OBS MODERATE 35: CPT | Performed by: PHYSICAL MEDICINE & REHABILITATION

## 2024-08-20 PROCEDURE — 97530 THERAPEUTIC ACTIVITIES: CPT

## 2024-08-20 RX ADMIN — ACETAMINOPHEN 650 MG: 325 TABLET ORAL at 12:55

## 2024-08-20 RX ADMIN — TRAMADOL HYDROCHLORIDE 50 MG: 50 TABLET ORAL at 13:06

## 2024-08-20 RX ADMIN — ACETAMINOPHEN 650 MG: 325 TABLET ORAL at 21:44

## 2024-08-20 RX ADMIN — ACETAMINOPHEN 650 MG: 325 TABLET ORAL at 16:59

## 2024-08-20 RX ADMIN — ACETAMINOPHEN 650 MG: 325 TABLET ORAL at 05:59

## 2024-08-20 RX ADMIN — ASPIRIN 81 MG: 81 TABLET, COATED ORAL at 07:47

## 2024-08-20 RX ADMIN — PANTOPRAZOLE SODIUM 40 MG: 40 TABLET, DELAYED RELEASE ORAL at 05:59

## 2024-08-20 RX ADMIN — TRAMADOL HYDROCHLORIDE 50 MG: 50 TABLET ORAL at 21:45

## 2024-08-20 RX ADMIN — ASPIRIN 81 MG: 81 TABLET, COATED ORAL at 21:44

## 2024-08-20 ASSESSMENT — PAIN DESCRIPTION - LOCATION
LOCATION: LEG;HIP
LOCATION: HIP
LOCATION: HIP
LOCATION: LEG;HIP
LOCATION: LEG

## 2024-08-20 ASSESSMENT — PAIN SCALES - GENERAL
PAINLEVEL_OUTOF10: 2
PAINLEVEL_OUTOF10: 0
PAINLEVEL_OUTOF10: 7
PAINLEVEL_OUTOF10: 4
PAINLEVEL_OUTOF10: 2
PAINLEVEL_OUTOF10: 4
PAINLEVEL_OUTOF10: 2
PAINLEVEL_OUTOF10: 1

## 2024-08-20 ASSESSMENT — PAIN DESCRIPTION - DESCRIPTORS
DESCRIPTORS: ACHING

## 2024-08-20 ASSESSMENT — PAIN DESCRIPTION - PAIN TYPE: TYPE: ACUTE PAIN

## 2024-08-20 ASSESSMENT — PAIN - FUNCTIONAL ASSESSMENT
PAIN_FUNCTIONAL_ASSESSMENT: ACTIVITIES ARE NOT PREVENTED
PAIN_FUNCTIONAL_ASSESSMENT: ACTIVITIES ARE NOT PREVENTED
PAIN_FUNCTIONAL_ASSESSMENT: PREVENTS OR INTERFERES SOME ACTIVE ACTIVITIES AND ADLS
PAIN_FUNCTIONAL_ASSESSMENT: ACTIVITIES ARE NOT PREVENTED

## 2024-08-20 ASSESSMENT — PAIN DESCRIPTION - FREQUENCY: FREQUENCY: INTERMITTENT

## 2024-08-20 ASSESSMENT — PAIN DESCRIPTION - ORIENTATION
ORIENTATION: RIGHT

## 2024-08-20 ASSESSMENT — PAIN DESCRIPTION - ONSET: ONSET: ON-GOING

## 2024-08-20 NOTE — PROGRESS NOTES
Physical Therapy  Facility/Department: [facility] ACUTE REHAB  Rehabilitation Physical Therapy    NAME: Amina Woods  : 1935 (89 y.o.)  MRN: [id number]  CODE STATUS: Full Code    Date of Service: 24      Past Medical History:   Diagnosis Date    Basal cell carcinoma     mulitple    Blood in feces 2016    Hyperlipidemia 2011    Mild hyperglycemia. 2011    Orthostatic hypotension 2011    Osteoarthritis     knees, thumbs    Post-menopausal     Pre-cancerous skin lesions. 2011    Prolonged emergence from general anesthesia     Psoriasis 2011    Syncope, non cardiac     \"vascular syncope\" per patient - last episode was      Past Surgical History:   Procedure Laterality Date    APPENDECTOMY      during [facility]    CATARACT REMOVAL WITH IMPLANT Bilateral     COLON SURGERY      fistula repair- pt not sure    COLONOSCOPY  ,     FOOT SURGERY      [facility]    HYSTERECTOMY (CERVIX STATUS UNKNOWN)      complete, d/t fibroids    JOINT REPLACEMENT      Right and left knees    SIGMOIDOSCOPY      SKIN CANCER DESTRUCTION      basal cell    SKIN CANCER EXCISION      basal cell    TOTAL HIP ARTHROPLASTY Right 2024    HIP TOTAL ARTHROPLASTY ASI performed by Aristeo See MD at [facility] OR       Chart Reviewed: Yes  Patient assessed for rehabilitation services?: Yes  Additional Pertinent Hx: Per Ortho note Patient is a 89 y.o. 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 The patient has failed all types of conservative treatments including physical therapy, NSAIDs, weight loss counseling, and intra-articular steroid injection. The patient's activities of daily living have become significantly restricted. Having failed conservative treatment, the patient has agreed to proceed  with total hip arthroplasty aware of all risks highlighted in the surgical consent form.  Family / Caregiver Present: Yes (3 daughters)  Referring Practitioner: Aristeo See

## 2024-08-20 NOTE — CARE COORDINATION
ARU CASE MANAGEMENT NOTE:    Admission Date:  8/18/2024 Amina Woods is a 89 y.o.  female     Admitted for : Debility [R53.81]     Spoke with patient regarding discharge plan: Patient is alert and oriented x4. Patient would like go home at discharge, states daughter lives next door and can provide assistance. She would like to go to OP therapy at [facility]     Outside appointments while in ARU: 8/22 with Podiatry ~ pt to reschedule. She does have an appointment on 8/28/24 at 10:15am. Per Dr. Gilmore, will readdress in TEAM on Thursday 8/22/24 a discharge on either the 27 th or an early discharge on the 28th so patient is able to attend this appointment.      DME:None ordered today     Will continue to follow for additional discharge needs.    Electronically signed by Maria F Freyer, RN on 8/20/2024 at 1:44 PM

## 2024-08-20 NOTE — PLAN OF CARE
Problem: Discharge Planning  Goal: Discharge to home or other facility with appropriate resources  8/20/2024 1355 by Jovana Hayes RN  Outcome: Progressing     Problem: Skin/Tissue Integrity  Goal: Absence of new skin breakdown  Description: 1.  Monitor for areas of redness and/or skin breakdown  2.  Assess vascular access sites hourly  3.  Every 4-6 hours minimum:  Change oxygen saturation probe site  4.  Every 4-6 hours:  If on nasal continuous positive airway pressure, respiratory therapy assess nares and determine need for appliance change or resting period.  8/20/2024 1355 by Jovana Hayes RN  Outcome: Progressing     Problem: Safety - Adult  Goal: Free from fall injury  8/20/2024 1355 by Jovana Hayes RN  Outcome: Progressing     Problem: ABCDS Injury Assessment  Goal: Absence of physical injury  8/20/2024 1355 by Jovana Hayes RN  Outcome: Progressing     Problem: Pain  Goal: Verbalizes/displays adequate comfort level or baseline comfort level  8/20/2024 1355 by Jovana Hayes RN  Outcome: Progressing

## 2024-08-20 NOTE — PROGRESS NOTES
able to manage bottom care with VC for maintaining precautions.    Toilet Transfers  Technique:  (ambulating with RW)  Equipment: Raised toilet seat without arms;Grab bars  Additional Factors: Verbal cues;Cues for hand placement;Increased time to complete  Assistance Level: Minimal assistance  Skilled Clinical Factors: Min A to stand from toilet. heavy use of GB/RW to stand.    Tub/Shower Transfers  Type: Shower  Transfer From: Rolling walker  Transfer To: Tub transfer bench  Additional Factors: Verbal cues;Cues for hand placement;Increased time to complete  Assistance Level: Minimal assistance  Skilled Clinical Factors: Min A for RLE to clear threashild. VC for sequencing with RW.    Mobility  Supine to Sit  Assistance Level: Minimal assistance  Skilled Clinical Factors: HOB elevated, use of rail, increased time  Scooting  Assistance Level: Minimal assistance  Skilled Clinical Factors: hips to EOB    Sit to Stand  Assistance Level: Minimal assistance  Skilled Clinical Factors: with RW, VC for hand placements  Stand to Sit  Assistance Level: Contact guard assist  Skilled Clinical Factors: CGA for safety. VC for hand placements    Functional Mobility  Device: Rolling walker  Activity: To/From bathroom  Assistance Level: Minimal assistance  Skilled Clinical Factors: Increased time due to pain and stiffness this AM.    OT Exercises  Exercise Treatment: PM: Instruction and participation in BUE strengthening exercises for improved overall strength and activity tolerance needed for safe completion of self-care tasks. Pt tolerated well, completed 1 set x15 reps each in all available planes, utilizing 2# free weight. RB taken as needed throughout. Good technique and pacing following initial demo.    Assessment  Assessment  Activity Tolerance: Patient tolerated treatment well  Discharge Recommendations: Home with assist PRN;Continue to assess pending progress    Patient Education  Education  Education Given To:  needd  Long Term Goal 2: Pt will complete functional transfers/mobility with Mod I, good safety, and use of AE/DME/Modified techniques as needed  Long Term Goal 3: Pt will tolerate dynamic standing 12+ minutes with Mod I during self-care/functional activity for improved balance/activity tolerance  Long Term Goal 4: Pt will complete self-care with improved fine motor coordination AEB 10 second improvement on 9HPT  Long Term Goal 5: Pt will complete self-care with improved  strength AEB 8# improvement using hand dynamometer  Long Term Goal 6: Pt will complete light housekeeping/simple meal prep with Mod I, good safety, and use of least restrictive device for improved participation in ADLs/IADLs  Long Term Goal 7: Pt will verbalize/demonstrate good understanding of home safety/fall prevention strategies for improved safety/independence with self-care    Plan  Occupational Therapy Plan  Times Per Week: 5-7  Times Per Day: Twice a day  Current Treatment Recommendations: Self-Care / ADL, Strengthening, ROM, Balance training, Functional mobility training, Endurance training, Pain management, Safety education & training, Patient/Caregiver education & training, Equipment evaluation, education, & procurement, Positioning, Home management training, Coordination training       08/20/24 0800 08/20/24 1500   OT Individual Minutes   Time In 0800 1333   Time Out 0903 1400   Minutes 63 27         Electronically signed by HALLEY Gifford on 8/20/24 at 3:51 PM EDT

## 2024-08-20 NOTE — PROGRESS NOTES
08/20/24 1100   Encounter Summary   Encounter Overview/Reason Attempted Encounter   Service Provided For Patient not available  (Patient in therapy.)   Referral/Consult From Rounding   Last Encounter  08/20/24   Assessment/Intervention/Outcome   Assessment Unable to assess  (Patient unavailable.)

## 2024-08-20 NOTE — PROGRESS NOTES
Physical Medicine & Rehabilitation  Progress Note      Subjective:      89 year-old female with R hip OA treated by JORJE. Patient is observed in physical therapy today. She reports mild aching pain which she rates 3/10. No new issues with sleep, appetite, bowel, or bladder.     ROS:  Denies fevers, chills, sweats.  No chest pain, palpitations, lightheadedness.  Denies coughing, wheezing or shortness of breath.  Denies abdominal pain, nausea, diarrhea or constipation.  No new areas of joint pain.  Denies new areas of numbness or weakness.  Denies new anxiety or depression issues.  No new skin problems.    Rehabilitation:       PT:    Bed mobility  Rolling to Left: Moderate assistance  Supine to Sit: Moderate assistance  Sit to Supine: Moderate assistance  Scooting: Moderate assistance  Bed Mobility Comments: HOB slightly elevated. Use of bed rail         Transfers  Sit to Stand: Moderate Assistance  Stand to Sit: Moderate Assistance  Bed to Chair: Moderate assistance  Comment: pt required Roxann to stand from bed but modA from toilet with grab bar         Ambulation  Surface: Level tile  Device: Rolling Walker  Assistance: Minimal assistance  Quality of Gait: decreased stance time RLE, small steps  Gait Deviations: Decreased step length, Decreased step height, Shuffles, Slow Tiesha  Distance: 25 feet x 2; 35 feet x 1; pm 20 feet with increased pain reported in lateral thigh.  Comments: Cues and increased time. 3rd assist to guide feet to commode initially. Pt reports \"my legs feel so heavy like I can't move them\"  More Ambulation?: Yes  Ambulation 2  Surface - 2: level tile  Device 2: Rolling Walker  Assistance 2: Contact guard assistance  Gait Deviations: Decreased step length, Decreased step height  Distance: 32 ft       OT:  Grooming/Oral Hygiene  Assistance Level: Stand by assist  Skilled Clinical Factors: Standing at sink for oral care.  Toileting  Assistance Level: Minimal assistance  Skilled Clinical Factors: A

## 2024-08-20 NOTE — PLAN OF CARE
Problem: Discharge Planning  Goal: Discharge to home or other facility with appropriate resources  8/20/2024 0141 by Glenna Bianchi RN  Outcome: Progressing  8/19/2024 1745 by Ibis Zaragoza RN  Outcome: Progressing  Flowsheets (Taken 8/19/2024 1040)  Discharge to home or other facility with appropriate resources:   Identify barriers to discharge with patient and caregiver   Arrange for needed discharge resources and transportation as appropriate     Problem: Skin/Tissue Integrity  Goal: Absence of new skin breakdown  Description: 1.  Monitor for areas of redness and/or skin breakdown  2.  Assess vascular access sites hourly  3.  Every 4-6 hours minimum:  Change oxygen saturation probe site  4.  Every 4-6 hours:  If on nasal continuous positive airway pressure, respiratory therapy assess nares and determine need for appliance change or resting period.  8/20/2024 0141 by Glenna Bianchi RN  Outcome: Progressing  8/19/2024 1745 by Ibis Zaragoza RN  Outcome: Progressing  Note: Monitoring for skin breakdown     Problem: Safety - Adult  Goal: Free from fall injury  8/20/2024 0141 by Glenna Bianchi RN  Outcome: Progressing  8/19/2024 1745 by Ibis Zaragoza RN  Outcome: Progressing  Flowsheets (Taken 8/19/2024 1040)  Free From Fall Injury: Instruct family/caregiver on patient safety     Problem: ABCDS Injury Assessment  Goal: Absence of physical injury  8/20/2024 0141 by Glenna Bianchi RN  Outcome: Progressing  8/19/2024 1745 by Ibis Zaragoza RN  Outcome: Progressing  Flowsheets (Taken 8/19/2024 1745)  Absence of Physical Injury: Implement safety measures based on patient assessment     Problem: Pain  Goal: Verbalizes/displays adequate comfort level or baseline comfort level  8/20/2024 0141 by Glenna Bianchi RN  Outcome: Progressing  8/19/2024 1745 by Ibis Zaragoza RN  Outcome: Progressing  Flowsheets (Taken 8/19/2024 1745)  Verbalizes/displays adequate comfort level or baseline comfort level: Encourage

## 2024-08-21 PROCEDURE — 97530 THERAPEUTIC ACTIVITIES: CPT

## 2024-08-21 PROCEDURE — 97110 THERAPEUTIC EXERCISES: CPT

## 2024-08-21 PROCEDURE — 99223 1ST HOSP IP/OBS HIGH 75: CPT | Performed by: INTERNAL MEDICINE

## 2024-08-21 PROCEDURE — 6370000000 HC RX 637 (ALT 250 FOR IP): Performed by: STUDENT IN AN ORGANIZED HEALTH CARE EDUCATION/TRAINING PROGRAM

## 2024-08-21 PROCEDURE — 1180000000 HC REHAB R&B

## 2024-08-21 PROCEDURE — 97116 GAIT TRAINING THERAPY: CPT

## 2024-08-21 PROCEDURE — 97535 SELF CARE MNGMENT TRAINING: CPT

## 2024-08-21 PROCEDURE — 99232 SBSQ HOSP IP/OBS MODERATE 35: CPT | Performed by: PHYSICAL MEDICINE & REHABILITATION

## 2024-08-21 PROCEDURE — 6370000000 HC RX 637 (ALT 250 FOR IP): Performed by: ORTHOPAEDIC SURGERY

## 2024-08-21 RX ORDER — LANOLIN ALCOHOL/MO/W.PET/CERES
1000 CREAM (GRAM) TOPICAL DAILY
Status: DISCONTINUED | OUTPATIENT
Start: 2024-08-22 | End: 2024-08-30 | Stop reason: HOSPADM

## 2024-08-21 RX ADMIN — ACETAMINOPHEN 650 MG: 325 TABLET ORAL at 11:52

## 2024-08-21 RX ADMIN — ACETAMINOPHEN 650 MG: 325 TABLET ORAL at 16:20

## 2024-08-21 RX ADMIN — TRAMADOL HYDROCHLORIDE 50 MG: 50 TABLET ORAL at 10:15

## 2024-08-21 RX ADMIN — TRAMADOL HYDROCHLORIDE 50 MG: 50 TABLET ORAL at 21:32

## 2024-08-21 RX ADMIN — ACETAMINOPHEN 650 MG: 325 TABLET ORAL at 21:37

## 2024-08-21 RX ADMIN — PANTOPRAZOLE SODIUM 40 MG: 40 TABLET, DELAYED RELEASE ORAL at 06:35

## 2024-08-21 RX ADMIN — ASPIRIN 81 MG: 81 TABLET, COATED ORAL at 21:27

## 2024-08-21 RX ADMIN — ACETAMINOPHEN 650 MG: 325 TABLET ORAL at 06:35

## 2024-08-21 RX ADMIN — ASPIRIN 81 MG: 81 TABLET, COATED ORAL at 07:53

## 2024-08-21 ASSESSMENT — PAIN SCALES - GENERAL
PAINLEVEL_OUTOF10: 5
PAINLEVEL_OUTOF10: 2
PAINLEVEL_OUTOF10: 3
PAINLEVEL_OUTOF10: 0
PAINLEVEL_OUTOF10: 7
PAINLEVEL_OUTOF10: 2
PAINLEVEL_OUTOF10: 2
PAINLEVEL_OUTOF10: 0

## 2024-08-21 ASSESSMENT — PAIN DESCRIPTION - ORIENTATION
ORIENTATION: RIGHT

## 2024-08-21 ASSESSMENT — PAIN - FUNCTIONAL ASSESSMENT
PAIN_FUNCTIONAL_ASSESSMENT: ACTIVITIES ARE NOT PREVENTED

## 2024-08-21 ASSESSMENT — PAIN DESCRIPTION - LOCATION
LOCATION: HIP

## 2024-08-21 ASSESSMENT — PAIN DESCRIPTION - DESCRIPTORS
DESCRIPTORS: ACHING
DESCRIPTORS: ACHING
DESCRIPTORS: DULL
DESCRIPTORS: ACHING

## 2024-08-21 NOTE — PROGRESS NOTES
Physical Therapy  Facility/Department: [facility] ACUTE REHAB  Rehabilitation Physical Therapy    NAME: Amina Woods  : 1935 (89 y.o.)  MRN: [id number]  CODE STATUS: Full Code    Date of Service: 24      Past Medical History:   Diagnosis Date    Basal cell carcinoma     mulitple    Blood in feces 2016    Hyperlipidemia 2011    Mild hyperglycemia. 2011    Orthostatic hypotension 2011    Osteoarthritis     knees, thumbs    Post-menopausal     Pre-cancerous skin lesions. 2011    Prolonged emergence from general anesthesia     Psoriasis 2011    Syncope, non cardiac     \"vascular syncope\" per patient - last episode was      Past Surgical History:   Procedure Laterality Date    APPENDECTOMY      during [facility]    CATARACT REMOVAL WITH IMPLANT Bilateral     COLON SURGERY      fistula repair- pt not sure    COLONOSCOPY  ,     FOOT SURGERY      [facility]    HYSTERECTOMY (CERVIX STATUS UNKNOWN)      complete, d/t fibroids    JOINT REPLACEMENT      Right and left knees    SIGMOIDOSCOPY      SKIN CANCER DESTRUCTION      basal cell    SKIN CANCER EXCISION      basal cell    TOTAL HIP ARTHROPLASTY Right 2024    HIP TOTAL ARTHROPLASTY ASI performed by Aristeo See MD at [facility] OR       Chart Reviewed: Yes  Additional Pertinent Hx: Per Ortho note Patient is a 89 y.o. 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 The patient has failed all types of conservative treatments including physical therapy, NSAIDs, weight loss counseling, and intra-articular steroid injection. The patient's activities of daily living have become significantly restricted. Having failed conservative treatment, the patient has agreed to proceed  with total hip arthroplasty aware of all risks highlighted in the surgical consent form.  Diagnosis: DJD of the right hip  General Comment  Comments: Pt sitting in bedside recliner upon arrival and upon
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
## 2024-08-21 NOTE — PROGRESS NOTES
Physical Medicine & Rehabilitation  Progress Note      Subjective:      89 year-old female with R hip OA treated by JORJE. Patient is observed ambulating with physical therapy today. She reports pain continues to be mild at 3/10 and is responding to medications. She has begun using prn Tramadol and had improved sleep last night per nursing. She notes improvement in her R leg edema overnight as well. She continues to progress with therapies.    ROS:  Denies fevers, chills, sweats.  No chest pain, palpitations, lightheadedness.  Denies coughing, wheezing or shortness of breath.  Denies abdominal pain, nausea, diarrhea or constipation.  No new areas of joint pain.  Denies new areas of numbness or weakness.  Denies new anxiety or depression issues.  No new skin problems.    Rehabilitation:       PT:    Bed mobility  Rolling to Left: Minimal assistance  Supine to Sit: Moderate assistance  Sit to Supine: Moderate assistance  Scooting: Moderate assistance  Bed Mobility Comments: HOB slightly elevated. Use of bed rail         Transfers  Sit to Stand: Minimal Assistance  Stand to Sit: Minimal Assistance  Bed to Chair: Minimal assistance  Comment: pt required Roxann to stand from bed but modA from toilet with grab bar         Ambulation  Surface: Level tile  Device: Rolling Walker  Assistance: Contact guard assistance, Minimal assistance  Quality of Gait: decreased stance time RLE, small steps  Gait Deviations: Decreased step length, Decreased step height, Shuffles, Slow Tiesha  Distance: 60 feet, 20 feet x 4; 35 feet  Comments: Cues and increased time. 3rd assist to guide feet to commode initially. Pt reports \"my legs feel so heavy like I can't move them\"  More Ambulation?: Yes  Ambulation 2  Surface - 2: level tile  Device 2: Rolling Walker  Assistance 2: Contact guard assistance  Gait Deviations: Decreased step length, Decreased step height  Distance: 32 ft       OT:  Grooming/Oral Hygiene  Assistance Level: Stand by  assist  Skilled Clinical Factors: Standing at sink for oral care.  Upper Extremity Bathing  Assistance Level: Supervision  Skilled Clinical Factors: Completes sitting on TTB  Lower Extremity Bathing  Equipment Provided: Long-handled sponge  Assistance Level: Minimal assistance  Skilled Clinical Factors: Pt completes sitting on TTB. using long handled spong for B feet to maintain precautions. A req for washing buttocks while standing at GB.  Upper Extremity Dressing  Assistance Level: Supervision  Skilled Clinical Factors: Completes seated on TTB.  Lower Extremity Dressing  Assistance Level: Maximum assistance  Skilled Clinical Factors: Writer chau use of reacher for threading, pt requires A for threading RLE, Once standing A required to pull over hips.  Putting On/Taking Off Footwear  Assistance Level: Moderate assistance  Skilled Clinical Factors: TA for TEDs, pt reports nly wearing certain salandal and shoes. A req for doffing onning R sandal.  Toileting  Assistance Level: Minimal assistance  Skilled Clinical Factors: A req for pulling pants down, pt able to manage bottom care with VC for maintaining precautions.   Toilet Transfers  Technique:  (ambulating with RW)  Equipment: Raised toilet seat without arms, Grab bars  Additional Factors: Verbal cues, Cues for hand placement, Increased time to complete  Assistance Level: Minimal assistance  Skilled Clinical Factors: Min A to stand from toilet. heavy use of GB/RW to stand.      SPEECH:      Objective:  BP (!) 108/43   Pulse 86   Temp 98.1 °F (36.7 °C) (Oral)   Resp 16   Ht 1.6 m (5' 3\")   Wt 73.9 kg (163 lb)   SpO2 94%   BMI 28.87 kg/m²       GEN: well developed, well nourished, NAD  HEENT: NCAT, PERRL, EOMI, mucous membranes pink and moist  CV: RRR, no murmurs, rubs or gallops  PULM: CTAB, no rales or rhonchi. Respirations WNL and unlabored  ABD: soft, NT, ND, BS+ and equal  NEURO: A&O x3. Sensation intact to light touch.   MSK: Functional ROM BUEs and

## 2024-08-21 NOTE — CARE COORDINATION
ARU CASE MANAGEMENT NOTE:    Admission Date:  8/18/2024 Amina Woods is a 89 y.o.  female    Admitted for : Debility [R53.81]    Admitted for : Debility [R53.81]     Spoke with patient regarding discharge plan: Patient is alert and oriented x4. Patient would like go home at discharge, states daughter lives next door and can provide assistance. She would like to go to OP therapy at [facility]     Outside appointments while in ARU: 8/22 with Podiatry ~appointment was cancelled.     She does have an appointment on 8/28/24 at 10:15am. Per Dr. Gilmore, will address in TEAM on Thursday 8/22/24,  to discharge on either the 27th or an early discharge on the 28th  this way patient is able to attend this appointment.      DME:None ordered today     Will continue to follow for additional discharge needs.    Electronically signed by Maria F Freyer, RN on 8/21/2024 at 11:47 AM

## 2024-08-21 NOTE — PROGRESS NOTES
[place].    Toileting  Assistance Level: Minimal assistance  Skilled Clinical Factors: Pt was able to manage pull up down with Roxann and increased time. Pt completed dorcas hygiene and hygiene after BM while seated weight shifting.    Toilet Transfers  Technique:  (Ambulating with rw.)  Equipment: Standard toilet;Grab bars  Assistance Level: Minimal assistance  Skilled Clinical Factors: Min A to stand from toilet. Heavy use of GB/RW to stand.    Mobility     Sit to Stand  Assistance Level: Minimal assistance  Skilled Clinical Factors: with RW, VC for hand placements  Stand to Sit  Assistance Level: Contact guard assist  Skilled Clinical Factors: CGA for safety. VC for hand placements    Functional Mobility  Device: Rolling walker  Activity: To/From bathroom  Assistance Level: Minimal assistance  Skilled Clinical Factors: Increased time due to pain and stiffness this AM.    OT Exercises  Exercise Treatment: PM: Instruction and participation in BUE strengthening exercises for improved overall strength and activity tolerance needed for safe completion of self-care tasks. Pt tolerated well, completed 1 set x15 reps each in all available planes, utilizing 2# free weight. RB taken as needed throughout. Good technique and pacing following initial demo.    Assessment  Assessment  Activity Tolerance: Patient tolerated treatment well  Discharge Recommendations: Home with assist PRN;Continue to assess pending progress    Patient Education  Education  Education Given To: Patient  Education Provided: Role of Therapy;Plan of Care;Precautions;Safety;ADL Function;Transfer Training;Energy Conservation;Fall Prevention Strategies;Equipment;DME/Home Modifications  Education Method: Verbal;Demonstration;Teach Back  Barriers to Learning: None  Education Outcome: Verbalized understanding;Demonstrated understanding;Continued education needed    OT Equipment Recommendations  Other: Recommend shower chair, pt reports having walk in with grab bars,  improvement using hand dynamometer  Long Term Goal 6: Pt will complete light housekeeping/simple meal prep with Mod I, good safety, and use of least restrictive device for improved participation in ADLs/IADLs  Long Term Goal 7: Pt will verbalize/demonstrate good understanding of home safety/fall prevention strategies for improved safety/independence with self-care    Plan  Occupational Therapy Plan  Times Per Week: 5-7  Times Per Day: Twice a day  Current Treatment Recommendations: Self-Care / ADL, Strengthening, ROM, Balance training, Functional mobility training, Endurance training, Pain management, Safety education & training, Patient/Caregiver education & training, Equipment evaluation, education, & procurement, Positioning, Home management training, Coordination training       08/21/24 0727 08/21/24 1559   OT Individual Minutes   Time In 0802 1505   Time Out 0902 1535   Minutes 60 30       Electronically signed by HALLEY Segura on 8/21/24 at 4:39 PM EDT

## 2024-08-21 NOTE — CONSULTS
[facility]   IN-PATIENT SERVICE   [facility]    HISTORY AND PHYSICAL EXAMINATION            Date:   8/21/2024  Patient name:  Amina Woods  Date of admission:  8/18/2024 11:59 AM  MRN:   [id number]  Account:  [id number]  YOB: 1935  PCP:    Paradise Estrella MD  Room:   [address] [place] [address]  Code Status:    Full Code    Chief Complaint:     Medical management    History Obtained From:     patient    History of Present Illness:     The patient is a 89 y.o.  Non- / non  female who presents with No chief complaint on file.   and she is admitted to the hospital for the management of medical issues.    89-year-old female with history of orthostatic hypotension, psoriasis, vascular syncope, initially presented for surgical intervention of the right hip osteoarthritis, underwent total hip arthroplasty on 8/13/2024.  Rapid response was called on 8/14/2024 as she was unresponsive, never lost a pulse.  She was noted to be bradycardic and hypotensive at that time.  When seen today the patient denies any acute concerns, hip pain is improving.    Past Medical History:     Past Medical History:   Diagnosis Date    Basal cell carcinoma     mulitple    Blood in feces 09/19/2016    Hyperlipidemia 08/30/2011    Mild hyperglycemia. 08/30/2011    Orthostatic hypotension 08/30/2011    Osteoarthritis     knees, thumbs    Post-menopausal     Pre-cancerous skin lesions. 08/30/2011    Prolonged emergence from general anesthesia     Psoriasis 08/30/2011    Syncope, non cardiac     \"vascular syncope\" per patient - last episode was 2023        Past Surgical History:     Past Surgical History:   Procedure Laterality Date    APPENDECTOMY      during hyst    CATARACT REMOVAL WITH IMPLANT Bilateral     COLON SURGERY      fistula repair- pt not sure    COLONOSCOPY  1997, 2002    FOOT SURGERY      [facility]    HYSTERECTOMY (CERVIX STATUS UNKNOWN)      complete, d/t fibroids    JOINT REPLACEMENT     Problem Relation Age of Onset    Breast Cancer Mother     Heart Disease Father         angina    Cancer Sister         melanoma    Diabetes Sister     Diabetes Sister     Cancer Brother         kidney    Cancer Brother         colon    Diabetes Maternal Grandmother     Prostate Cancer Maternal Grandfather     Cancer Maternal Aunt         melanoma    Cancer Other         ovarian       Review of Systems:     Positive and Negative as described in HPI.    CONSTITUTIONAL:  negative for fevers, chills, sweats, fatigue, weight loss  HEENT:  negative for vision, hearing changes, runny nose, throat pain  RESPIRATORY:  negative for shortness of breath, cough, congestion, wheezing.  CARDIOVASCULAR:  negative for chest pain, palpitations.  GASTROINTESTINAL:  negative for nausea, vomiting, diarrhea, constipation, change in bowel habits, abdominal pain   GENITOURINARY:  negative for difficulty of urination, burning with urination, frequency   INTEGUMENT:  negative for rash, skin lesions, easy bruising   HEMATOLOGIC/LYMPHATIC:  negative for swelling/edema   ALLERGIC/IMMUNOLOGIC:  negative for urticaria , itching  ENDOCRINE:  negative increase in drinking, increase in urination, hot or cold intolerance  MUSCULOSKELETAL: Hip pain improving  NEUROLOGICAL:  negative for headaches, dizziness, lightheadedness, numbness, pain, tingling extremities      Physical Exam:   BP (!) 108/43   Pulse 86   Temp 98.1 °F (36.7 °C) (Oral)   Resp 16   Ht 1.6 m (5' 3\")   Wt 73.9 kg (163 lb)   SpO2 94%   BMI 28.87 kg/m²   Temp (24hrs), Av °F (36.7 °C), Min:97.9 °F (36.6 °C), Max:98.1 °F (36.7 °C)    No results for input(s): \"POCGLU\" in the last 72 hours.  No intake or output data in the 24 hours ending 24 1550    General Appearance:  alert, well appearing, and in no acute distress  Mental status: oriented to person, place, and time with normal affect  Head:  normocephalic, atraumatic.  Eye: no icterus, redness, pupils equal and

## 2024-08-21 NOTE — PLAN OF CARE
Problem: Discharge Planning  Goal: Discharge to home or other facility with appropriate resources  8/21/2024 1425 by Joslyn Woodruff LPN  Outcome: Progressing     Problem: Skin/Tissue Integrity  Goal: Absence of new skin breakdown  Description: 1.  Monitor for areas of redness and/or skin breakdown  2.  Assess vascular access sites hourly  3.  Every 4-6 hours minimum:  Change oxygen saturation probe site  4.  Every 4-6 hours:  If on nasal continuous positive airway pressure, respiratory therapy assess nares and determine need for appliance change or resting period.  8/21/2024 1425 by Joslyn Woodruff LPN  Outcome: Progressing     Problem: Safety - Adult  Goal: Free from fall injury  8/21/2024 1425 by Joslyn Woodruff LPN  Outcome: Progressing     Problem: ABCDS Injury Assessment  Goal: Absence of physical injury  8/21/2024 1425 by Joslyn Woodruff LPN  Outcome: Progressing     Problem: Pain  Goal: Verbalizes/displays adequate comfort level or baseline comfort level  8/21/2024 1425 by Joslyn Woodruff LPN  Outcome: Progressing     Problem: Nutrition Deficit:  Goal: Optimize nutritional status  8/21/2024 1425 by Joslyn Woodruff LPN  Outcome: Progressing

## 2024-08-22 ENCOUNTER — TELEPHONE (OUTPATIENT)
Dept: ORTHOPEDIC SURGERY | Age: 89
End: 2024-08-22

## 2024-08-22 PROCEDURE — 99232 SBSQ HOSP IP/OBS MODERATE 35: CPT | Performed by: PHYSICAL MEDICINE & REHABILITATION

## 2024-08-22 PROCEDURE — 99232 SBSQ HOSP IP/OBS MODERATE 35: CPT | Performed by: INTERNAL MEDICINE

## 2024-08-22 PROCEDURE — 6370000000 HC RX 637 (ALT 250 FOR IP): Performed by: STUDENT IN AN ORGANIZED HEALTH CARE EDUCATION/TRAINING PROGRAM

## 2024-08-22 PROCEDURE — 1180000000 HC REHAB R&B

## 2024-08-22 PROCEDURE — 6370000000 HC RX 637 (ALT 250 FOR IP): Performed by: INTERNAL MEDICINE

## 2024-08-22 PROCEDURE — 97110 THERAPEUTIC EXERCISES: CPT

## 2024-08-22 PROCEDURE — 6370000000 HC RX 637 (ALT 250 FOR IP): Performed by: ORTHOPAEDIC SURGERY

## 2024-08-22 PROCEDURE — 97530 THERAPEUTIC ACTIVITIES: CPT

## 2024-08-22 PROCEDURE — 97116 GAIT TRAINING THERAPY: CPT

## 2024-08-22 PROCEDURE — 97535 SELF CARE MNGMENT TRAINING: CPT

## 2024-08-22 RX ORDER — LANOLIN ALCOHOL/MO/W.PET/CERES
6 CREAM (GRAM) TOPICAL NIGHTLY PRN
Status: DISCONTINUED | OUTPATIENT
Start: 2024-08-22 | End: 2024-08-30 | Stop reason: HOSPADM

## 2024-08-22 RX ORDER — FERROUS SULFATE 325(65) MG
325 TABLET ORAL 2 TIMES DAILY WITH MEALS
Status: DISCONTINUED | OUTPATIENT
Start: 2024-08-22 | End: 2024-08-30 | Stop reason: HOSPADM

## 2024-08-22 RX ADMIN — ASPIRIN 81 MG: 81 TABLET, COATED ORAL at 20:00

## 2024-08-22 RX ADMIN — TRAMADOL HYDROCHLORIDE 50 MG: 50 TABLET ORAL at 19:59

## 2024-08-22 RX ADMIN — ACETAMINOPHEN 650 MG: 325 TABLET ORAL at 17:02

## 2024-08-22 RX ADMIN — CYANOCOBALAMIN TAB 1000 MCG 1000 MCG: 1000 TAB at 07:28

## 2024-08-22 RX ADMIN — TRAMADOL HYDROCHLORIDE 50 MG: 50 TABLET ORAL at 07:31

## 2024-08-22 RX ADMIN — ACETAMINOPHEN 650 MG: 325 TABLET ORAL at 11:56

## 2024-08-22 RX ADMIN — ACETAMINOPHEN 650 MG: 325 TABLET ORAL at 23:13

## 2024-08-22 RX ADMIN — PANTOPRAZOLE SODIUM 40 MG: 40 TABLET, DELAYED RELEASE ORAL at 05:18

## 2024-08-22 RX ADMIN — ASPIRIN 81 MG: 81 TABLET, COATED ORAL at 07:28

## 2024-08-22 RX ADMIN — ACETAMINOPHEN 650 MG: 325 TABLET ORAL at 05:18

## 2024-08-22 RX ADMIN — FERROUS SULFATE TAB 325 MG (65 MG ELEMENTAL FE) 325 MG: 325 (65 FE) TAB at 17:03

## 2024-08-22 ASSESSMENT — PAIN - FUNCTIONAL ASSESSMENT
PAIN_FUNCTIONAL_ASSESSMENT: ACTIVITIES ARE NOT PREVENTED

## 2024-08-22 ASSESSMENT — PAIN DESCRIPTION - LOCATION
LOCATION: HIP
LOCATION: LEG;HIP

## 2024-08-22 ASSESSMENT — PAIN DESCRIPTION - ORIENTATION
ORIENTATION: RIGHT
ORIENTATION: RIGHT

## 2024-08-22 ASSESSMENT — PAIN DESCRIPTION - DESCRIPTORS
DESCRIPTORS: DULL
DESCRIPTORS: ACHING

## 2024-08-22 ASSESSMENT — PAIN SCALES - GENERAL
PAINLEVEL_OUTOF10: 0
PAINLEVEL_OUTOF10: 1
PAINLEVEL_OUTOF10: 7
PAINLEVEL_OUTOF10: 0

## 2024-08-22 NOTE — PLAN OF CARE
Problem: Discharge Planning  Goal: Discharge to home or other facility with appropriate resources  8/22/2024 1437 by Joslyn Woodruff LPN  Outcome: Progressing     Problem: Skin/Tissue Integrity  Goal: Absence of new skin breakdown  Description: 1.  Monitor for areas of redness and/or skin breakdown  2.  Assess vascular access sites hourly  3.  Every 4-6 hours minimum:  Change oxygen saturation probe site  4.  Every 4-6 hours:  If on nasal continuous positive airway pressure, respiratory therapy assess nares and determine need for appliance change or resting period.  8/22/2024 1437 by Joslyn Woodruff LPN  Outcome: Progressing     Problem: Safety - Adult  Goal: Free from fall injury  8/22/2024 1437 by Joslyn Woodruff LPN  Outcome: Progressing     Problem: ABCDS Injury Assessment  Goal: Absence of physical injury  8/22/2024 1437 by Joslyn Woodruff LPN  Outcome: Progressing     Problem: Pain  Goal: Verbalizes/displays adequate comfort level or baseline comfort level  8/22/2024 1437 by Joslyn Woodruff LPN  Outcome: Progressing     Problem: Nutrition Deficit:  Goal: Optimize nutritional status  Outcome: Progressing

## 2024-08-22 NOTE — PROGRESS NOTES
reactive, extraocular eye movements intact, conjunctiva clear  Ear: normal external ear, no discharge, hearing intact  Nose:  no drainage noted  Mouth: mucous membranes moist  Neck: supple, no carotid bruits, thyroid not palpable  Lungs: Bilateral equal air entry, clear to ausculation, no wheezing, rales or rhonchi, normal effort  Cardiovascular: normal rate, regular rhythm, no murmur, gallop, rub.  Abdomen: Soft, nontender, nondistended, normal bowel sounds, no hepatomegaly or splenomegaly  Neurologic: There are no new focal motor or sensory deficits, normal muscle tone and bulk, no abnormal sensation, normal speech, cranial nerves II through XII grossly intact. Normal coordination and DTR  Skin: No concerns for infection at surgical site  Extremities:  peripheral pulses palpable, no pedal edema or calf pain with palpation      Investigations:      Laboratory Testing:  No results found for this or any previous visit (from the past 24 hour(s)).    Imaging/Diagnostics:    Reviewed    Assessment :      Primary Problem  S/P hip replacement, right    Active Hospital Problems    Diagnosis Date Noted    Debility [R53.81] 08/18/2024    S/P hip replacement, right [Z96.641] 08/18/2024    Hypotension [I95.9] 08/30/2011       Plan:     Patient status Admit as inpatient in the rehab    Right hip osteoarthritis s/p total hip replacement on 8/13/2024.  On aspirin 81 mg p.o. twice daily per Ortho service  Recent bradycardia and hypotension, has improved.  Blood pressure is acceptable.  Recent TSH within normal limits  Normocytic normochromic anemia noted on blood work 8/19/2024, likely due to intraoperative losses.  However did not have blood work performed prior to procedure, will recheck CBC, iron studies B12 and folic acid levels on Monday.  Hemoglobin has been stable thus far  History of low normal B12 levels, begin replacement and recheck levels    8/22  Patient seen and examined  Vitals have been stable pain controlled  patient progressing with PT OT  Hemoglobin 8.1, will start iron supplements, iron studies ordered  Denies any chest pain shortness of breath  Consultations:   IP CONSULT TO DIETITIAN  IP CONSULT TO SOCIAL WORK  IP CONSULT TO INTERNAL MEDICINE  IP CONSULT TO CARDIOLOGY     Patient is admitted as inpatient status because of co-morbidities listed above, severity of signs and symptoms as outlined, requirement for current medical therapies and most importantly because of direct risk to patient if care not provided in a hospital setting.    Stefania Brown MD  8/22/2024  3:10 PM    Copy sent to Dr. Estrella, Paradise Pineda MD    Please note that this chart was generated using voice recognition Dragon dictation software.  Although every effort was made to ensure the accuracy of this automated transcription, some errors in transcription may have occurred.

## 2024-08-22 NOTE — CARE COORDINATION
ARU CASE MANAGEMENT NOTE:    Admission Date:  8/18/2024 Amina Woods is a 89 y.o.  female    Admitted for : Debility [R53.81]    Patient is alert and oriented x4.    Spoke with patient regarding discharge plan: plan remains to do outpatient therapy at the [facility] location. Discussed the order for a shower chair - advised that Medicare will not cover. Patient states her family would purchase.     Outside appointments while in ARU: 8/28 - [facility] - 1015. Called and left message with Dr. See's nurse to see if he would be willing to see the patient while she was on the unit. Awaiting call back.    DME:[facility] - pt to purchase    Will continue to follow for additional discharge needs.    Electronically signed by Rabia Hatch RN on 8/22/2024 at 2:54 PM

## 2024-08-22 NOTE — PLAN OF CARE
Problem: Discharge Planning  Goal: Discharge to home or other facility with appropriate resources  8/22/2024 0127 by Jessenia Gonzalez, RN  Outcome: Progressing     Problem: Skin/Tissue Integrity  Goal: Absence of new skin breakdown  Description: 1.  Monitor for areas of redness and/or skin breakdown  2.  Assess vascular access sites hourly  3.  Every 4-6 hours minimum:  Change oxygen saturation probe site  4.  Every 4-6 hours:  If on nasal continuous positive airway pressure, respiratory therapy assess nares and determine need for appliance change or resting period.  8/22/2024 0127 by Jessenia Gonzalez, RN  Outcome: Progressing     Problem: Safety - Adult  Goal: Free from fall injury  8/22/2024 0127 by Jessenia Gonzalez, RN  Outcome: Progressing     Problem: ABCDS Injury Assessment  Goal: Absence of physical injury  8/22/2024 0127 by Jessenia Gonzalez, RN  Outcome: Progressing     Problem: Pain  Goal: Verbalizes/displays adequate comfort level or baseline comfort level  8/22/2024 0127 by Jessenia Gonzalez, RN  Outcome: Progressing

## 2024-08-22 NOTE — TELEPHONE ENCOUNTER
[facility] acute rehab unit called to see if  would be willing to see patient there on rehab unit rather than patient coming into office for appointment  Please return call [phone]  Thank you

## 2024-08-22 NOTE — PROGRESS NOTES
[facility]   Acute Rehabilitation Occupational Therapy Daily Treatment Note    Date: 24  Patient Name: Amina Woods       Room: 2609/2609-01  MRN: [id number]  Account: [id number]   : 1935  (89 y.o.) Gender: female       Referring Practitioner: Meghan Mcmahon MD  Diagnosis: S/P hip replacement, right  Additional Pertinent Hx: Per H&P: Amina Woods is a 89 y.o. female with history of psoriasis, HLD admitted to [facility] on 2024.  She was originally admitted to [facility] on 24.     She initially presented for surgical intervention for right hip osteoarthritis.  She underwent total hip arthroplasty on 24 (Dr. See).  She is WBAT to the right lower limb.  Rapid response/code blue was called on 24 for unresponsiveness but she never lost a pulse.  She was noted to have bradycardia and hypotension at that time.     She is currently requiring assistance for self-care activities and mobility prompting this admission.  She reports ongoing pain in the right lower limb.  She also notes right lower limb edema and constipation.  She denies any chest pain, palpitations, and shortness of breath    Treatment Diagnosis: Impaired self-care status    Past Medical History:  has a past medical history of Basal cell carcinoma, Blood in feces, Hyperlipidemia, Mild hyperglycemia., Orthostatic hypotension, Osteoarthritis, Post-menopausal, Pre-cancerous skin lesions., Prolonged emergence from general anesthesia, Psoriasis, and Syncope, non cardiac.    Past Surgical History:   has a past surgical history that includes Hysterectomy; Colon surgery; Colonoscopy (, ); joint replacement (); Foot surgery; Appendectomy; Skin cancer excision; Skin cancer destruction; sigmoidoscopy (); Cataract removal with implant (Bilateral); and Total hip arthroplasty (Right, 2024).    Restrictions  Restrictions/Precautions  Restrictions/Precautions:

## 2024-08-22 NOTE — PROGRESS NOTES
stand.      SPEECH:      Objective:  BP (!) 126/53   Pulse 85   Temp 98.2 °F (36.8 °C) (Oral)   Resp 17   Ht 1.6 m (5' 3\")   Wt 73.9 kg (163 lb)   SpO2 93%   BMI 28.87 kg/m²       GEN: well developed, well nourished, NAD  HEENT: NCAT, PERRL, EOMI, mucous membranes pink and moist  CV: RRR, no murmurs, rubs or gallops  PULM: CTAB, no rales or rhonchi. Respirations WNL and unlabored  ABD: soft, NT, ND, BS+ and equal  NEURO: A&O x3. Sensation intact to light touch.   MSK: Functional ROM BUEs and LLE. Weakness and pain impair AROM R hip .  Strength 5/5 key muscles BUEs and LLE. R hip flexion 3/5, R knee extension 4-/5.  EXTREMITIES: No calf tenderness to palpation bilaterally. Post op edema proximal RLE  SKIN: warm dry and intact with good turgor except R lateral hip incision.   PSYCH: appropriately interactive. Affect WNL.     Diagnostics:     CBC:   No results for input(s): \"WBC\", \"RBC\", \"HGB\", \"HCT\", \"MCV\", \"RDW\", \"PLT\" in the last 72 hours.    BMP:   No results for input(s): \"NA\", \"K\", \"CL\", \"CO2\", \"PHOS\", \"BUN\", \"CREATININE\", \"GLUCOSE\" in the last 72 hours.    Invalid input(s): \"CA\"    BNP: No results for input(s): \"BNP\" in the last 72 hours.  PT/INR: No results for input(s): \"PROTIME\", \"INR\" in the last 72 hours.  APTT: No results for input(s): \"APTT\" in the last 72 hours.  CARDIAC ENZYMES: No results for input(s): \"CKMB\", \"CKMBINDEX\", \"TROPONINT\" in the last 72 hours.    Invalid input(s): \"CKTOTAL;3\" troponins   FASTING LIPID PANEL:  Lab Results   Component Value Date    CHOL 218 06/04/2018    HDL 43 06/04/2018    TRIG 198 06/04/2018     LIVER PROFILE:   No results for input(s): \"AST\", \"ALT\", \"BILIDIR\", \"BILITOT\", \"ALKPHOS\" in the last 72 hours.    Invalid input(s): \"ALB\"       Current Medications:   Current Facility-Administered Medications: vitamin B-12 (CYANOCOBALAMIN) tablet 1,000 mcg, 1,000 mcg, Oral, Daily  acetaminophen (TYLENOL) tablet 650 mg, 650 mg, Oral, Q4H PRN  senna (SENOKOT) tablet 17.2 mg, 2  diversion.

## 2024-08-22 NOTE — PROGRESS NOTES
Physical Therapy  Facility/Department: [facility] ACUTE REHAB  Rehabilitation Physical Therapy     NAME: Amina Woods  : 1935 (89 y.o.)  MRN: [id number]  CODE STATUS: Full Code    Date of Service: 24      Past Medical History:   Diagnosis Date    Basal cell carcinoma     mulitple    Blood in feces 2016    Hyperlipidemia 2011    Mild hyperglycemia. 2011    Orthostatic hypotension 2011    Osteoarthritis     knees, thumbs    Post-menopausal     Pre-cancerous skin lesions. 2011    Prolonged emergence from general anesthesia     Psoriasis 2011    Syncope, non cardiac     \"vascular syncope\" per patient - last episode was      Past Surgical History:   Procedure Laterality Date    APPENDECTOMY      during [facility]    CATARACT REMOVAL WITH IMPLANT Bilateral     COLON SURGERY      fistula repair- pt not sure    COLONOSCOPY  ,     FOOT SURGERY      [facility]    HYSTERECTOMY (CERVIX STATUS UNKNOWN)      complete, d/t fibroids    JOINT REPLACEMENT      Right and left knees    SIGMOIDOSCOPY      SKIN CANCER DESTRUCTION      basal cell    SKIN CANCER EXCISION      basal cell    TOTAL HIP ARTHROPLASTY Right 2024    HIP TOTAL ARTHROPLASTY ASI performed by Aristeo See MD at [facility] OR       Chart Reviewed: Yes  Additional Pertinent Hx: Per Ortho note Patient is a 89 y.o. 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 The patient has failed all types of conservative treatments including physical therapy, NSAIDs, weight loss counseling, and intra-articular steroid injection. The patient's activities of daily living have become significantly restricted. Having failed conservative treatment, the patient has agreed to proceed  with total hip arthroplasty aware of all risks highlighted in the surgical consent form.  Diagnosis: DJD of the right hip    Restrictions:  Restrictions/Precautions: Weight Bearing;General Precautions;Fall Risk  Lower Extremity Weight  Bearing Restrictions  Right Lower Extremity Weight Bearing: Weight Bearing As Tolerated  Position Activity Restriction  Hip Precautions: Anterior hip precautions  Other position/activity restrictions: No straight leg raises     SUBJECTIVE  Subjective: patient reporting pain tolerable, but swelling still noted            OBJECTIVE                    Functional Mobility  Bed mobility  Rolling to Left: Minimal assistance  Supine to Sit: Moderate assistance  Sit to Supine: Moderate assistance  Bed Mobility Comments: HOB slightly elevated. Use of bed rail  Transfers  Sit to Stand: Minimal Assistance  Stand to Sit: Minimal Assistance  Bed to Chair: Contact guard assistance  Stand Pivot Transfers: Contact guard assistance    Environmental Mobility  Ambulation  WB Status: WBAT RLE no SLR  Ambulation  Surface: Level tile  Device: Rolling Walker  Other Apparatus:  (writer pulling w/c)  Assistance: Contact guard assistance  Quality of Gait: antalgic gait, short step length on left and longer stride on right, forward flexed posture with head and gaze downward, bilat toe out with heels almost touching, no heel/toe gait and unsteady high fall risk  Gait Deviations: Deviated path  Distance: 40ft, 57ft  Comments: Cues for longer step length on left and short step on right, cues for weight shifting and bearing more weight into right LE, cues for increased heel/toe gait and attempting to widen JUANA slightly.  Ambulation 2  Surface - 2: level tile  Device 2: Rolling Walker  Other Apparatus 2:  (writer pulling w/c)  Assistance 2: Contact guard assistance  Quality of Gait 2: improved weight bearing into right LE, improved step length, continues with narrow JUANA with bilat toe out, forward flexed posture with head and gaze downward, improved stablity.  Gait Deviations: Decreased head and trunk rotation;Slow Tiesha;Decreased step height  Distance: 72ft  Comments: Cues to use bilat LE more and less of bilat UE. Pt heavily relying on bilat

## 2024-08-23 PROCEDURE — 97530 THERAPEUTIC ACTIVITIES: CPT

## 2024-08-23 PROCEDURE — 97116 GAIT TRAINING THERAPY: CPT

## 2024-08-23 PROCEDURE — 6370000000 HC RX 637 (ALT 250 FOR IP): Performed by: ORTHOPAEDIC SURGERY

## 2024-08-23 PROCEDURE — 97110 THERAPEUTIC EXERCISES: CPT

## 2024-08-23 PROCEDURE — 6370000000 HC RX 637 (ALT 250 FOR IP): Performed by: STUDENT IN AN ORGANIZED HEALTH CARE EDUCATION/TRAINING PROGRAM

## 2024-08-23 PROCEDURE — 97535 SELF CARE MNGMENT TRAINING: CPT

## 2024-08-23 PROCEDURE — 99232 SBSQ HOSP IP/OBS MODERATE 35: CPT | Performed by: INTERNAL MEDICINE

## 2024-08-23 PROCEDURE — 6370000000 HC RX 637 (ALT 250 FOR IP): Performed by: INTERNAL MEDICINE

## 2024-08-23 PROCEDURE — 6370000000 HC RX 637 (ALT 250 FOR IP): Performed by: PHYSICAL MEDICINE & REHABILITATION

## 2024-08-23 PROCEDURE — 99232 SBSQ HOSP IP/OBS MODERATE 35: CPT | Performed by: PHYSICAL MEDICINE & REHABILITATION

## 2024-08-23 PROCEDURE — 1180000000 HC REHAB R&B

## 2024-08-23 RX ADMIN — ACETAMINOPHEN 650 MG: 325 TABLET ORAL at 11:55

## 2024-08-23 RX ADMIN — ACETAMINOPHEN 650 MG: 325 TABLET ORAL at 17:09

## 2024-08-23 RX ADMIN — FERROUS SULFATE TAB 325 MG (65 MG ELEMENTAL FE) 325 MG: 325 (65 FE) TAB at 08:40

## 2024-08-23 RX ADMIN — FERROUS SULFATE TAB 325 MG (65 MG ELEMENTAL FE) 325 MG: 325 (65 FE) TAB at 17:09

## 2024-08-23 RX ADMIN — TRAMADOL HYDROCHLORIDE 50 MG: 50 TABLET ORAL at 08:39

## 2024-08-23 RX ADMIN — ACETAMINOPHEN 650 MG: 325 TABLET ORAL at 23:00

## 2024-08-23 RX ADMIN — ASPIRIN 81 MG: 81 TABLET, COATED ORAL at 08:40

## 2024-08-23 RX ADMIN — Medication 6 MG: at 23:00

## 2024-08-23 RX ADMIN — PANTOPRAZOLE SODIUM 40 MG: 40 TABLET, DELAYED RELEASE ORAL at 05:16

## 2024-08-23 RX ADMIN — CYANOCOBALAMIN TAB 1000 MCG 1000 MCG: 1000 TAB at 08:40

## 2024-08-23 RX ADMIN — ASPIRIN 81 MG: 81 TABLET, COATED ORAL at 20:52

## 2024-08-23 RX ADMIN — ACETAMINOPHEN 650 MG: 325 TABLET ORAL at 05:16

## 2024-08-23 ASSESSMENT — PAIN DESCRIPTION - ORIENTATION
ORIENTATION: RIGHT
ORIENTATION: RIGHT

## 2024-08-23 ASSESSMENT — PAIN DESCRIPTION - ONSET
ONSET: ON-GOING
ONSET: ON-GOING

## 2024-08-23 ASSESSMENT — PAIN DESCRIPTION - DESCRIPTORS
DESCRIPTORS: ACHING
DESCRIPTORS: ACHING

## 2024-08-23 ASSESSMENT — PAIN DESCRIPTION - PAIN TYPE
TYPE: ACUTE PAIN
TYPE: ACUTE PAIN

## 2024-08-23 ASSESSMENT — PAIN DESCRIPTION - LOCATION
LOCATION: LEG
LOCATION: LEG

## 2024-08-23 ASSESSMENT — PAIN SCALES - GENERAL
PAINLEVEL_OUTOF10: 6
PAINLEVEL_OUTOF10: 1
PAINLEVEL_OUTOF10: 2
PAINLEVEL_OUTOF10: 6

## 2024-08-23 ASSESSMENT — PAIN DESCRIPTION - FREQUENCY
FREQUENCY: INTERMITTENT
FREQUENCY: INTERMITTENT

## 2024-08-23 ASSESSMENT — PAIN SCALES - WONG BAKER: WONGBAKER_NUMERICALRESPONSE: NO HURT

## 2024-08-23 NOTE — PROGRESS NOTES
[facility]   IN-PATIENT SERVICE   [facility]    HISTORY AND PHYSICAL EXAMINATION            Date:   8/23/2024  Patient name:  Amina Woods  Date of admission:  8/18/2024 11:59 AM  MRN:   [id number]  Account:  [id number]  YOB: 1935  PCP:    Paradise Estrella MD  Room:   [address] [place] [address]  Code Status:    Full Code    Chief Complaint:     Medical management    History Obtained From:     patient    History of Present Illness:     The patient is a 89 y.o.  Non- / non  female who presents with No chief complaint on file.   and she is admitted to the hospital for the management of medical issues.    89-year-old female with history of orthostatic hypotension, psoriasis, vascular syncope, initially presented for surgical intervention of the right hip osteoarthritis, underwent total hip arthroplasty on 8/13/2024.  Rapid response was called on 8/14/2024 as she was unresponsive, never lost a pulse.  She was noted to be bradycardic and hypotensive at that time.  When seen today the patient denies any acute concerns, hip pain is improving.    Past Medical History:     Past Medical History:   Diagnosis Date    Basal cell carcinoma     mulitple    Blood in feces 09/19/2016    Hyperlipidemia 08/30/2011    Mild hyperglycemia. 08/30/2011    Orthostatic hypotension 08/30/2011    Osteoarthritis     knees, thumbs    Post-menopausal     Pre-cancerous skin lesions. 08/30/2011    Prolonged emergence from general anesthesia     Psoriasis 08/30/2011    Syncope, non cardiac     \"vascular syncope\" per patient - last episode was 2023        Past Surgical History:     Past Surgical History:   Procedure Laterality Date    APPENDECTOMY      [facility]    CATARACT REMOVAL WITH IMPLANT Bilateral     COLON SURGERY      fistula repair- pt not sure    COLONOSCOPY  1997, 2002    FOOT SURGERY      [facility]    HYSTERECTOMY (CERVIX STATUS UNKNOWN)      complete, d/t fibroids    JOINT REPLACEMENT   patient progressing with PT OT  Hemoglobin 8.1, will start iron supplements, iron studies ordered  Denies any chest pain shortness of breath  Consultations:   IP CONSULT TO DIETITIAN  IP CONSULT TO SOCIAL WORK  IP CONSULT TO INTERNAL MEDICINE  IP CONSULT TO CARDIOLOGY     Patient is admitted as inpatient status because of co-morbidities listed above, severity of signs and symptoms as outlined, requirement for current medical therapies and most importantly because of direct risk to patient if care not provided in a hospital setting.    Stefania Brown MD  8/23/2024  4:14 PM    Copy sent to Dr. Estrella, Paradise Pineda MD    Please note that this chart was generated using voice recognition Dragon dictation software.  Although every effort was made to ensure the accuracy of this automated transcription, some errors in transcription may have occurred.

## 2024-08-23 NOTE — PROGRESS NOTES
Physical Therapy  Facility/Department: [facility] ACUTE REHAB  Rehabilitation Physical Therapy Progress Note    NAME: Amina Woods  : 1935 (89 y.o.)  MRN: [id number]  CODE STATUS: Full Code    Date of Service: 24      Past Medical History:   Diagnosis Date    Basal cell carcinoma     mulitple    Blood in feces 2016    Hyperlipidemia 2011    Mild hyperglycemia. 2011    Orthostatic hypotension 2011    Osteoarthritis     knees, thumbs    Post-menopausal     Pre-cancerous skin lesions. 2011    Prolonged emergence from general anesthesia     Psoriasis 2011    Syncope, non cardiac     \"vascular syncope\" per patient - last episode was      Past Surgical History:   Procedure Laterality Date    APPENDECTOMY      during [facility], [place]    CATARACT REMOVAL WITH IMPLANT Bilateral     COLON SURGERY      fistula repair- pt not sure    COLONOSCOPY  ,     FOOT SURGERY      hammartoe    HYSTERECTOMY (CERVIX STATUS UNKNOWN)      complete, d/t fibroids    JOINT REPLACEMENT      Right and left knees    SIGMOIDOSCOPY      SKIN CANCER DESTRUCTION      basal cell    SKIN CANCER EXCISION      basal cell    TOTAL HIP ARTHROPLASTY Right 2024    HIP TOTAL ARTHROPLASTY ASI performed by Aristeo See MD at [facility] OR       Chart Reviewed: Yes  Patient assessed for rehabilitation services?: Yes  Additional Pertinent Hx: Per Ortho note Patient is a 89 y.o. 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 The patient has failed all types of conservative treatments including physical therapy, NSAIDs, weight loss counseling, and intra-articular steroid injection. The patient's activities of daily living have become significantly restricted. Having failed conservative treatment, the patient has agreed to proceed  with total hip arthroplasty aware of all risks highlighted in the surgical consent form.  Family / Caregiver Present: Yes (3 daughters)  Referring Practitioner: Mayi

## 2024-08-23 NOTE — PROGRESS NOTES
[facility]   Acute Rehabilitation Occupational Therapy Daily Treatment Note    Date: 24  Patient Name: Amina Woods       Room: 2609/2609-01  MRN: [id number]  Account: [id number]   : 1935  (89 y.o.) Gender: female       Referring Practitioner: Meghan Mcmahon MD  Diagnosis: S/P hip replacement, right  Additional Pertinent Hx: Per H&P: Amina Woods is a 89 y.o. female with history of psoriasis, HLD admitted to [facility] on 2024.  She was originally admitted to [facility] on 24.     She initially presented for surgical intervention for right hip osteoarthritis.  She underwent total hip arthroplasty on 24 (Dr. See).  She is WBAT to the right lower limb.  Rapid response/code blue was called on 24 for unresponsiveness but she never lost a pulse.  She was noted to have bradycardia and hypotension at that time.     She is currently requiring assistance for self-care activities and mobility prompting this admission.  She reports ongoing pain in the right lower limb.  She also notes right lower limb edema and constipation.  She denies any chest pain, palpitations, and shortness of breath    Treatment Diagnosis: Impaired self-care status    Past Medical History:  has a past medical history of Basal cell carcinoma, Blood in feces, Hyperlipidemia, Mild hyperglycemia., Orthostatic hypotension, Osteoarthritis, Post-menopausal, Pre-cancerous skin lesions., Prolonged emergence from general anesthesia, Psoriasis, and Syncope, non cardiac.    Past Surgical History:   has a past surgical history that includes Hysterectomy; Colon surgery; Colonoscopy (, ); joint replacement (); Foot surgery; Appendectomy; Skin cancer excision; Skin cancer destruction; sigmoidoscopy (); Cataract removal with implant (Bilateral); and Total hip arthroplasty (Right, 2024).    Restrictions  Restrictions/Precautions  Restrictions/Precautions:  Stand  Assistance Level: Stand by assist  Skilled Clinical Factors: Good hand placement noted.  Stand to Sit  Assistance Level: Stand by assist  Skilled Clinical Factors: Good hand placement noted.    Functional Mobility  Device: Rolling walker  Activity: To/From bathroom  Assistance Level: Stand by assist  Skilled Clinical Factors: Slow and steady pace.    OT Exercises  Exercise Treatment: AM: Instruction and participation in BUE strengthening exercises for improved overall strength and activity tolerance needed for safe completion of self-care tasks. Pt tolerated well, completed 1 set x15 reps each in all available planes, utilizing 2# free weight. RB taken as needed throughout. Good technique and pacing, tolerates well with RB.    Dynamic Standing Balance Exercises: PM: Pt instruction in dynamic standing tasks for facilitation of increased balance and tolerance to support safety and I with ADLs. Pt completes tabletop task while staning for ~13 minutes. Demos with good posture throughout and no LOB.    Motor Control/Coordination: AM: Pt instruction in BUE FMC and intrinsic hand strengthening tasks for facilitation of increased ease, safety and I with ALD/IADLs. Pt complete puzzle while seated at tabeltop. Tolerates well with RB taken as needed. ; PM: Continues puzzling task for improved activity tolerance.    Assessment  Assessment  Activity Tolerance: Patient tolerated treatment well  Discharge Recommendations: Home with assist PRN;Continue to assess pending progress    Patient Education  Education  Education Given To: Patient  Education Provided: Role of Therapy;Plan of Care;Safety;ADL Function;Transfer Training;Energy Conservation;Fall Prevention Strategies  Education Method: Verbal;Demonstration  Barriers to Learning: None  Education Outcome: Verbalized understanding;Demonstrated understanding;Continued education needed      Safety Devices  Safety Devices in place: Yes  Type of devices: Left in chair;Call

## 2024-08-23 NOTE — PROGRESS NOTES
Physical Medicine & Rehabilitation  Progress Note      Subjective:      89 year-old female with R hip OA treated by JORJE. Patient is noting poor sleep last night and some mild increase in her R hip pain today. She does feel that edema is improving and that pain is responding to ice, prn Tramadol and Tylenol.     ROS:  Denies fevers, chills, sweats.  No chest pain, palpitations, lightheadedness.  Denies coughing, wheezing or shortness of breath.  Denies abdominal pain, nausea, diarrhea or constipation.  No new areas of joint pain.  Denies new areas of numbness or weakness.  Denies new anxiety or depression issues.  No new skin problems.    Rehabilitation:       PT:    Bed mobility  Rolling to Left: Minimal assistance  Supine to Sit: Moderate assistance  Sit to Supine: Moderate assistance  Scooting: Moderate assistance  Bed Mobility Comments: HOB slightly elevated. Use of bed rail         Transfers  Sit to Stand: Minimal Assistance  Stand to Sit: Minimal Assistance  Bed to Chair: Contact guard assistance (No AD)  Stand Pivot Transfers: Contact guard assistance  Comment: Toilet Transfer CGA         Ambulation  WB Status: WBAT RLE no SLR  Ambulation  Surface: Level tile  Device: Rolling Walker  Other Apparatus:  (writer pulling w/c)  Assistance: Contact guard assistance  Quality of Gait: antalgic gait, short step length on left and longer stride on right, forward flexed posture with head and gaze downward, bilat toe out with heels almost touching, no heel/toe gait and unsteady high fall risk  Gait Deviations: Deviated path  Distance: 40ft, 57ft  Comments: Cues for longer step length on left and short step on right, cues for weight shifting and bearing more weight into right LE, cues for increased heel/toe gait and attempting to widen JUANA slightly.  More Ambulation?: Yes  Ambulation 2  Surface - 2: level tile  Device 2: Rolling Walker  Other Apparatus 2:  (writer pulling w/c)  Assistance 2: Contact guard  tablet 17.2 mg, 2 tablet, Oral, Daily PRN  bisacodyl (DULCOLAX) suppository 10 mg, 10 mg, Rectal, Daily PRN  acetaminophen (TYLENOL) tablet 650 mg, 650 mg, Oral, Q6H  aspirin EC tablet 81 mg, 81 mg, Oral, BID  midodrine (PROAMATINE) tablet 5 mg, 5 mg, Oral, Q8H PRN  pantoprazole (PROTONIX) tablet 40 mg, 40 mg, Oral, QAM AC  traMADol (ULTRAM) tablet 25 mg, 25 mg, Oral, Q6H PRN **OR** traMADol (ULTRAM) tablet 50 mg, 50 mg, Oral, Q6H PRN  docusate sodium (COLACE) capsule 100 mg, 100 mg, Oral, BID PRN      Impression/Plan:   Impaired ADLs, gait, and mobility due to:      Right hip osteoarthritis:  S/p total hip arthroplasty on 8/13/24 (Dr. See).  PT/OT for gait, mobility, strengthening, endurance, ADLs, and self care.  WBAT to the right lower limb.  Pain control with scheduled tylenol, as-needed tramadol. Ice prn pain/swelling.  Recent bradycardia, hypotension:  Improved.  Has midodrine as needed  Anemia:  Hemoglobin low but stable.  Monitoring.  Psoriasis: stable. No current medication.  HLD:  Not currently on medication  Bowel Management: Docusate prn, senokot prn, dulcolax prn.  DVT Prophylaxis:   Aspirin 81mg BID  Internal Medicine for medical management  Follow up PCP 1-2 weeks, Orthopedic Surgery - Dr. See      Electronically signed by CURTIS DOMINGO MD on 8/23/2024 at 10:40 AM      This note is created with the assistance of a speech recognition program.  While intending to generate a document that actually reflects the content of the visit, the document can still have some errors including those of syntax and sound a like substitutions which may escape proof reading.  In such instances, actual meaning can be extrapolated by contextual diversion.

## 2024-08-23 NOTE — TELEPHONE ENCOUNTER
Left message with acute rehab, number given that Dr. See would prefer patient to come into office

## 2024-08-23 NOTE — PROGRESS NOTES
Date:   8/23/2024  Patient name: Amina Woods  Date of admission:  8/18/2024 11:59 AM  MRN:   [id number]  YOB: 1935  PCP: Paradise Estrella MD    Reason for Admission: Debility [R53.81]    Cardiology follow-up: Status post syncopal episode, transient bradycardia and hypotension        Referring physician: Dr Stefania Brown     Impression     Status post syncopal episode/transient bradycardia/hypotension  Post syncope no neurological deficit no arrhythmias noted  Right hip total arthroplasty 8/13/2024  History of fainting episodes since childhood  No history of exertional angina  No history of coronary intervention  Normal LV systolic function ejection fraction 60% no obvious valvular abnormality  Conduction disorder, right bundle branch  History of postural hypotension has been taking midodrine 2.5 mg as needed  History of hyperlipidemia  Psoriasis  History of previous syncopal episode/neurocardiogenic syncope  Moderate hiatal hernia  Postop anemia hemoglobin 8.1 on 8/19/2024     Past surgical history bilateral knee replacement, hysterectomy      Investigation workup     ECG 8/15/2024  Sinus rhythm, right bundle branch block, no diagnostic change from 7/30/2024     Chest x-ray 8/15/2024  Low lung volumes no acute cardiopulmonary process  Moderate hiatal hernia     ECG 7/30/2024  Normal sinus rhythm heart rate 79, low voltage, right bundle branch block, heart rate 90     2D echo 8/14/2024  Ejection fraction 60 to 65% mild LVH, normal wall motion normal LV size  Normal RV size and function  No significant valvular abnormality  Normal IVC diameter and respiratory variation  No pericardial effusion     Lexiscan  Myoview stress test March 14, 2014   no ischemia no infarct     History of present illness     89-year-old female who lives alone, independent in ADL, her daughter lives next door  She underwent right total hip arthroplasty on 8/13/2024.  On 8/14/2024 at about 10 AM KYREE BAEZ was called.   28.87 kg/m²   General appearance: alert and cooperative with exam  HEENT: Head: Normal, normocephalic, atraumatic.  Neck: no JVD and supple, symmetrical, trachea midline  Lungs: diminished breath sounds bibasilar  Heart: regular rate and rhythm  Abdomen:  Obese soft bowel sounds present  Extremities:  Marked edema right leg  Neurologic: Mental status: Alert, oriented, thought content appropriate    EKG: Sinus rhythm with right bundle branch.  ECHO: reviewed.   Ejection fraction: 60%, mild LVH no valvular abnormality normal IVC dimension  Stress Test: reviewed.  Cardiac Angiography: not obtained.    Assessment / Acute Cardiac Problems:     8/14/2024 at about 10 AM syncopal episode while resting on chair after coming back from bathroom, transient bradycardia, hypotension  No seizure activity no neurodeficit  Heart rate blood pressure got stabilized within short time  No chest pain  No ischemic ECG changes  Right hip replacement 8/13/2024  Previous history of postural hypotension on midodrine 2.5 mg p.o. as needed  No history of coronary intervention  Conduction disorder, right bundle branch block      Patient Active Problem List:     Osteoarthritis     Hyperlipidemia     Mild hyperglycemia.     Hypotension     Psoriasis     Pre-cancerous skin lesions.     Elevated blood pressure reading     S/P total knee replacement     Disorder of lipid metabolism     Anemia     Dizziness     Acid reflux     Osteopenia     Vasovagal syncope     Vitamin D deficiency     Drug-induced constipation     Lumbar radiculopathy     Osteoarthritis of right hip     Primary osteoarthritis of right hip     Debility     S/P hip replacement, right      Plan of Treatment:   Medications reviewed  1: Status post transient bradycardia, hypotension, syncopal episode continue ECG monitor, on midodrine 5 mg as needed  2: History of postural hypotension hold losartan/no blood pressure medication, no beta-blocker  3: Continue current dose of aspirin,

## 2024-08-23 NOTE — TELEPHONE ENCOUNTER
Patient's daughter Karolyn inquired if mother should keep post-op appointment. Per Dr. See's response, Karolyn advised that mother should keep appointment. Karolyn voices understanding.

## 2024-08-23 NOTE — PLAN OF CARE
Problem: Discharge Planning  Goal: Discharge to home or other facility with appropriate resources  8/23/2024 0017 by Jessenia Gonzalez, RN  Outcome: Progressing     Problem: Skin/Tissue Integrity  Goal: Absence of new skin breakdown  Description: 1.  Monitor for areas of redness and/or skin breakdown  2.  Assess vascular access sites hourly  3.  Every 4-6 hours minimum:  Change oxygen saturation probe site  4.  Every 4-6 hours:  If on nasal continuous positive airway pressure, respiratory therapy assess nares and determine need for appliance change or resting period.  8/23/2024 0017 by Jessenia Gonzalez, RN  Outcome: Progressing     Problem: Safety - Adult  Goal: Free from fall injury  8/23/2024 0017 by Jessenia Gonzalez RN  Outcome: Progressing  Flowsheets (Taken 8/19/2024 1040 by Ibis Zaragoza, RN)  Free From Fall Injury: Instruct family/caregiver on patient safety     Problem: ABCDS Injury Assessment  Goal: Absence of physical injury  8/23/2024 0017 by Jessenia Gonzalez, RN  Outcome: Progressing     Problem: Pain  Goal: Verbalizes/displays adequate comfort level or baseline comfort level  8/23/2024 0017 by Jessenia Gonzalez, RN  Outcome: Progressing     Problem: Nutrition Deficit:  Goal: Optimize nutritional status  8/23/2024 0017 by Jessenia Gonzalez, RN  Outcome: Progressing

## 2024-08-24 LAB
ANION GAP SERPL CALCULATED.3IONS-SCNC: 7 MMOL/L (ref 9–17)
BASOPHILS # BLD: 0 K/UL (ref 0–0.2)
BASOPHILS NFR BLD: 1 % (ref 0–2)
BNP SERPL-MCNC: 878 PG/ML
BUN SERPL-MCNC: 15 MG/DL (ref 8–23)
CALCIUM SERPL-MCNC: 8.9 MG/DL (ref 8.6–10.4)
CHLORIDE SERPL-SCNC: 100 MMOL/L (ref 98–107)
CO2 SERPL-SCNC: 32 MMOL/L (ref 20–31)
CREAT SERPL-MCNC: 0.8 MG/DL (ref 0.5–0.9)
EOSINOPHIL # BLD: 0.3 K/UL (ref 0–0.4)
EOSINOPHILS RELATIVE PERCENT: 4 % (ref 0–4)
ERYTHROCYTE [DISTWIDTH] IN BLOOD BY AUTOMATED COUNT: 13.6 % (ref 11.5–14.9)
GFR, ESTIMATED: 70 ML/MIN/1.73M2
GLUCOSE SERPL-MCNC: 109 MG/DL (ref 70–99)
HCT VFR BLD AUTO: 24.5 % (ref 36–46)
HGB BLD-MCNC: 8.3 G/DL (ref 12–16)
LYMPHOCYTES NFR BLD: 1.2 K/UL (ref 1–4.8)
LYMPHOCYTES RELATIVE PERCENT: 18 % (ref 24–44)
MCH RBC QN AUTO: 30.1 PG (ref 26–34)
MCHC RBC AUTO-ENTMCNC: 33.7 G/DL (ref 31–37)
MCV RBC AUTO: 89.3 FL (ref 80–100)
MONOCYTES NFR BLD: 0.6 K/UL (ref 0.1–1.3)
MONOCYTES NFR BLD: 8 % (ref 1–7)
NEUTROPHILS NFR BLD: 69 % (ref 36–66)
NEUTS SEG NFR BLD: 4.8 K/UL (ref 1.3–9.1)
PLATELET # BLD AUTO: 422 K/UL (ref 150–450)
PMV BLD AUTO: 6.5 FL (ref 6–12)
POTASSIUM SERPL-SCNC: 4.7 MMOL/L (ref 3.7–5.3)
RBC # BLD AUTO: 2.75 M/UL (ref 4–5.2)
SODIUM SERPL-SCNC: 139 MMOL/L (ref 135–144)
WBC OTHER # BLD: 7 K/UL (ref 3.5–11)

## 2024-08-24 PROCEDURE — 97110 THERAPEUTIC EXERCISES: CPT

## 2024-08-24 PROCEDURE — 1180000000 HC REHAB R&B

## 2024-08-24 PROCEDURE — 6370000000 HC RX 637 (ALT 250 FOR IP): Performed by: STUDENT IN AN ORGANIZED HEALTH CARE EDUCATION/TRAINING PROGRAM

## 2024-08-24 PROCEDURE — 6370000000 HC RX 637 (ALT 250 FOR IP): Performed by: INTERNAL MEDICINE

## 2024-08-24 PROCEDURE — 6370000000 HC RX 637 (ALT 250 FOR IP): Performed by: ORTHOPAEDIC SURGERY

## 2024-08-24 PROCEDURE — 36415 COLL VENOUS BLD VENIPUNCTURE: CPT

## 2024-08-24 PROCEDURE — 97535 SELF CARE MNGMENT TRAINING: CPT

## 2024-08-24 PROCEDURE — 6370000000 HC RX 637 (ALT 250 FOR IP): Performed by: PHYSICAL MEDICINE & REHABILITATION

## 2024-08-24 PROCEDURE — 97116 GAIT TRAINING THERAPY: CPT

## 2024-08-24 PROCEDURE — 97530 THERAPEUTIC ACTIVITIES: CPT

## 2024-08-24 PROCEDURE — 80048 BASIC METABOLIC PNL TOTAL CA: CPT

## 2024-08-24 PROCEDURE — 85025 COMPLETE CBC W/AUTO DIFF WBC: CPT

## 2024-08-24 PROCEDURE — 99232 SBSQ HOSP IP/OBS MODERATE 35: CPT | Performed by: INTERNAL MEDICINE

## 2024-08-24 PROCEDURE — 83880 ASSAY OF NATRIURETIC PEPTIDE: CPT

## 2024-08-24 PROCEDURE — 99232 SBSQ HOSP IP/OBS MODERATE 35: CPT | Performed by: PHYSICAL MEDICINE & REHABILITATION

## 2024-08-24 RX ADMIN — CYANOCOBALAMIN TAB 1000 MCG 1000 MCG: 1000 TAB at 08:10

## 2024-08-24 RX ADMIN — FERROUS SULFATE TAB 325 MG (65 MG ELEMENTAL FE) 325 MG: 325 (65 FE) TAB at 16:38

## 2024-08-24 RX ADMIN — ASPIRIN 81 MG: 81 TABLET, COATED ORAL at 08:10

## 2024-08-24 RX ADMIN — FERROUS SULFATE TAB 325 MG (65 MG ELEMENTAL FE) 325 MG: 325 (65 FE) TAB at 08:10

## 2024-08-24 RX ADMIN — PANTOPRAZOLE SODIUM 40 MG: 40 TABLET, DELAYED RELEASE ORAL at 05:03

## 2024-08-24 RX ADMIN — ACETAMINOPHEN 650 MG: 325 TABLET ORAL at 16:38

## 2024-08-24 RX ADMIN — ACETAMINOPHEN 650 MG: 325 TABLET ORAL at 05:03

## 2024-08-24 RX ADMIN — Medication 6 MG: at 20:58

## 2024-08-24 RX ADMIN — TRAMADOL HYDROCHLORIDE 50 MG: 50 TABLET ORAL at 09:03

## 2024-08-24 RX ADMIN — ASPIRIN 81 MG: 81 TABLET, COATED ORAL at 20:58

## 2024-08-24 RX ADMIN — ACETAMINOPHEN 650 MG: 325 TABLET ORAL at 12:06

## 2024-08-24 ASSESSMENT — PAIN SCALES - GENERAL
PAINLEVEL_OUTOF10: 0
PAINLEVEL_OUTOF10: 1
PAINLEVEL_OUTOF10: 1
PAINLEVEL_OUTOF10: 2

## 2024-08-24 ASSESSMENT — PAIN DESCRIPTION - LOCATION: LOCATION: LEG

## 2024-08-24 ASSESSMENT — PAIN DESCRIPTION - ONSET: ONSET: ON-GOING

## 2024-08-24 ASSESSMENT — PAIN - FUNCTIONAL ASSESSMENT: PAIN_FUNCTIONAL_ASSESSMENT: ACTIVITIES ARE NOT PREVENTED

## 2024-08-24 ASSESSMENT — PAIN DESCRIPTION - PAIN TYPE: TYPE: SURGICAL PAIN

## 2024-08-24 ASSESSMENT — PAIN DESCRIPTION - ORIENTATION: ORIENTATION: RIGHT

## 2024-08-24 ASSESSMENT — PAIN DESCRIPTION - FREQUENCY: FREQUENCY: INTERMITTENT

## 2024-08-24 NOTE — PROGRESS NOTES
Physical Medicine & Rehabilitation  Progress Note    8/24/2024 11:23 AM     CC: Ambulatory and ADL dysfunction due to right total hip arthroplasty secondary osteoarthritis on 8/13/2024    Subjective:   Feels well.  No complaints.  Pain controlled.  Slept well.  Using Ultram as needed.    ROS:  Denies fevers, chills, sweats.  No chest pain, palpitations, lightheadedness.  Denies coughing, wheezing or shortness of breath.  Denies abdominal pain, nausea, diarrhea or constipation.  No new areas of joint pain.  Denies new areas of numbness or weakness.  Denies new anxiety or depression issues.  No new skin problems.    Rehabilitation:   PT:    Bed mobility  Rolling to Left: Minimal assistance  Supine to Sit: Moderate assistance  Sit to Supine: Moderate assistance  Scooting: Moderate assistance  Bed Mobility Comments: HOB slightly elevated. Use of bed rail    Transfers  Sit to Stand: Contact guard assistance  Stand to Sit: Contact guard assistance  Bed to Chair: Contact guard assistance  Stand Pivot Transfers: Contact guard assistance  Comment: With RW    Ambulation  Surface: Level tile  Device: Rolling Walker  Other Apparatus:  (writer pulling w/c)  Assistance: Contact guard assistance  Quality of Gait: antalgic gait, short step length on left and longer stride on right, forward flexed posture with head and gaze downward, bilat toe out with heels almost touching, no heel/toe gait and unsteady high fall risk  Gait Deviations: Deviated path  Distance: 162ft with 180 turn in AM  Comments: Pt requiring cues for increasing L Step length and improving upright posture and forward gaze.  More Ambulation?: Yes  Ambulation 2  Surface - 2: level tile  Device 2: Rolling Walker  Other Apparatus 2:  (writer pulling w/c)  Assistance 2: Contact guard assistance  Quality of Gait 2: improved weight bearing into right LE, improved step length, continues with narrow JUANA with bilat toe out, forward flexed posture with head and gaze downward,

## 2024-08-24 NOTE — PROGRESS NOTES
Physical Therapy  Facility/Department: [facility] ACUTE REHAB  Rehabilitation Physical Therapy Progress Note    NAME: Amina Woods  : 1935 (89 y.o.)  MRN: [id number]  CODE STATUS: Full Code    Date of Service: 24      Past Medical History:   Diagnosis Date    Basal cell carcinoma     mulitple    Blood in feces 2016    Hyperlipidemia 2011    Mild hyperglycemia. 2011    Orthostatic hypotension 2011    Osteoarthritis     knees, thumbs    Post-menopausal     Pre-cancerous skin lesions. 2011    Prolonged emergence from general anesthesia     Psoriasis 2011    Syncope, non cardiac     \"vascular syncope\" per patient - last episode was      Past Surgical History:   Procedure Laterality Date    APPENDECTOMY      during [facility]    CATARACT REMOVAL WITH IMPLANT Bilateral     COLON SURGERY      fistula repair- pt not sure    COLONOSCOPY  ,     FOOT SURGERY      hammartoe    HYSTERECTOMY (CERVIX STATUS UNKNOWN)      complete, d/t fibroids    JOINT REPLACEMENT      Right and left knees    SIGMOIDOSCOPY      SKIN CANCER DESTRUCTION      basal cell    SKIN CANCER EXCISION      basal cell    TOTAL HIP ARTHROPLASTY Right 2024    HIP TOTAL ARTHROPLASTY ASI performed by Aristeo See MD at [facility] OR       Chart Reviewed: Yes  Patient assessed for rehabilitation services?: Yes  Additional Pertinent Hx: Per Ortho note Patient is a 89 y.o. 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 The patient has failed all types of conservative treatments including physical therapy, NSAIDs, weight loss counseling, and intra-articular steroid injection. The patient's activities of daily living have become significantly restricted. Having failed conservative treatment, the patient has agreed to proceed  with total hip arthroplasty aware of all risks highlighted in the surgical consent form.  Family / Caregiver Present: Yes (3 daughters)  Referring Practitioner: Mayi  Aristeo SUTTON MD  Referral Date : 08/13/24  Diagnosis: DJD of the right hip  General Comment  Comments: Pt seated in WC holdinging pants over lap and requiring assist to don upon arrival. Pt also assisted indonning PATRICK hose and shoes prior to deparrture to gym. Pt reporting RN helped her up this morning, having good slleep, and continuing to maintain a good appetite.    Restrictions:  Restrictions/Precautions: Weight Bearing;General Precautions;Fall Risk  Lower Extremity Weight Bearing Restrictions  Right Lower Extremity Weight Bearing: Weight Bearing As Tolerated  Position Activity Restriction  Hip Precautions: Anterior hip precautions  Other position/activity restrictions: No straight leg raises     SUBJECTIVE  Pain: 1/10 R hip       OBJECTIVE  Cognition  Overall Cognitive Status: WFL  Arousal/Alertness: Appears intact  Following Commands: Follows multistep commands with repitition  Attention Span: Attends with cues to redirect  Memory: Appears intact  Safety Judgement: Appears intact  Problem Solving: Appears intact  Insights: Decreased awareness of deficits  Initiation: Requires cues for some  Sequencing: Requires cues for some  Cognition Comment: Pt demo's attempt to prematurely stand while on BSC indicating decreased awareness of need for assistance.    Functional Mobility  Transfers  Sit to Stand: Contact guard assistance  Stand to Sit: Contact guard assistance  Comment: With RW  Balance  Posture: Fair  Sitting - Static: Good  Sitting - Dynamic: Fair;+  Standing - Static: Good;-  Standing - Dynamic: Fair  Comments: Seated Edge of Chair and Standing with RW    Environmental Mobility  Ambulation  WB Status: WBAT RLE no SLR  Ambulation  Surface: Level tile  Device: Rolling Walker  Other Apparatus:  (writer pulling w/c)  Quality of Gait: antalgic gait, short step length on left and longer stride on right, forward flexed posture with head and gaze downward, bilat toe out with heels almost touching, no heel/toe gait

## 2024-08-24 NOTE — PROGRESS NOTES
Date:   8/24/2024  Patient name: Amina Woods  Date of admission:  8/18/2024 11:59 AM  MRN:   [id number]  YOB: 1935  PCP: Paradise Estrella MD    Reason for Admission: Debility [R53.81]    Cardiology follow-up: Status post syncopal episode, transient bradycardia and hypotension        Referring physician: Dr Stefania Brown     Impression     Status post syncopal episode/transient bradycardia/hypotension  Post syncope no neurological deficit no arrhythmias noted  Right hip total arthroplasty 8/13/2024  History of fainting episodes since childhood  No history of exertional angina  No history of coronary intervention  Normal LV systolic function ejection fraction 60% no obvious valvular abnormality  Conduction disorder, right bundle branch  History of postural hypotension has been taking midodrine 2.5 mg as needed  History of hyperlipidemia  Psoriasis  History of previous syncopal episode/neurocardiogenic syncope  Moderate hiatal hernia  Postop anemia hemoglobin 8.1 on 8/19/2024     Past surgical history bilateral knee replacement, hysterectomy      Investigation workup     ECG 8/15/2024  Sinus rhythm, right bundle branch block, no diagnostic change from 7/30/2024     Chest x-ray 8/15/2024  Low lung volumes no acute cardiopulmonary process  Moderate hiatal hernia     ECG 7/30/2024  Normal sinus rhythm heart rate 79, low voltage, right bundle branch block, heart rate 90     2D echo 8/14/2024  Ejection fraction 60 to 65% mild LVH, normal wall motion normal LV size  Normal RV size and function  No significant valvular abnormality  Normal IVC diameter and respiratory variation  No pericardial effusion     Lexiscan  Myoview stress test March 14, 2014   no ischemia no infarct    History of present illness     89-year-old female who lives alone, independent in ADL, her daughter lives next door  She underwent right total hip arthroplasty on 8/13/2024.  On 8/14/2024 at about 10 AM KYREE BAEZ was called.  She  losartan/no blood pressure medication, no beta-blocker  3: Continue current dose of aspirin, Protonix, ferrous sulfate for anemia  , Midodrine 5 mg as needed every 8 hours for blood pressure below 100    Electronically signed by Juan Jose Hyman MD on 8/24/2024 at 10:24 AM   Statement Selected

## 2024-08-24 NOTE — PROGRESS NOTES
[facility]   Acute Rehabilitation Occupational Therapy Daily Treatment Note    Date: 24  Patient Name: Amina Woods       Room: 2609/2609-01  MRN: [id number]  Account: [id number]   : 1935  (89 y.o.) Gender: female       Referring Practitioner: Meghan Mcmahon MD  Diagnosis: S/P hip replacement, right  Additional Pertinent Hx: Per H&P: Amina Woods is a 89 y.o. female with history of psoriasis, HLD admitted to [facility] on 2024.  She was originally admitted to [facility] on 24.     She initially presented for surgical intervention for right hip osteoarthritis.  She underwent total hip arthroplasty on 24 (Dr. See).  She is WBAT to the right lower limb.  Rapid response/code blue was called on 24 for unresponsiveness but she never lost a pulse.  She was noted to have bradycardia and hypotension at that time.     She is currently requiring assistance for self-care activities and mobility prompting this admission.  She reports ongoing pain in the right lower limb.  She also notes right lower limb edema and constipation.  She denies any chest pain, palpitations, and shortness of breath    Treatment Diagnosis: Impaired self-care status    Past Medical History:  has a past medical history of Basal cell carcinoma, Blood in feces, Hyperlipidemia, Mild hyperglycemia., Orthostatic hypotension, Osteoarthritis, Post-menopausal, Pre-cancerous skin lesions., Prolonged emergence from general anesthesia, Psoriasis, and Syncope, non cardiac.    Past Surgical History:   has a past surgical history that includes Hysterectomy; Colon surgery; Colonoscopy (, ); joint replacement (); Foot surgery; Appendectomy; Skin cancer excision; Skin cancer destruction; sigmoidoscopy (); Cataract removal with implant (Bilateral); and Total hip arthroplasty (Right, 2024).    Restrictions  Restrictions/Precautions  Restrictions/Precautions:

## 2024-08-24 NOTE — PLAN OF CARE
Problem: Discharge Planning  Goal: Discharge to home or other facility with appropriate resources  8/24/2024 1115 by Kyleigh Cleaning RN  Outcome: Progressing  Flowsheets (Taken 8/24/2024 0800)  Discharge to home or other facility with appropriate resources: Identify barriers to discharge with patient and caregiver  8/24/2024 0202 by Lacey Klein LPN  Outcome: Progressing     Problem: Skin/Tissue Integrity  Goal: Absence of new skin breakdown  Description: 1.  Monitor for areas of redness and/or skin breakdown  2.  Assess vascular access sites hourly  3.  Every 4-6 hours minimum:  Change oxygen saturation probe site  4.  Every 4-6 hours:  If on nasal continuous positive airway pressure, respiratory therapy assess nares and determine need for appliance change or resting period.  8/24/2024 1115 by Kyleigh Cleaning RN  Outcome: Progressing  8/24/2024 0202 by Lacey Klein LPN  Outcome: Progressing     Problem: Safety - Adult  Goal: Free from fall injury  8/24/2024 1115 by Kyleigh Cleaning RN  Outcome: Progressing  8/24/2024 0202 by Lacey Klein LPN  Outcome: Progressing     Problem: ABCDS Injury Assessment  Goal: Absence of physical injury  8/24/2024 1115 by Kyleigh Cleaning RN  Outcome: Progressing  8/24/2024 0202 by Lacey Klein LPN  Outcome: Progressing     Problem: Pain  Goal: Verbalizes/displays adequate comfort level or baseline comfort level  8/24/2024 1115 by Kyleigh Cleaning RN  Outcome: Progressing  8/24/2024 0202 by Lacey Klein LPN  Outcome: Progressing     Problem: Nutrition Deficit:  Goal: Optimize nutritional status  8/24/2024 1115 by Kyleigh Cleaning RN  Outcome: Progressing  8/24/2024 0202 by Lacey Klein LPN  Outcome: Progressing

## 2024-08-24 NOTE — PROGRESS NOTES
[facility]   IN-PATIENT SERVICE   [facility]    HISTORY AND PHYSICAL EXAMINATION            Date:   8/24/2024  Patient name:  Amina Woods  Date of admission:  8/18/2024 11:59 AM  MRN:   [id number]  Account:  [id number]  YOB: 1935  PCP:    Paradise Estrella MD  Room:   [address] [place] [address]  Code Status:    Full Code    Chief Complaint:     Medical management    History Obtained From:     patient    History of Present Illness:     The patient is a 89 y.o.  Non- / non  female who presents with No chief complaint on file.   and she is admitted to the hospital for the management of medical issues.    89-year-old female with history of orthostatic hypotension, psoriasis, vascular syncope, initially presented for surgical intervention of the right hip osteoarthritis, underwent total hip arthroplasty on 8/13/2024.  Rapid response was called on 8/14/2024 as she was unresponsive, never lost a pulse.  She was noted to be bradycardic and hypotensive at that time.  When seen today the patient denies any acute concerns, hip pain is improving.    Past Medical History:     Past Medical History:   Diagnosis Date    Basal cell carcinoma     mulitple    Blood in feces 09/19/2016    Hyperlipidemia 08/30/2011    Mild hyperglycemia. 08/30/2011    Orthostatic hypotension 08/30/2011    Osteoarthritis     knees, thumbs    Post-menopausal     Pre-cancerous skin lesions. 08/30/2011    Prolonged emergence from general anesthesia     Psoriasis 08/30/2011    Syncope, non cardiac     \"vascular syncope\" per patient - last episode was 2023        Past Surgical History:     Past Surgical History:   Procedure Laterality Date    APPENDECTOMY      during hyst    CATARACT REMOVAL WITH IMPLANT Bilateral     COLON SURGERY      fistula repair- pt not sure    COLONOSCOPY  1997, 2002    FOOT SURGERY      [facility]    HYSTERECTOMY (CERVIX STATUS UNKNOWN)      complete, d/t fibroids    JOINT REPLACEMENT     Problem Relation Age of Onset    Breast Cancer Mother     Heart Disease Father         angina    Cancer Sister         melanoma    Diabetes Sister     Diabetes Sister     Cancer Brother         kidney    Cancer Brother         colon    Diabetes Maternal Grandmother     Prostate Cancer Maternal Grandfather     Cancer Maternal Aunt         melanoma    Cancer Other         ovarian       Review of Systems:     Positive and Negative as described in HPI.    CONSTITUTIONAL:  negative for fevers, chills, sweats, fatigue, weight loss  HEENT:  negative for vision, hearing changes, runny nose, throat pain  RESPIRATORY:  negative for shortness of breath, cough, congestion, wheezing.  CARDIOVASCULAR:  negative for chest pain, palpitations.  GASTROINTESTINAL:  negative for nausea, vomiting, diarrhea, constipation, change in bowel habits, abdominal pain   GENITOURINARY:  negative for difficulty of urination, burning with urination, frequency   INTEGUMENT:  negative for rash, skin lesions, easy bruising   HEMATOLOGIC/LYMPHATIC:  negative for swelling/edema   ALLERGIC/IMMUNOLOGIC:  negative for urticaria , itching  ENDOCRINE:  negative increase in drinking, increase in urination, hot or cold intolerance  MUSCULOSKELETAL: Hip pain improving  NEUROLOGICAL:  negative for headaches, dizziness, lightheadedness, numbness, pain, tingling extremities      Physical Exam:   BP (!) 149/47   Pulse 83   Temp 98.1 °F (36.7 °C)   Resp 20   Ht 1.6 m (5' 3\")   Wt 73.9 kg (163 lb)   SpO2 97%   BMI 28.87 kg/m²   Temp (24hrs), Av.2 °F (36.8 °C), Min:98.1 °F (36.7 °C), Max:98.4 °F (36.9 °C)    No results for input(s): \"POCGLU\" in the last 72 hours.  No intake or output data in the 24 hours ending 24 4171    General Appearance:  alert, well appearing, and in no acute distress  Mental status: oriented to person, place, and time with normal affect  Head:  normocephalic, atraumatic.  Eye: no icterus, redness, pupils equal and reactive,

## 2024-08-25 PROCEDURE — 6370000000 HC RX 637 (ALT 250 FOR IP): Performed by: INTERNAL MEDICINE

## 2024-08-25 PROCEDURE — 6370000000 HC RX 637 (ALT 250 FOR IP): Performed by: ORTHOPAEDIC SURGERY

## 2024-08-25 PROCEDURE — 99232 SBSQ HOSP IP/OBS MODERATE 35: CPT | Performed by: INTERNAL MEDICINE

## 2024-08-25 PROCEDURE — 97535 SELF CARE MNGMENT TRAINING: CPT

## 2024-08-25 PROCEDURE — 6370000000 HC RX 637 (ALT 250 FOR IP): Performed by: PHYSICAL MEDICINE & REHABILITATION

## 2024-08-25 PROCEDURE — 1180000000 HC REHAB R&B

## 2024-08-25 PROCEDURE — 97530 THERAPEUTIC ACTIVITIES: CPT

## 2024-08-25 PROCEDURE — 97110 THERAPEUTIC EXERCISES: CPT

## 2024-08-25 PROCEDURE — 6370000000 HC RX 637 (ALT 250 FOR IP): Performed by: STUDENT IN AN ORGANIZED HEALTH CARE EDUCATION/TRAINING PROGRAM

## 2024-08-25 PROCEDURE — 99232 SBSQ HOSP IP/OBS MODERATE 35: CPT | Performed by: PHYSICAL MEDICINE & REHABILITATION

## 2024-08-25 PROCEDURE — 97116 GAIT TRAINING THERAPY: CPT

## 2024-08-25 RX ADMIN — ASPIRIN 81 MG: 81 TABLET, COATED ORAL at 07:33

## 2024-08-25 RX ADMIN — Medication 6 MG: at 21:42

## 2024-08-25 RX ADMIN — ASPIRIN 81 MG: 81 TABLET, COATED ORAL at 21:42

## 2024-08-25 RX ADMIN — TRAMADOL HYDROCHLORIDE 50 MG: 50 TABLET ORAL at 07:38

## 2024-08-25 RX ADMIN — FERROUS SULFATE TAB 325 MG (65 MG ELEMENTAL FE) 325 MG: 325 (65 FE) TAB at 17:08

## 2024-08-25 RX ADMIN — ACETAMINOPHEN 650 MG: 325 TABLET ORAL at 05:58

## 2024-08-25 RX ADMIN — FERROUS SULFATE TAB 325 MG (65 MG ELEMENTAL FE) 325 MG: 325 (65 FE) TAB at 07:33

## 2024-08-25 RX ADMIN — ACETAMINOPHEN 650 MG: 325 TABLET ORAL at 17:08

## 2024-08-25 RX ADMIN — ACETAMINOPHEN 650 MG: 325 TABLET ORAL at 00:02

## 2024-08-25 RX ADMIN — PANTOPRAZOLE SODIUM 40 MG: 40 TABLET, DELAYED RELEASE ORAL at 05:58

## 2024-08-25 RX ADMIN — CYANOCOBALAMIN TAB 1000 MCG 1000 MCG: 1000 TAB at 07:33

## 2024-08-25 ASSESSMENT — PAIN DESCRIPTION - LOCATION
LOCATION: GENERALIZED
LOCATION: GENERALIZED

## 2024-08-25 ASSESSMENT — PAIN SCALES - GENERAL
PAINLEVEL_OUTOF10: 2
PAINLEVEL_OUTOF10: 0
PAINLEVEL_OUTOF10: 2
PAINLEVEL_OUTOF10: 0
PAINLEVEL_OUTOF10: 0
PAINLEVEL_OUTOF10: 4
PAINLEVEL_OUTOF10: 7
PAINLEVEL_OUTOF10: 5

## 2024-08-25 ASSESSMENT — PAIN DESCRIPTION - DESCRIPTORS
DESCRIPTORS: ACHING
DESCRIPTORS: ACHING

## 2024-08-25 NOTE — PROGRESS NOTES
[facility]   Acute Rehabilitation Occupational Therapy Daily Treatment Note    Date: 24  Patient Name: Amina Woods       Room: 2609/2609-01  MRN: [id number]  Account: [id number]   : 1935  (89 y.o.) Gender: female       Referring Practitioner: Meghan Mcmahon MD  Diagnosis: S/P hip replacement, right  Additional Pertinent Hx: Per H&P: Amina Woods is a 89 y.o. female with history of psoriasis, HLD admitted to [facility] on 2024.  She was originally admitted to [facility] on 24.     She initially presented for surgical intervention for right hip osteoarthritis.  She underwent total hip arthroplasty on 24 (Dr. See).  She is WBAT to the right lower limb.  Rapid response/code blue was called on 24 for unresponsiveness but she never lost a pulse.  She was noted to have bradycardia and hypotension at that time.     She is currently requiring assistance for self-care activities and mobility prompting this admission.  She reports ongoing pain in the right lower limb.  She also notes right lower limb edema and constipation.  She denies any chest pain, palpitations, and shortness of breath    Treatment Diagnosis: Impaired self-care status    Past Medical History:  has a past medical history of Basal cell carcinoma, Blood in feces, Hyperlipidemia, Mild hyperglycemia., Orthostatic hypotension, Osteoarthritis, Post-menopausal, Pre-cancerous skin lesions., Prolonged emergence from general anesthesia, Psoriasis, and Syncope, non cardiac.    Past Surgical History:   has a past surgical history that includes Hysterectomy; Colon surgery; Colonoscopy (, ); joint replacement (); Foot surgery; Appendectomy; Skin cancer excision; Skin cancer destruction; sigmoidoscopy (); Cataract removal with implant (Bilateral); and Total hip arthroplasty (Right, 2024).    Restrictions  Restrictions/Precautions  Restrictions/Precautions:  reachers, shoe horn to don shoes.  Education Method: Verbal;Demonstration  Education Outcome: Verbalized understanding;Demonstrated understanding;Continued education needed         Safety Devices  Type of devices: Left in chair;Call light within reach;Chair alarm in place (recliner chair (early mobility chair)  with LE elevated on footstool and pillow.)       Goals  Patient Goals   Patient goals : \"To get better!\"  Short Term Goals  Time Frame for Short Term Goals: By 1 week  Short Term Goal 1: Pt will complete UB bathing/dressing with setup and good safety  Short Term Goal 2: Pt will complete LB bathing/dressing/toileting with Min A, good safety, and use of AE/DME/Modified techniques as needed  Short Term Goal 3: Pt will complete functional transfers/mobility with SBA, good safety, and use of least restrictive device  Short Term Goal 4: Pt will actively participate in 30+ minutes of therapeutic exercise/functional activity/social participation for increased safety/independence with self-care and improved quality of life  Short Term Goal 5: Pt will tolerate static/dynamic standing for 6+ minutes with SBA during self-care/functional activity for improved balance/activity tolerance  Short Term Goal 6: Pt will be educated on and explore use of AE/DME/Modified techniques as needed for improved safety and independence with self-care tasks  Short Term Goal 7: Pt will verbalize/demonstrate good understanding of EC/WS strategies for improved safety/independence with self-care    Long Term Goals  Time Frame for Long Term Goals : By discharge  Long Term Goal 1: Pt will complete BADLs with Mod I, good safety, and use of AE/DME/Modified techniques as needd  Long Term Goal 2: Pt will complete functional transfers/mobility with Mod I, good safety, and use of AE/DME/Modified techniques as needed  Long Term Goal 3: Pt will tolerate dynamic standing 12+ minutes with Mod I during self-care/functional activity for improved

## 2024-08-25 NOTE — PLAN OF CARE
Problem: Discharge Planning  Goal: Discharge to home or other facility with appropriate resources  8/25/2024 0010 by Brooke Villegas LPN  Outcome: Progressing     Problem: Skin/Tissue Integrity  Goal: Absence of new skin breakdown  Description: 1.  Monitor for areas of redness and/or skin breakdown  2.  Assess vascular access sites hourly  3.  Every 4-6 hours minimum:  Change oxygen saturation probe site  4.  Every 4-6 hours:  If on nasal continuous positive airway pressure, respiratory therapy assess nares and determine need for appliance change or resting period.  8/25/2024 0010 by Brooke Villegas LPN  Outcome: Progressing     Problem: Safety - Adult  Goal: Free from fall injury  8/25/2024 0010 by Brooke Villegas LPN  Outcome: Progressing     Problem: ABCDS Injury Assessment  Goal: Absence of physical injury  8/25/2024 0010 by Brooke Villegas LPN  Outcome: Progressing     Problem: Pain  Goal: Verbalizes/displays adequate comfort level or baseline comfort level  8/25/2024 0010 by Brooke Villegas LPN  Outcome: Progressing     Problem: Nutrition Deficit:  Goal: Optimize nutritional status  8/25/2024 0010 by Brooke Villegas LPN  Outcome: Progressing

## 2024-08-25 NOTE — PROGRESS NOTES
[facility]   IN-PATIENT SERVICE   [facility]    HISTORY AND PHYSICAL EXAMINATION            Date:   8/25/2024  Patient name:  Amina Woods  Date of admission:  8/18/2024 11:59 AM  MRN:   [id number]  Account:  [id number]  YOB: 1935  PCP:    Paradise Estrella MD  Room:   [address] [place] [address]  Code Status:    Full Code    Chief Complaint:     Medical management    History Obtained From:     patient    History of Present Illness:     The patient is a 89 y.o.  Non- / non  female who presents with No chief complaint on file.   and she is admitted to the hospital for the management of medical issues.    89-year-old female with history of orthostatic hypotension, psoriasis, vascular syncope, initially presented for surgical intervention of the right hip osteoarthritis, underwent total hip arthroplasty on 8/13/2024.  Rapid response was called on 8/14/2024 as she was unresponsive, never lost a pulse.  She was noted to be bradycardic and hypotensive at that time.  When seen today the patient denies any acute concerns, hip pain is improving.    Past Medical History:     Past Medical History:   Diagnosis Date    Basal cell carcinoma     mulitple    Blood in feces 09/19/2016    Hyperlipidemia 08/30/2011    Mild hyperglycemia. 08/30/2011    Orthostatic hypotension 08/30/2011    Osteoarthritis     knees, thumbs    Post-menopausal     Pre-cancerous skin lesions. 08/30/2011    Prolonged emergence from general anesthesia     Psoriasis 08/30/2011    Syncope, non cardiac     \"vascular syncope\" per patient - last episode was 2023        Past Surgical History:     Past Surgical History:   Procedure Laterality Date    APPENDECTOMY      during hyst    CATARACT REMOVAL WITH IMPLANT Bilateral     COLON SURGERY      fistula repair- pt not sure    COLONOSCOPY  1997, 2002    FOOT SURGERY      [facility]    HYSTERECTOMY (CERVIX STATUS UNKNOWN)      complete, d/t fibroids    JOINT REPLACEMENT

## 2024-08-25 NOTE — PROGRESS NOTES
continues with narrow JUANA with bilat toe out, forward flexed posture with head and gaze downward, improved stablity.  Gait Deviations: Decreased head and trunk rotation, Slow Tiesha, Decreased step height  Distance: 160ft x2 in PM  Comments: Pt improving step length on L LE with heel toe gait.    Stairs  # Steps : 5  Stairs Height:  (4\"/6\")  Rails: Bilateral  Curbs: 6\"  Device: Rolling walker  Assistance: Contact guard assistance  Comment: pt with good teachback of stair navigation, safe technique, with no LOB.           OT:      Feeding  Assistance Level: Independent  Skilled Clinical Factors: Per pt report.  Grooming/Oral Hygiene  Assistance Level: Modified independent  Skilled Clinical Factors: Completes seated at sink  Upper Extremity Bathing  Assistance Level: Modified independent  Skilled Clinical Factors: Completes sitting w/c level at sink  Lower Extremity Bathing  Equipment Provided: Long-handled sponge  Assistance Level: Minimal assistance  Skilled Clinical Factors: Pt able to wash buttcoks/dorcas are while standing at GB with CGA. Requires min A for thoroughness.  Upper Extremity Dressing  Assistance Level: Supervision  Skilled Clinical Factors: Completes seated in w/c.  Lower Extremity Dressing  Assistance Level: Minimal assistance  Skilled Clinical Factors: Pt. requiring assist with threading of R LE through pull up and shorts with use of reacher, SBA for clothing management up over hips while standing at sink at this time.  Putting On/Taking Off Footwear  Assistance Level: Maximum assistance  Skilled Clinical Factors: TA for TEDs. A for donning R shoe, A for tying both shoes.  was able to doff R shoe with assist to untie laces and use of shoe horn, with min- mod A.    pt is able to complete figure 4 to reach to don, doff L shoe, but unable to reach with BUE to tie shoe.   elastic shoe laces provided for B shoes, after laces, pt needed min assist to don over L foot.  after practice should be indep with R  pantoprazole  40 mg Oral QAM AC     Continuous Infusions:  PRN Meds:.melatonin, senna, bisacodyl, midodrine, traMADol **OR** traMADol, docusate sodium     Diagnostics:     CBC:   Recent Labs     08/24/24  0527   WBC 7.0   RBC 2.75*   HGB 8.3*   HCT 24.5*   MCV 89.3   RDW 13.6        BMP:   Recent Labs     08/24/24  0527      K 4.7      CO2 32*   BUN 15   CREATININE 0.8     BNP: No results for input(s): \"BNP\" in the last 72 hours.  PT/INR: No results for input(s): \"PROTIME\", \"INR\" in the last 72 hours.  APTT: No results for input(s): \"APTT\" in the last 72 hours.  CARDIAC ENZYMES: No results for input(s): \"CKMB\", \"CKMBINDEX\", \"TROPONINT\" in the last 72 hours.    Invalid input(s): \"CKTOTAL;3\"  FASTING LIPID PANEL:  Lab Results   Component Value Date    CHOL 218 06/04/2018    HDL 43 06/04/2018    TRIG 198 06/04/2018     LIVER PROFILE: No results for input(s): \"AST\", \"ALT\", \"BILIDIR\", \"BILITOT\", \"ALKPHOS\" in the last 72 hours.    Invalid input(s): \"ALB\"     I/O (24Hr):  No intake or output data in the 24 hours ending 08/25/24 1010    Glu last 24 hour  No results for input(s): \"POCGLU\" in the last 72 hours.    No results for input(s): \"CLARITYU\", \"COLORU\", \"PHUR\", \"SPECGRAV\", \"PROTEINU\", \"RBCUA\", \"BLOODU\", \"BACTERIA\", \"NITRU\", \"WBCUA\", \"LEUKOCYTESUR\", \"YEAST\", \"GLUCOSEU\", \"BILIRUBINUR\" in the last 72 hours.      Impression/Plan:    Right hip osteoarthritis:  S/p total hip arthroplasty on 8/13/24 (Dr. See).  PT/OT for gait, mobility, strengthening, endurance, ADLs, and self care.  WBAT to the right lower limb.    Pain control with scheduled tylenol, as-needed tramadol. Ice prn pain/swelling.  Recent bradycardia, hypotension:  Improved.  Has midodrine as needed minimal use, cardiology following  Anemia:  Hemoglobin 8.3 low but stable.  Monitoring.  Noted start of iron and iron studies ordered  Psoriasis: stable. No current medication.  HLD:  Not currently on medication  Bowel Management: Docusate prn,

## 2024-08-25 NOTE — PLAN OF CARE
Problem: Safety - Adult  Goal: Free from fall injury  Flowsheets (Taken 8/25/2024 1809)  Free From Fall Injury:   Instruct family/caregiver on patient safety   Based on caregiver fall risk screen, instruct family/caregiver to ask for assistance with transferring infant if caregiver noted to have fall risk factors

## 2024-08-25 NOTE — PROGRESS NOTES
Physical Therapy  Facility/Department: [facility]  Rehabilitation Physical Therapy Progress Note    NAME: Amina Woods  : 1935 (89 y.o.)  MRN: [id number]  CODE STATUS: Full Code    Date of Service: 24      Past Medical History:   Diagnosis Date    Basal cell carcinoma     mulitple    Blood in feces 2016    Hyperlipidemia 2011    Mild hyperglycemia. 2011    Orthostatic hypotension 2011    Osteoarthritis     knees, thumbs    Post-menopausal     Pre-cancerous skin lesions. 2011    Prolonged emergence from general anesthesia     Psoriasis 2011    Syncope, non cardiac     \"vascular syncope\" per patient - last episode was      Past Surgical History:   Procedure Laterality Date    APPENDECTOMY      during [facility]    CATARACT REMOVAL WITH IMPLANT Bilateral     COLON SURGERY      fistula repair- pt not sure    COLONOSCOPY  ,     FOOT SURGERY      [facility]    HYSTERECTOMY (CERVIX STATUS UNKNOWN)      complete, d/t fibroids    JOINT REPLACEMENT      Right and left knees    SIGMOIDOSCOPY      SKIN CANCER DESTRUCTION      basal cell    SKIN CANCER EXCISION      basal cell    TOTAL HIP ARTHROPLASTY Right 2024    HIP TOTAL ARTHROPLASTY ASI performed by Aristeo See MD at [facility] OR       Chart Reviewed: Yes  Patient assessed for rehabilitation services?: Yes  Additional Pertinent Hx: Per Ortho note Patient is a 89 y.o. 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 The patient has failed all types of conservative treatments including physical therapy, NSAIDs, weight loss counseling, and intra-articular steroid injection. The patient's activities of daily living have become significantly restricted. Having failed conservative treatment, the patient has agreed to proceed  with total hip arthroplasty aware of all risks highlighted in the surgical consent form.  Family / Caregiver Present: Yes (3 daughters)  Referring Practitioner: Mayi  PROM Hip flex x20  A/AROM Exercises: R LE AROM x20 LAQ  Resistive Exercises: Seated B LE ex's with #2 on L LE, LGTB on B LE x20  Exercise Equipment: GenerationOne level 3, 10 minutes    ASSESSMENT    Activity Tolerance  Activity Tolerance: Patient limited by endurance;Patient limited by fatigue;Patient limited by pain    Assessment  Treatment Diagnosis: Impaired functional mobility and strength secondary to R TKA.  Therapy Prognosis: Good  Decision Making: Medium Complexity  History: R JORJE  Exam: decreased balance, endurance, ROM, strength, mobility  Clinical Presentation: evolving  Barriers to Learning: none  Discharge Recommendations: Home with assist PRN  PT Equipment Recommendations  Equipment Needed:  (TBD)      GOALS  Patient Goals   Patient Goals : To go home  Short Term Goals  Time Frame for Short Term Goals: 5-7 days  Short Term Goal 1: bed mobility with SBA  Short Term Goal 2: pt able to transfer from various surfaces with CGA  Short Term Goal 3: pt able to ambulate x 75ft with RW andf SBA  Short Term Goal 4: pt able to perform 1 curb sized step with RW and CGA  Short Term Goal 5: Pt to demonstrate increased dynamic sitting balance to GOOD- for the ease of daily activities  Long Term Goals  Time Frame for Long Term Goals : time of discharge  Long Term Goal 1: pt able to perform bed mobility mod-I  Long Term Goal 2: pt able to transfer from various surfaces mod-I  Long Term Goal 3: pt able to ambulate with RW x 150ft mod-I for community distances  Long Term Goal 4: pt able to perform a curb with RW mod-I for community mobility  Long Term Goal 5: Dynamic standing balance to Good for safe ADLs  Additional Goals?: Yes    PLAN OF CARE  Frequency: 1-2 treatment sessions per day, 5-7 days per week  Physical Therapy Plan  General Plan:  minutes of therapy at least 5 out of 7 days a week  Safety Devices  Type of Devices: Call light within reach;Left in chair;Chair alarm in place  Restraints  Restraints Initially in

## 2024-08-26 LAB
ANION GAP SERPL CALCULATED.3IONS-SCNC: 10 MMOL/L (ref 9–17)
BASOPHILS # BLD: 0 K/UL (ref 0–0.2)
BASOPHILS NFR BLD: 0 % (ref 0–2)
BUN SERPL-MCNC: 15 MG/DL (ref 8–23)
CALCIUM SERPL-MCNC: 8.9 MG/DL (ref 8.6–10.4)
CHLORIDE SERPL-SCNC: 100 MMOL/L (ref 98–107)
CO2 SERPL-SCNC: 29 MMOL/L (ref 20–31)
CREAT SERPL-MCNC: 0.8 MG/DL (ref 0.5–0.9)
EOSINOPHIL # BLD: 0.4 K/UL (ref 0–0.4)
EOSINOPHILS RELATIVE PERCENT: 4 % (ref 0–4)
ERYTHROCYTE [DISTWIDTH] IN BLOOD BY AUTOMATED COUNT: 13.9 % (ref 11.5–14.9)
FOLATE SERPL-MCNC: 14 NG/ML (ref 4.8–24.2)
GFR, ESTIMATED: 70 ML/MIN/1.73M2
GLUCOSE SERPL-MCNC: 102 MG/DL (ref 70–99)
HCT VFR BLD AUTO: 26.2 % (ref 36–46)
HGB BLD-MCNC: 8.7 G/DL (ref 12–16)
IRON SATN MFR SERPL: 9 % (ref 20–55)
IRON SERPL-MCNC: 26 UG/DL (ref 37–145)
LYMPHOCYTES NFR BLD: 1.2 K/UL (ref 1–4.8)
LYMPHOCYTES RELATIVE PERCENT: 15 % (ref 24–44)
MCH RBC QN AUTO: 29.8 PG (ref 26–34)
MCHC RBC AUTO-ENTMCNC: 33.3 G/DL (ref 31–37)
MCV RBC AUTO: 89.6 FL (ref 80–100)
MONOCYTES NFR BLD: 0.6 K/UL (ref 0.1–1.3)
MONOCYTES NFR BLD: 7 % (ref 1–7)
NEUTROPHILS NFR BLD: 74 % (ref 36–66)
NEUTS SEG NFR BLD: 5.9 K/UL (ref 1.3–9.1)
PLATELET # BLD AUTO: 448 K/UL (ref 150–450)
PMV BLD AUTO: 6.6 FL (ref 6–12)
POTASSIUM SERPL-SCNC: 5.1 MMOL/L (ref 3.7–5.3)
RBC # BLD AUTO: 2.92 M/UL (ref 4–5.2)
SODIUM SERPL-SCNC: 139 MMOL/L (ref 135–144)
TIBC SERPL-MCNC: 284 UG/DL (ref 250–450)
UNSATURATED IRON BINDING CAPACITY: 258 UG/DL (ref 112–347)
VIT B12 SERPL-MCNC: 622 PG/ML (ref 232–1245)
WBC OTHER # BLD: 8.1 K/UL (ref 3.5–11)

## 2024-08-26 PROCEDURE — 80048 BASIC METABOLIC PNL TOTAL CA: CPT

## 2024-08-26 PROCEDURE — 83550 IRON BINDING TEST: CPT

## 2024-08-26 PROCEDURE — 97116 GAIT TRAINING THERAPY: CPT

## 2024-08-26 PROCEDURE — 85025 COMPLETE CBC W/AUTO DIFF WBC: CPT

## 2024-08-26 PROCEDURE — 1180000000 HC REHAB R&B

## 2024-08-26 PROCEDURE — 6370000000 HC RX 637 (ALT 250 FOR IP): Performed by: INTERNAL MEDICINE

## 2024-08-26 PROCEDURE — 97530 THERAPEUTIC ACTIVITIES: CPT

## 2024-08-26 PROCEDURE — 99232 SBSQ HOSP IP/OBS MODERATE 35: CPT | Performed by: STUDENT IN AN ORGANIZED HEALTH CARE EDUCATION/TRAINING PROGRAM

## 2024-08-26 PROCEDURE — 97110 THERAPEUTIC EXERCISES: CPT

## 2024-08-26 PROCEDURE — 36415 COLL VENOUS BLD VENIPUNCTURE: CPT

## 2024-08-26 PROCEDURE — 6370000000 HC RX 637 (ALT 250 FOR IP): Performed by: ORTHOPAEDIC SURGERY

## 2024-08-26 PROCEDURE — 83540 ASSAY OF IRON: CPT

## 2024-08-26 PROCEDURE — 99232 SBSQ HOSP IP/OBS MODERATE 35: CPT | Performed by: INTERNAL MEDICINE

## 2024-08-26 PROCEDURE — 6370000000 HC RX 637 (ALT 250 FOR IP): Performed by: PHYSICAL MEDICINE & REHABILITATION

## 2024-08-26 PROCEDURE — 82607 VITAMIN B-12: CPT

## 2024-08-26 PROCEDURE — 82746 ASSAY OF FOLIC ACID SERUM: CPT

## 2024-08-26 RX ADMIN — ACETAMINOPHEN 650 MG: 325 TABLET ORAL at 21:41

## 2024-08-26 RX ADMIN — ACETAMINOPHEN 650 MG: 325 TABLET ORAL at 06:11

## 2024-08-26 RX ADMIN — ACETAMINOPHEN 650 MG: 325 TABLET ORAL at 17:02

## 2024-08-26 RX ADMIN — PANTOPRAZOLE SODIUM 40 MG: 40 TABLET, DELAYED RELEASE ORAL at 06:11

## 2024-08-26 RX ADMIN — CYANOCOBALAMIN TAB 1000 MCG 1000 MCG: 1000 TAB at 07:21

## 2024-08-26 RX ADMIN — FERROUS SULFATE TAB 325 MG (65 MG ELEMENTAL FE) 325 MG: 325 (65 FE) TAB at 07:21

## 2024-08-26 RX ADMIN — ACETAMINOPHEN 650 MG: 325 TABLET ORAL at 10:24

## 2024-08-26 RX ADMIN — ASPIRIN 81 MG: 81 TABLET, COATED ORAL at 21:41

## 2024-08-26 RX ADMIN — FERROUS SULFATE TAB 325 MG (65 MG ELEMENTAL FE) 325 MG: 325 (65 FE) TAB at 17:02

## 2024-08-26 RX ADMIN — ASPIRIN 81 MG: 81 TABLET, COATED ORAL at 07:21

## 2024-08-26 RX ADMIN — Medication 6 MG: at 21:41

## 2024-08-26 ASSESSMENT — PAIN SCALES - GENERAL
PAINLEVEL_OUTOF10: 0

## 2024-08-26 NOTE — PROGRESS NOTES
Physical Medicine & Rehabilitation  Progress Note      Subjective:      Amina Woods is a 89 y.o. female with right hip osteoarthritis s/p total hip arthroplasty.    She reports doing well today.  She notes minimal right hip pain.  She does state that she feels tired after therapies.  She reports not feeling well last night, which is resolved this morning.  She denies any other acute concerns.    ROS:  Denies fevers, chills, sweats.  No chest pain, palpitations, lightheadedness.  Denies coughing, wheezing or shortness of breath.  Denies abdominal pain, nausea, diarrhea or constipation.  No new areas of joint pain.  Denies new areas of numbness or weakness.  Denies new anxiety or depression issues.  No new skin problems.    Rehabilitation:     Physical Therapy    Restrictions/Precautions: Weight Bearing, General Precautions, Fall Risk  Implants present? : Metal implants  Hip Precautions: Anterior hip precautions  Other position/activity restrictions: No straight leg raises  Right Lower Extremity Weight Bearing: Weight Bearing As Tolerated    Bed mobility  Rolling to Left: Supervision  Rolling to Right: Supervision  Supine to Sit: Minimal assistance (needed Right leg progression)  Sit to Supine: Supervision  Scooting: Supervision (to EOB)  Bed Mobility Comments: Pt completing bed mobility x2 with HOB flat, no bed rails, and height raised to 16\" to simulate and approximate bed set up at home.    Transfers  Sit to Stand: Supervision  Stand to Sit: Supervision  Bed to Chair: Supervision  Stand Pivot Transfers: Supervision  Comment: With RW    WB Status: WBAT RLE no SLR  Ambulation  Surface: Level tile  Device: Rolling Walker  Other Apparatus:  (writer pulling w/c)  Assistance: Stand by assistance  Quality of Gait: antalgic gait, short step length on left and longer stride on right, forward flexed posture with head and gaze downward, bilat toe out with heels almost touching, no heel/toe gait and unsteady high fall

## 2024-08-26 NOTE — PROGRESS NOTES
She had episode of bradycardia with hypotension and syncope.  It happened after ambulating to the bathroom.  Seen by ER physician Abbi Coma Scale was 15 she followed commands no hemiparesis no facial droop no dysphagia.  She had transient bradycardia which resolved.  Heart rate improved to 70 bpm and blood pressure was 110/50.  No arrhythmias noted.  Blood glucose was within normal range     Lab work 8/14/2024  proBNP 1798  Sodium 134, potassium 4.1, BUN 17, creatinine 0.9, glucose 106  Hemoglobin 8.9, WBC 9.8, platelets 230     Current evaluation     Patient seen and examined  She was resting on chair  She tolerated physiotherapy in the morning and afternoon  She denied any chest pain or palpitation or dizziness  She walked a many steps today outside of the hospital  She did not sleep well last  Neck veins: Normal no obvious sign of pleural effusion  Right leg edema is improved    Blood pressure 130/52 heart rate 88, oxygen saturation 96%  Lab work 8/26/2024  Sodium 139, potassium 5.1, BUN 15, creatinine 0.8, glucose 102, calcium 8.9  Hemoglobin 8.7, WBC 8.1, platelets 440, B12 622    Lab work 8/24/2024  proBNP 878 potassium 4.5 creatinine 0.8      Medications:   Scheduled Meds:   ferrous sulfate  325 mg Oral BID WC    vitamin B-12  1,000 mcg Oral Daily    acetaminophen  650 mg Oral Q6H    aspirin  81 mg Oral BID    pantoprazole  40 mg Oral QAM AC     Continuous Infusions:  CBC:   Recent Labs     08/24/24  0527 08/26/24  0646   WBC 7.0 8.1   HGB 8.3* 8.7*    448     BMP:    Recent Labs     08/24/24  0527 08/26/24  0646    139   K 4.7 5.1    100   CO2 32* 29   BUN 15 15   CREATININE 0.8 0.8   GLUCOSE 109* 102*     Hepatic: No results for input(s): \"AST\", \"ALT\", \"BILITOT\", \"ALKPHOS\" in the last 72 hours.    Invalid input(s): \"ALB\"  Troponin: No results for input(s): \"TROPONINI\" in the last 72 hours.  BNP: No results for input(s): \"BNP\" in the last 72 hours.  Lipids: No results for input(s):  \"CHOL\", \"HDL\" in the last 72 hours.    Invalid input(s): \"LDLCALCU\"  INR: No results for input(s): \"INR\" in the last 72 hours.    Objective:   Vitals: BP (!) 129/52   Pulse 88   Temp 98.6 °F (37 °C) (Oral)   Resp 16   Ht 1.6 m (5' 3\")   Wt 73.9 kg (163 lb)   SpO2 96%   BMI 28.87 kg/m²   General appearance: alert and cooperative with exam  HEENT: Head: Normal, normocephalic, atraumatic.  Neck: no JVD and supple, symmetrical, trachea midline  Lungs: diminished breath sounds bibasilar  Heart: regular rate and rhythm  Abdomen:  Obese soft bowel sounds present  Extremities:  Right thigh edema  Neurologic: Mental status: Alert, oriented, thought content appropriate    EKG: Sinus rhythm with right bundle branch.  ECHO: reviewed.   Ejection fraction: 60%, mild LVH no valvular abnormality normal IVC dimension  Stress Test: reviewed.  Cardiac Angiography: not obtained.        Assessment / Acute Cardiac Problems:     8/14/2024 at about 10 AM syncopal episode while resting on chair after coming back from bathroom, transient bradycardia, hypotension  No seizure activity no neurodeficit  Heart rate blood pressure got stabilized within short time  No chest pain  No ischemic ECG changes  Right hip replacement 8/13/2024  Previous history of postural hypotension on midodrine 2.5 mg p.o. as needed  No history of coronary intervention  Conduction disorder, right bundle branch block      Patient Active Problem List:     Osteoarthritis     Hyperlipidemia     Mild hyperglycemia.     Hypotension     Psoriasis     Pre-cancerous skin lesions.     Elevated blood pressure reading     S/P total knee replacement     Disorder of lipid metabolism     Anemia     Dizziness     Acid reflux     Osteopenia     Vasovagal syncope     Vitamin D deficiency     Drug-induced constipation     Lumbar radiculopathy     Osteoarthritis of right hip     Primary osteoarthritis of right hip     Debility     S/P hip replacement, right      Plan of Treatment:

## 2024-08-26 NOTE — PLAN OF CARE
Problem: Discharge Planning  Goal: Discharge to home or other facility with appropriate resources  Outcome: Progressing     Problem: Skin/Tissue Integrity  Goal: Absence of new skin breakdown  Description: 1.  Monitor for areas of redness and/or skin breakdown  2.  Assess vascular access sites hourly  3.  Every 4-6 hours minimum:  Change oxygen saturation probe site  4.  Every 4-6 hours:  If on nasal continuous positive airway pressure, respiratory therapy assess nares and determine need for appliance change or resting period.  Outcome: Progressing     Problem: Safety - Adult  Goal: Free from fall injury  8/26/2024 0145 by Brooke Villegas LPN  Outcome: Progressing     Problem: ABCDS Injury Assessment  Goal: Absence of physical injury  Outcome: Progressing     Problem: Pain  Goal: Verbalizes/displays adequate comfort level or baseline comfort level  Outcome: Progressing     Problem: Nutrition Deficit:  Goal: Optimize nutritional status  Outcome: Progressing

## 2024-08-26 NOTE — PLAN OF CARE
Problem: Discharge Planning  Goal: Discharge to home or other facility with appropriate resources  8/26/2024 1017 by Lauren Reyes, RN  Outcome: Progressing     Problem: Skin/Tissue Integrity  Goal: Absence of new skin breakdown  Description: 1.  Monitor for areas of redness and/or skin breakdown  2.  Assess vascular access sites hourly  3.  Every 4-6 hours minimum:  Change oxygen saturation probe site  4.  Every 4-6 hours:  If on nasal continuous positive airway pressure, respiratory therapy assess nares and determine need for appliance change or resting period.  8/26/2024 1017 by Lauren Reyes, RN  Outcome: Progressing     Problem: Safety - Adult  Goal: Free from fall injury  8/26/2024 1017 by Lauren Reyes, RN  Outcome: Progressing     Problem: ABCDS Injury Assessment  Goal: Absence of physical injury  8/26/2024 1017 by Lauren Reyes, RN  Outcome: Progressing     Problem: Pain  Goal: Verbalizes/displays adequate comfort level or baseline comfort level  8/26/2024 1017 by Lauren Reyes, RN  Outcome: Progressing     Problem: Nutrition Deficit:  Goal: Optimize nutritional status  8/26/2024 1017 by Lauren Reyes, RN  Outcome: Progressing

## 2024-08-26 NOTE — PATIENT CARE CONFERENCE
[facility]  TEAM CONFERENCE NOTE  Date: 24  Patient Name: Amina Woods       Room: 2609/2609-01  MRN: [id number]       : 1935  (89 y.o.)     Gender: female     Referring Practitioner: Aristeo See MD     PRINCIPAL DIAGNOSIS: S/P hip replacement, right    NURSING--------------------------------------------------------------------------------    Bladder Continence  Always Continent  Bowel Continence Always Continent    Date of Last BM: 2024    Bladder/Bowel Program Interventions: No Bladder or Bowel Program Indicated    Wounds/Incisions/Ulcers:  Surgical incision with Aquacell Dressing on not to be removed   some old drainage noted       Pain Control: Patient's pain currently controlled and pain regimen effective as ordered    Pain Medication Regimen Usage Pattern: MAR reviewed and pain medications are being used at the following frequency (Specify Medication, # Doses Administered on average per day, identified patterns of use - for example: time of day, prior to activity/therapy)  Taking scheduled Tylenol only    Tramadol ordered patient not using at this time    Fall Risk:  Falling star program initiated    Medication Education Program: Patient able to manage medications and being educated by nursing    Discharge Preparation Patient/Responsible Party Education In Progress:   Wound Care    Nursing specific communication for TEAM: No additional information identified requiring communication at this time    PHYSICAL THERAPY------------------------------------------------------------------      Bed Mobility:        Rolling to Left: Supervision  Rolling to Right: Supervision  Supine to Sit: Supervision/min A-at times some assist needed to progress R LE  Sit to Supine: Supervision  Scooting: Supervision (to EOB)  Bed Mobility Comments: Pt completing bed mobility x2 with HOB flat, no bed rails, and height raised to 16\" to simulate and approximate bed set

## 2024-08-26 NOTE — PROGRESS NOTES
[facility]   IN-PATIENT SERVICE   [facility]    HISTORY AND PHYSICAL EXAMINATION            Date:   8/26/2024  Patient name:  Amina Woods  Date of admission:  8/18/2024 11:59 AM  MRN:   [id number]  Account:  [id number]  YOB: 1935  PCP:    Paradise Estrella MD  Room:   [address] [place] [address]  Code Status:    Full Code    Chief Complaint:     Medical management    History Obtained From:     patient    History of Present Illness:     The patient is a 89 y.o.  Non- / non  female who presents with No chief complaint on file.   and she is admitted to the hospital for the management of medical issues.    89-year-old female with history of orthostatic hypotension, psoriasis, vascular syncope, initially presented for surgical intervention of the right hip osteoarthritis, underwent total hip arthroplasty on 8/13/2024.  Rapid response was called on 8/14/2024 as she was unresponsive, never lost a pulse.  She was noted to be bradycardic and hypotensive at that time.  When seen today the patient denies any acute concerns, hip pain is improving.    Past Medical History:     Past Medical History:   Diagnosis Date    Basal cell carcinoma     mulitple    Blood in feces 09/19/2016    Hyperlipidemia 08/30/2011    Mild hyperglycemia. 08/30/2011    Orthostatic hypotension 08/30/2011    Osteoarthritis     knees, thumbs    Post-menopausal     Pre-cancerous skin lesions. 08/30/2011    Prolonged emergence from general anesthesia     Psoriasis 08/30/2011    Syncope, non cardiac     \"vascular syncope\" per patient - last episode was 2023        Past Surgical History:     Past Surgical History:   Procedure Laterality Date    APPENDECTOMY      during hyst    CATARACT REMOVAL WITH IMPLANT Bilateral     COLON SURGERY      fistula repair- pt not sure    COLONOSCOPY  1997, 2002    FOOT SURGERY      [facility]    HYSTERECTOMY (CERVIX STATUS UNKNOWN)      complete, d/t fibroids    JOINT REPLACEMENT   CONSULT TO INTERNAL MEDICINE  IP CONSULT TO CARDIOLOGY     Patient is admitted as inpatient status because of co-morbidities listed above, severity of signs and symptoms as outlined, requirement for current medical therapies and most importantly because of direct risk to patient if care not provided in a hospital setting.    Stefania Brown MD  8/26/2024  3:41 PM    Copy sent to Paradise Welsh MD    Please note that this chart was generated using voice recognition Dragon dictation software.  Although every effort was made to ensure the accuracy of this automated transcription, some errors in transcription may have occurred.

## 2024-08-26 NOTE — PROGRESS NOTES
Physical Therapy  Facility/Department: [facility]UTE [facility]  Rehabilitation Physical Therapy     NAME: Amina Woods  : 1935 (89 y.o.)  MRN: [id number]  CODE STATUS: Full Code    Date of Service: 24      Past Medical History:   Diagnosis Date    Basal cell carcinoma     mulitple    Blood in feces 2016    Hyperlipidemia 2011    Mild hyperglycemia. 2011    Orthostatic hypotension 2011    Osteoarthritis     knees, thumbs    Post-menopausal     Pre-cancerous skin lesions. 2011    Prolonged emergence from general anesthesia     Psoriasis 2011    Syncope, non cardiac     \"vascular syncope\" per patient - last episode was      Past Surgical History:   Procedure Laterality Date    APPENDECTOMY      during [facility]    CATARACT REMOVAL WITH IMPLANT Bilateral     COLON SURGERY      fistula repair- pt not sure    COLONOSCOPY  ,     FOOT SURGERY      [facility]    HYSTERECTOMY (CERVIX STATUS UNKNOWN)      complete, d/t fibroids    JOINT REPLACEMENT      Right and left knees    SIGMOIDOSCOPY      SKIN CANCER DESTRUCTION      basal cell    SKIN CANCER EXCISION      basal cell    TOTAL HIP ARTHROPLASTY Right 2024    HIP TOTAL ARTHROPLASTY ASI performed by Aristeo See MD at [facility] OR       Chart Reviewed: Yes  Patient assessed for rehabilitation services?: Yes  Additional Pertinent Hx: Per Ortho note Patient is a 89 y.o. 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 The patient has failed all types of conservative treatments including physical therapy, NSAIDs, weight loss counseling, and intra-articular steroid injection. The patient's activities of daily living have become significantly restricted. Having failed conservative treatment, the patient has agreed to proceed  with total hip arthroplasty aware of all risks highlighted in the surgical consent form.  Family / Caregiver Present: Yes (3 daughters)  Referring Practitioner: Aristeo See

## 2024-08-26 NOTE — PROGRESS NOTES
[facility]   Acute Rehabilitation Occupational Therapy Daily Treatment Note    Date: 24  Patient Name: Amina Woods       Room: 2609/2609-01  MRN: [id number]  Account: [id number]   : 1935  (89 y.o.) Gender: female       Referring Practitioner: Meghan Mcmahon MD  Diagnosis: S/P hip replacement, right  Additional Pertinent Hx: Per H&P: Amina Woods is a 89 y.o. female with history of psoriasis, HLD admitted to [facility] on 2024.  She was originally admitted to [facility] on 24.     She initially presented for surgical intervention for right hip osteoarthritis.  She underwent total hip arthroplasty on 24 (Dr. See).  She is WBAT to the right lower limb.  Rapid response/code blue was called on 24 for unresponsiveness but she never lost a pulse.  She was noted to have bradycardia and hypotension at that time.     She is currently requiring assistance for self-care activities and mobility prompting this admission.  She reports ongoing pain in the right lower limb.  She also notes right lower limb edema and constipation.  She denies any chest pain, palpitations, and shortness of breath    Treatment Diagnosis: Impaired self-care status    Past Medical History:  has a past medical history of Basal cell carcinoma, Blood in feces, Hyperlipidemia, Mild hyperglycemia., Orthostatic hypotension, Osteoarthritis, Post-menopausal, Pre-cancerous skin lesions., Prolonged emergence from general anesthesia, Psoriasis, and Syncope, non cardiac.    Past Surgical History:   has a past surgical history that includes Hysterectomy; Colon surgery; Colonoscopy (, ); joint replacement (); Foot surgery; Appendectomy; Skin cancer excision; Skin cancer destruction; sigmoidoscopy (); Cataract removal with implant (Bilateral); and Total hip arthroplasty (Right, 2024).    Restrictions  Restrictions/Precautions  Restrictions/Precautions:

## 2024-08-27 PROCEDURE — 99232 SBSQ HOSP IP/OBS MODERATE 35: CPT | Performed by: INTERNAL MEDICINE

## 2024-08-27 PROCEDURE — 6370000000 HC RX 637 (ALT 250 FOR IP): Performed by: PHYSICAL MEDICINE & REHABILITATION

## 2024-08-27 PROCEDURE — 97535 SELF CARE MNGMENT TRAINING: CPT

## 2024-08-27 PROCEDURE — 97530 THERAPEUTIC ACTIVITIES: CPT

## 2024-08-27 PROCEDURE — 6370000000 HC RX 637 (ALT 250 FOR IP): Performed by: ORTHOPAEDIC SURGERY

## 2024-08-27 PROCEDURE — 97110 THERAPEUTIC EXERCISES: CPT

## 2024-08-27 PROCEDURE — 99232 SBSQ HOSP IP/OBS MODERATE 35: CPT | Performed by: STUDENT IN AN ORGANIZED HEALTH CARE EDUCATION/TRAINING PROGRAM

## 2024-08-27 PROCEDURE — 97116 GAIT TRAINING THERAPY: CPT

## 2024-08-27 PROCEDURE — 6370000000 HC RX 637 (ALT 250 FOR IP): Performed by: INTERNAL MEDICINE

## 2024-08-27 PROCEDURE — 1180000000 HC REHAB R&B

## 2024-08-27 RX ADMIN — FERROUS SULFATE TAB 325 MG (65 MG ELEMENTAL FE) 325 MG: 325 (65 FE) TAB at 16:29

## 2024-08-27 RX ADMIN — ACETAMINOPHEN 650 MG: 325 TABLET ORAL at 06:15

## 2024-08-27 RX ADMIN — ACETAMINOPHEN 650 MG: 325 TABLET ORAL at 20:42

## 2024-08-27 RX ADMIN — ACETAMINOPHEN 650 MG: 325 TABLET ORAL at 16:29

## 2024-08-27 RX ADMIN — ASPIRIN 81 MG: 81 TABLET, COATED ORAL at 20:40

## 2024-08-27 RX ADMIN — Medication 6 MG: at 20:41

## 2024-08-27 RX ADMIN — ASPIRIN 81 MG: 81 TABLET, COATED ORAL at 09:26

## 2024-08-27 RX ADMIN — CYANOCOBALAMIN TAB 1000 MCG 1000 MCG: 1000 TAB at 09:26

## 2024-08-27 RX ADMIN — ACETAMINOPHEN 650 MG: 325 TABLET ORAL at 11:29

## 2024-08-27 RX ADMIN — FERROUS SULFATE TAB 325 MG (65 MG ELEMENTAL FE) 325 MG: 325 (65 FE) TAB at 09:26

## 2024-08-27 RX ADMIN — PANTOPRAZOLE SODIUM 40 MG: 40 TABLET, DELAYED RELEASE ORAL at 06:15

## 2024-08-27 ASSESSMENT — PAIN SCALES - GENERAL
PAINLEVEL_OUTOF10: 0

## 2024-08-27 NOTE — PROGRESS NOTES
Physical Medicine & Rehabilitation  Progress Note      Subjective:      Amina Woods is a 89 y.o. female with right hip osteoarthritis s/p total hip arthroplasty.     She reports doing well today.  She states that she is working hard in therapy.  She notes minimal pain in the right hip.  She denies any other acute concerns.    ROS:  Denies fevers, chills, sweats.  No chest pain, palpitations, lightheadedness.  Denies coughing, wheezing or shortness of breath.  Denies abdominal pain, nausea, diarrhea or constipation.  No new areas of joint pain.  Denies new areas of numbness or weakness.  Denies new anxiety or depression issues.  No new skin problems.    Rehabilitation:     Physical Therapy    Restrictions/Precautions: Weight Bearing, General Precautions, Fall Risk  Implants present? : Metal implants  Hip Precautions: Anterior hip precautions  Other position/activity restrictions: No straight leg raises  Right Lower Extremity Weight Bearing: Weight Bearing As Tolerated    Bed mobility  Bridging: Supervision  Rolling to Left: Supervision  Rolling to Right: Supervision  Supine to Sit: Supervision (with leg )  Sit to Supine: Minimal assistance (used LL to assist RLE into bed and out of bed)  Scooting: Supervision  Bed Mobility Comments: patient attempting self assisting techniques to place RLE into bed with least need for assist. patient demostrated 5 attempts with repeative training. also attempt with low stool at foot of bed to advance floor to stool to bed unsuccessful independent attempt with use of leg .    Transfers  Sit to Stand: Supervision  Stand to Sit: Supervision  Bed to Chair: Supervision  Stand Pivot Transfers: Supervision  Comment: With RW    WB Status: WBAT RLE no SLR  Ambulation  Surface: Level tile  Device: Rolling Walker  Other Apparatus:  (writer pulling w/c)  Assistance: Supervision  Quality of Gait: antalgic gait, short step length on left and longer stride on right, forward  \"TROPONINT\" in the last 72 hours.    Invalid input(s): \"CKTOTAL;3\"  FASTING LIPID PANEL:  Lab Results   Component Value Date    CHOL 218 06/04/2018    HDL 43 06/04/2018    TRIG 198 06/04/2018     LIVER PROFILE: No results for input(s): \"AST\", \"ALT\", \"BILIDIR\", \"BILITOT\", \"ALKPHOS\" in the last 72 hours.    Invalid input(s): \"ALB\"       Reviewed notes from internal medicine, cardiology, PT, OT.      Impression/Plan:   Impaired ADLs, gait, and mobility due to:    Right hip osteoarthritis:  S/p total hip arthroplasty on 8/13/24 (Dr. See).  PT/OT for gait, mobility, strengthening, endurance, ADLs, and self care.  WBAT to the right lower limb.  Pain control with scheduled tylenol, as-needed tramadol. Ice prn pain/swelling.  Recent bradycardia, hypotension:  Improved.  Has midodrine as needed (not requiring).  Cardiology following  Anemia:  Hemoglobin 8.7 on 8/26, stable.  Monitoring.  Continue oral iron supplement.  Psoriasis: Stable. Not currently on medication.  HLD:  Not currently on medication  Bowel Management: Docusate prn, senokot prn, dulcolax prn.  DVT Prophylaxis:   Aspirin 81mg BID per orthopedics  Internal Medicine for medical management  Follow up PCP 1-2 weeks, Orthopedic Surgery - Dr. See 8/28/24  Discussed discharge date on 8/30/24 at team meeting - also discussed with patient      Electronically signed by Meghan Mcmahon MD on 8/27/2024 at 7:55 PM

## 2024-08-27 NOTE — PROGRESS NOTES
on 8/13/2024.  On 8/14/2024 at about 10 AM KYREE BAEZ was called.  She had episode of bradycardia with hypotension and syncope.  It happened after ambulating to the bathroom.  Seen by ER physician Abbi Coma Scale was 15 she followed commands no hemiparesis no facial droop no dysphagia.  She had transient bradycardia which resolved.  Heart rate improved to 70 bpm and blood pressure was 110/50.  No arrhythmias noted.  Blood glucose was within normal range     Lab work 8/14/2024  proBNP 1798  Sodium 134, potassium 4.1, BUN 17, creatinine 0.9, glucose 106  Hemoglobin 8.9, WBC 9.8, platelets 230     Current evaluation  Patient seen and examined  She was resting on chair getting ready to have her food  She did well during physiotherapy  No dizziness on standing  No chest pain no palpitation  Right leg edema is improving  Neck veins were normal  No obvious sign of pleural effusion    Blood pressure 110/50 heart rate 86, oxygen saturation 94%    Medications:   Scheduled Meds:   ferrous sulfate  325 mg Oral BID WC    vitamin B-12  1,000 mcg Oral Daily    acetaminophen  650 mg Oral Q6H    aspirin  81 mg Oral BID    pantoprazole  40 mg Oral QAM AC     Continuous Infusions:  CBC:   Recent Labs     08/26/24  0646   WBC 8.1   HGB 8.7*        BMP:    Recent Labs     08/26/24  0646      K 5.1      CO2 29   BUN 15   CREATININE 0.8   GLUCOSE 102*     Hepatic: No results for input(s): \"AST\", \"ALT\", \"BILITOT\", \"ALKPHOS\" in the last 72 hours.    Invalid input(s): \"ALB\"  Troponin: No results for input(s): \"TROPONINI\" in the last 72 hours.  BNP: No results for input(s): \"BNP\" in the last 72 hours.  Lipids: No results for input(s): \"CHOL\", \"HDL\" in the last 72 hours.    Invalid input(s): \"LDLCALCU\"  INR: No results for input(s): \"INR\" in the last 72 hours.    Objective:   Vitals: BP (!) 110/52   Pulse 98   Temp 98.1 °F (36.7 °C) (Oral)   Resp 18   Ht 1.6 m (5' 3\")   Wt 73.9 kg (163 lb)   SpO2 94%   BMI 28.87

## 2024-08-27 NOTE — CARE COORDINATION
[facility] CASE MANAGEMENT NOTE:    Admission Date:  8/18/2024 Amina Woods is a 89 y.o.  female    Admitted for : Debility [R53.81]    Patient has an appointment with Dr. See tomorrow at 1015. As the medical office building is connected with [facility], the patient will be taken over by staff. The plan will be to have PT use the walk to the office to help with therapy. Once the appointment is complete, the office staff will need to call [facility] and a staff member can go pick her back up. No other needs at this time.    Will continue to follow for additional discharge needs.    Electronically signed by Rabia Hatch RN on 8/27/2024 at 4:19 PM

## 2024-08-27 NOTE — PROGRESS NOTES
by assist  Skilled Clinical Factors: SBA/SUP. Good hand placement noted.  Stand to Sit  Assistance Level: Stand by assist  Skilled Clinical Factors: SBA/SUP. Good hand placement noted.    Functional Mobility  Device: Rolling walker  Activity: To/From bathroom;To/From therapy gym  Assistance Level: Stand by assist  Skilled Clinical Factors: Slow steady pace, good safety noted.         08/27/24 0803   OT Exercises   Dynamic Standing Balance Exercises PM: Pt instruction in dynamic standing tasks for facilitation of increased balance and tolerance to support safety and I with ADLs. Pt completes ring toss for 5-6 minutes X3. Demos with good posture throughout and no LOB.     Assessment  Assessment  Activity Tolerance: Patient tolerated treatment well  Discharge Recommendations: Home with assist PRN;Continue to assess pending progress    Patient Education  Education  Education Given To: Patient  Education Provided: Role of Therapy;Plan of Care;Safety;ADL Function;Transfer Training;Mobility Training;Energy Conservation;Fall Prevention Strategies  Education Method: Verbal;Demonstration  Barriers to Learning: None  Education Outcome: Verbalized understanding;Demonstrated understanding;Continued education needed    OT Equipment Recommendations  Other: Recommend shower chair, pt reports having walk in with grab bars, and is  getting rails for toilet    Safety Devices  Safety Devices in place: Yes  Type of devices: Left in chair;Chair alarm in place;Call light within reach       Goals  Patient Goals   Patient goals : \"To get better!\"  Short Term Goals  Time Frame for Short Term Goals: By 1 week  Short Term Goal 1: Pt will complete UB bathing/dressing with setup and good safety  Short Term Goal 2: Pt will complete LB bathing/dressing/toileting with Min A, good safety, and use of AE/DME/Modified techniques as needed  Short Term Goal 3: Pt will complete functional transfers/mobility with SBA, good safety, and use of least  management, Safety education & training, Patient/Caregiver education & training, Equipment evaluation, education, & procurement, Positioning, Home management training, Coordination training       08/27/24 0803 08/27/24 1500   OT Individual Minutes   Time In 0803 1437   Time Out 0902 1508   Minutes 59 31         Electronically signed by HALLEY Gifford on 8/27/24 at 3:44 PM EDT

## 2024-08-27 NOTE — PLAN OF CARE
Problem: Discharge Planning  Goal: Discharge to home or other facility with appropriate resources  Outcome: Progressing     Problem: Skin/Tissue Integrity  Goal: Absence of new skin breakdown  Description: 1.  Monitor for areas of redness and/or skin breakdown  Outcome: Progressing     Problem: Safety - Adult  Goal: Free from fall injury  Outcome: Progressing     Problem: ABCDS Injury Assessment  Goal: Absence of physical injury  Outcome: Progressing     Problem: Pain  Goal: Verbalizes/displays adequate comfort level or baseline comfort level  Outcome: Progressing     Problem: Nutrition Deficit:  Goal: Optimize nutritional status  Outcome: Progressing

## 2024-08-27 NOTE — PROGRESS NOTES
Assumed care of patient. Assessment completed. POC discussed. Call light in reach. Weight Bearing;General Precautions;Fall Risk  Lower Extremity Weight Bearing Restrictions  Right Lower Extremity Weight Bearing: Weight Bearing As Tolerated  Position Activity Restriction  Hip Precautions: Anterior hip precautions  Other position/activity restrictions: No straight leg raises     SUBJECTIVE  Subjective: patient reporting feeling slightly off this am and reported low BP       OBJECTIVE                 Functional Mobility  Bed mobility  Bridging: Supervision  Rolling to Left: Supervision  Rolling to Right: Supervision  Supine to Sit: Supervision (with leg )  Sit to Supine: Minimal assistance (used LL to assist RLE into bed and out of bed)  Scooting: Supervision  Bed Mobility Comments: patient attempting self assisting techniques to place RLE into bed with least need for assist. patient demostrated 5 attempts with repeative training. also attempt with low stool at foot of bed to advance floor to stool to bed unsuccessful independent attempt with use of leg .  Transfers  Sit to Stand: Supervision  Stand to Sit: Supervision  Bed to Chair: Supervision  Stand Pivot Transfers: Supervision  Comment: With RW    Environmental Mobility  Ambulation  Surface: Level tile  Device: Rolling Walker  Assistance: Supervision  Quality of Gait: antalgic gait, short step length on left and longer stride on right, forward flexed posture with head and gaze downward, bilat toe out with heels almost touching, no heel/toe gait and unsteady high fall risk  Distance: 80 feet x 2 and 50 feet x 2  Comments: patient with increased fatigue and soreness in right hip  Stairs  # Steps : 4  Stairs Height: 6\"  Rails: Bilateral  Curbs: 6\"  Device: Rolling walker  Assistance: Stand by assistance    PT Exercises  Exercise Treatment: bed mobility training  A/AROM Exercises: R LE AROM x20  use of lef  at time  Resistive Exercises: Seated B LE ex's with #2 on L LE, LGTB on B LE x20  Circulation/Endurance Exercises: ankle pumps,  quad sets, glut sets x15 reps  Functional Mobility Circuit Training: transfer training with RW    ASSESSMENT       Activity Tolerance  Activity Tolerance: Patient tolerated treatment well             GOALS  Patient Goals   Patient Goals : To go home  Short Term Goals  Time Frame for Short Term Goals: 5-7 days  Short Term Goal 1: bed mobility with SBA  Short Term Goal 2: pt able to transfer from various surfaces with CGA  Short Term Goal 3: pt able to ambulate x 75ft with RW andf SBA  Short Term Goal 4: pt able to perform 1 curb sized step with RW and CGA  Short Term Goal 5: Pt to demonstrate increased dynamic sitting balance to GOOD- for the ease of daily activities  Long Term Goals  Time Frame for Long Term Goals : time of discharge  Long Term Goal 1: pt able to perform bed mobility mod-I  Long Term Goal 2: pt able to transfer from various surfaces mod-I  Long Term Goal 3: pt able to ambulate with RW x 150ft mod-I for community distances  Long Term Goal 4: pt able to perform a curb with RW mod-I for community mobility  Long Term Goal 5: Dynamic standing balance to Good for safe ADLs  Additional Goals?: Yes    PLAN OF CARE  Frequency: 1-2 treatment sessions per day, 5-7 days per week  Safety Devices  Type of Devices: Call light within reach;Chair alarm in place;Gait belt;Left in chair    EDUCATION  Education  Education Given To: Patient  Education Provided: Home Exercise Program;Safety;Transfer Training;Mobility Training  Education Provided Comments: bed mobility advancing RLE into bed  Education Method: Demonstration;Verbal  Barriers to Learning: None  Education Outcome: Verbalized understanding;Demonstrated understanding         Therapy Time   08/27/24 1115 08/27/24 1400   PT Individual Minutes   Time In 0935 1308   Time Out 1038 1344   Minutes 63 36            Neisha Rogers PTA, 08/27/24 at 3:04 PM

## 2024-08-27 NOTE — PLAN OF CARE
Problem: Discharge Planning  Goal: Discharge to home or other facility with appropriate resources  8/27/2024 0824 by Lauren Reyes, RN  Outcome: Progressing     Problem: Skin/Tissue Integrity  Goal: Absence of new skin breakdown  Description: 1.  Monitor for areas of redness and/or skin breakdown  2.  Assess vascular access sites hourly  3.  Every 4-6 hours minimum:  Change oxygen saturation probe site  4.  Every 4-6 hours:  If on nasal continuous positive airway pressure, respiratory therapy assess nares and determine need for appliance change or resting period.  8/27/2024 0824 by Lauren Reyes, RN  Outcome: Progressing     Problem: Safety - Adult  Goal: Free from fall injury  8/27/2024 0824 by Lauren Reyes RN  Outcome: Progressing     Problem: ABCDS Injury Assessment  Goal: Absence of physical injury  8/27/2024 0824 by Lauren Reyes, RN  Outcome: Progressing     Problem: Pain  Goal: Verbalizes/displays adequate comfort level or baseline comfort level  8/27/2024 0824 by Lauren Reyes, RN  Outcome: Progressing     Problem: Nutrition Deficit:  Goal: Optimize nutritional status  8/27/2024 0824 by Lauren Reyes, RN  Outcome: Progressing

## 2024-08-27 NOTE — PROGRESS NOTES
normal affect  Head:  normocephalic, atraumatic.  Eye: no icterus, redness, pupils equal and reactive, extraocular eye movements intact, conjunctiva clear  Ear: normal external ear, no discharge, hearing intact  Nose:  no drainage noted  Mouth: mucous membranes moist  Neck: supple, no carotid bruits, thyroid not palpable  Lungs: Bilateral equal air entry, clear to ausculation, no wheezing, rales or rhonchi, normal effort  Cardiovascular: normal rate, regular rhythm, no murmur, gallop, rub.  Abdomen: Soft, nontender, nondistended, normal bowel sounds, no hepatomegaly or splenomegaly  Neurologic: There are no new focal motor or sensory deficits, normal muscle tone and bulk, no abnormal sensation, normal speech, cranial nerves II through XII grossly intact. Normal coordination and DTR  Skin: No concerns for infection at surgical site  Extremities:  peripheral pulses palpable, no pedal edema or calf pain with palpation      Investigations:      Laboratory Testing:  No results found for this or any previous visit (from the past 24 hour(s)).        Imaging/Diagnostics:    Reviewed    Assessment :      Primary Problem  S/P hip replacement, right    Active Hospital Problems    Diagnosis Date Noted    Debility [R53.81] 08/18/2024    S/P hip replacement, right [Z96.641] 08/18/2024    Hypotension [I95.9] 08/30/2011       Plan:     Patient status Admit as inpatient in the rehab    Right hip osteoarthritis s/p total hip replacement on 8/13/2024.  On aspirin 81 mg p.o. twice daily per Ortho service  Recent bradycardia and hypotension, has improved.  Blood pressure is acceptable.  Recent TSH within normal limits  Normocytic normochromic anemia noted on blood work 8/19/2024, likely due to intraoperative losses.  However did not have blood work performed prior to procedure, will recheck CBC, iron studies B12 and folic acid levels on Monday.  Hemoglobin has been stable thus far  History of low normal B12 levels, begin replacement and  recheck levels    8/24  Patient seen and examined  Vitals have been stable pain controlled patient progressing with PT OT  Hemoglobin 8.3,christina supplements, iron studies ordered  Denies any chest pain shortness of breath  proBNP is elevated, improved from last,  Patient comfortable on room air no chest pain shortness of breath    8/27  Patient seen and examined  Her vitals have been stable, hemoglobin is stable started on p.o. iron   Vital stable denies any chest pain shortness of breath  Patient has appointment with Dr. Dickey  tomorrow  Consultations:   IP CONSULT TO DIETITIAN  IP CONSULT TO SOCIAL WORK  IP CONSULT TO INTERNAL MEDICINE  IP CONSULT TO CARDIOLOGY     Patient is admitted as inpatient status because of co-morbidities listed above, severity of signs and symptoms as outlined, requirement for current medical therapies and most importantly because of direct risk to patient if care not provided in a hospital setting.    Stefania Brown MD  8/27/2024  4:25 PM    Copy sent to Dr. Estrella, Paradise Pineda MD    Please note that this chart was generated using voice recognition Dragon dictation software.  Although every effort was made to ensure the accuracy of this automated transcription, some errors in transcription may have occurred.

## 2024-08-27 NOTE — PROGRESS NOTES
Patient in day room with P.T, taking rest break. Patient report feeling much better, ready to go home.  Spiritual Health Assessment/Progress Note  [facility]    (P) Spiritual/Emotional Needs,  ,  ,      Name: Amina Woods MRN: [id number]    Age: 89 y.o.     Sex: female   Language: English   Samaritan: Voodoo   S/P hip replacement, right     Date: 8/27/2024            Total Time Calculated: (P) 15 min              Spiritual Assessment began in [facility]        Referral/Consult From: (P) Nurse, Rounding   Encounter Overview/Reason: (P) Spiritual/Emotional Needs  Service Provided For: (P) Patient    Rosa M, Belief, Meaning:   Patient identifies as spiritual  Family/Friends No family/friends present      Importance and Influence:  Patient has spiritual/personal beliefs that influence decisions regarding their health  Family/Friends no family/friends present    Community:  Patient feels well-supported. Support system includes: Children  Family/Friends are connected with a spiritual community:    Assessment and Plan of Care:     Patient Interventions include: Facilitated expression of thoughts and feelings  Family/Friends Interventions include: Other: N/A    Patient Plan of Care: Spiritual Care available upon further referral  Family/Friends Plan of Care: Other: N/A    Electronically signed by Chaplain Selvin on 8/27/2024 at 2:52 PM

## 2024-08-28 ENCOUNTER — OFFICE VISIT (OUTPATIENT)
Dept: ORTHOPEDIC SURGERY | Age: 89
End: 2024-08-28

## 2024-08-28 DIAGNOSIS — M16.11 PRIMARY OSTEOARTHRITIS OF RIGHT HIP: Primary | ICD-10-CM

## 2024-08-28 PROCEDURE — 97110 THERAPEUTIC EXERCISES: CPT

## 2024-08-28 PROCEDURE — 99024 POSTOP FOLLOW-UP VISIT: CPT | Performed by: ORTHOPAEDIC SURGERY

## 2024-08-28 PROCEDURE — 6370000000 HC RX 637 (ALT 250 FOR IP): Performed by: ORTHOPAEDIC SURGERY

## 2024-08-28 PROCEDURE — 97530 THERAPEUTIC ACTIVITIES: CPT

## 2024-08-28 PROCEDURE — 99232 SBSQ HOSP IP/OBS MODERATE 35: CPT | Performed by: STUDENT IN AN ORGANIZED HEALTH CARE EDUCATION/TRAINING PROGRAM

## 2024-08-28 PROCEDURE — 6370000000 HC RX 637 (ALT 250 FOR IP): Performed by: PHYSICAL MEDICINE & REHABILITATION

## 2024-08-28 PROCEDURE — 6370000000 HC RX 637 (ALT 250 FOR IP): Performed by: INTERNAL MEDICINE

## 2024-08-28 PROCEDURE — 1180000000 HC REHAB R&B

## 2024-08-28 PROCEDURE — 97535 SELF CARE MNGMENT TRAINING: CPT

## 2024-08-28 PROCEDURE — 97116 GAIT TRAINING THERAPY: CPT

## 2024-08-28 RX ADMIN — ASPIRIN 81 MG: 81 TABLET, COATED ORAL at 22:33

## 2024-08-28 RX ADMIN — ASPIRIN 81 MG: 81 TABLET, COATED ORAL at 08:55

## 2024-08-28 RX ADMIN — FERROUS SULFATE TAB 325 MG (65 MG ELEMENTAL FE) 325 MG: 325 (65 FE) TAB at 08:55

## 2024-08-28 RX ADMIN — ACETAMINOPHEN 650 MG: 325 TABLET ORAL at 16:02

## 2024-08-28 RX ADMIN — PANTOPRAZOLE SODIUM 40 MG: 40 TABLET, DELAYED RELEASE ORAL at 06:33

## 2024-08-28 RX ADMIN — ACETAMINOPHEN 650 MG: 325 TABLET ORAL at 22:32

## 2024-08-28 RX ADMIN — FERROUS SULFATE TAB 325 MG (65 MG ELEMENTAL FE) 325 MG: 325 (65 FE) TAB at 16:02

## 2024-08-28 RX ADMIN — Medication 6 MG: at 22:32

## 2024-08-28 RX ADMIN — CYANOCOBALAMIN TAB 1000 MCG 1000 MCG: 1000 TAB at 08:55

## 2024-08-28 RX ADMIN — ACETAMINOPHEN 650 MG: 325 TABLET ORAL at 11:57

## 2024-08-28 RX ADMIN — ACETAMINOPHEN 650 MG: 325 TABLET ORAL at 06:33

## 2024-08-28 NOTE — PROGRESS NOTES
Comprehensive Nutrition Assessment    Type and Reason for Visit:  Reassess    Nutrition Recommendations/Plan:   Continue current diet.      Malnutrition Assessment:  Malnutrition Status:  At risk for malnutrition (Comment) (08/19/24 1611)    Context:  Acute Illness     Findings of the 6 clinical characteristics of malnutrition:  Energy Intake:  Mild decrease in energy intake (Comment)  Weight Loss:  No significant weight loss     Body Fat Loss:  No significant body fat loss     Muscle Mass Loss:  No significant muscle mass loss    Fluid Accumulation:  Mild Extremities   Strength:  Not Performed    Nutrition Assessment:    Pt states her PO intake fluctuates in part depending on food preferences. States she previously was not eating well, but intake has improved and is averaging over 50%.    Nutrition Related Findings:    Edema, meds, labs reviewed. Wound Type: Surgical Incision       Current Nutrition Intake & Therapies:    Average Meal Intake: 51-75%     ADULT DIET; Regular    Anthropometric Measures:  Height: 160 cm (5' 3\")  Ideal Body Weight (IBW): 115 lbs (52 kg)    Admission Body Weight: 73.9 kg (163 lb)  Current Body Weight: 73.9 kg (163 lb), 141.7 % IBW. Weight Source: Not Specified  Current BMI (kg/m2): 28.9  Usual Body Weight: 74.8 kg (165 lb) (8/23)  % Weight Change (Calculated): -1.2                    BMI Categories: Overweight (BMI 25.0-29.9)    Estimated Daily Nutrient Needs:  Energy Requirements Based On: Formula  Weight Used for Energy Requirements: Admission  Energy (kcal/day): Thompson x 1.2= 1400 kcal  Weight Used for Protein Requirements: Admission  Protein (g/day): 1.2-1.3g/kg= 90-95 g  Method Used for Fluid Requirements: 1 ml/kcal  Fluid (ml/day): 1 ml /kcal=1400ml    Nutrition Diagnosis:   Predicted inadequate energy intake related to  (current medical condition) as evidenced by intake 26-50%, intake 51-75%    Nutrition Interventions:   Food and/or Nutrient Delivery: Continue Current

## 2024-08-28 NOTE — PROGRESS NOTES
Date:   8/28/2024  Patient name: Amina Woods  Date of admission:  8/18/2024 11:59 AM  MRN:   [id number]  YOB: 1935  PCP: Paradise Estrella MD    Reason for Admission: Debility [R53.81]    Cardiology follow-up: Status post syncopal episode, transient bradycardia and hypotension       Referring physician: Dr Stefania Brown     Impression     Status post syncopal episode/transient bradycardia/hypotension 8/14/2024 while resting on chair after coming out of the bathroom  Post syncope no neurological deficit no arrhythmias noted  Right hip total arthroplasty 8/13/2024  History of fainting episodes since childhood  No history of exertional angina  No history of coronary intervention  Normal LV systolic function ejection fraction 60% no obvious valvular abnormality  Conduction disorder, right bundle branch block  History of postural hypotension has been taking midodrine 2.5 mg as needed  History of hyperlipidemia  Psoriasis  History of previous syncopal episode/neurocardiogenic syncope  Moderate hiatal hernia  Postop anemia hemoglobin 8.1 on 8/19/2024     Past surgical history bilateral knee replacement, hysterectomy     Investigation workup     ECG 8/15/2024  Sinus rhythm, normal AK interval ,right bundle branch block, no diagnostic change from 7/30/2024     Chest x-ray 8/15/2024  Low lung volumes no acute cardiopulmonary process  Moderate hiatal hernia     ECG 7/30/2024  Normal sinus rhythm heart rate 79, low voltage, right bundle branch block, heart rate 90     2D echo 8/14/2024  Ejection fraction 60 to 65% mild LVH, normal wall motion normal LV size  Normal RV size and function  No significant valvular abnormality  Normal IVC diameter and respiratory variation  No pericardial effusion    Lexiscan  Myoview stress test March 14, 2014   no ischemia no infarct       History of present illness     89-year-old female who lives alone, independent in ADL, her daughter lives next door  She underwent right  exam  HEENT: Head: Normal, normocephalic, atraumatic.  Neck: no JVD and supple, symmetrical, trachea midline  Lungs: diminished breath sounds bibasilar  Heart: regular rate and rhythm  Abdomen:  Soft bowel sounds present  Extremities:  Right thigh edema noted improving  Neurologic: Mental status: Alert, oriented, thought content appropriate    EKG: Sinus rhythm with right bundle branch.  ECHO: reviewed.   Ejection fraction: 60%, mild LVH no valvular abnormality normal IVC dimension  Stress Test: reviewed.  Cardiac Angiography: not obtained.    Assessment / Acute Cardiac Problems:     8/14/2024 at about 10 AM syncopal episode while resting on chair after coming back from bathroom, transient bradycardia, hypotension probable vasovagal  No seizure activity no neurodeficit  Heart rate blood pressure got stabilized within short time  No further episode of syncope or bradycardia since 8/14/2024  No chest pain  No ischemic ECG changes  Right hip replacement 8/13/2024  Previous history of postural hypotension on midodrine 2.5 mg p.o. as needed  No history of coronary intervention  Conduction disorder, right bundle branch block  Patient Active Problem List:     Osteoarthritis     Hyperlipidemia     Mild hyperglycemia.     Hypotension     Psoriasis     Pre-cancerous skin lesions.     Elevated blood pressure reading     S/P total knee replacement     Disorder of lipid metabolism     Anemia     Dizziness     Acid reflux     Osteopenia     Vasovagal syncope     Vitamin D deficiency     Drug-induced constipation     Lumbar radiculopathy     Osteoarthritis of right hip     Primary osteoarthritis of right hip     Debility     S/P hip replacement, right      Plan of Treatment:   Medications reviewed  1: Status post transient bradycardia, hypotension, syncopal episode continue ECG monitor, on midodrine 5 mg as needed  2: History of postural hypotension hold losartan/no blood pressure medication, no beta-blocker  3: Continue current

## 2024-08-28 NOTE — PROGRESS NOTES
[facility]   Acute Rehabilitation Occupational Therapy Daily Treatment Note    Date: 24  Patient Name: Amina Woods       Room: 2609/2609-01  MRN: [id number]  Account: [id number]   : 1935  (89 y.o.) Gender: female       Referring Practitioner: Meghan Mcmahon MD  Diagnosis: S/P hip replacement, right  Additional Pertinent Hx: Per H&P: Amina Woods is a 89 y.o. female with history of psoriasis, HLD admitted to [facility] [facility] on 2024.  She was originally admitted to [facility] [place] on 24.     She initially presented for surgical intervention for right hip osteoarthritis.  She underwent total hip arthroplasty on 24 (Dr. See).  She is WBAT to the right lower limb.  Rapid response/code blue was called on 24 for unresponsiveness but she never lost a pulse.  She was noted to have bradycardia and hypotension at that time.     She is currently requiring assistance for self-care activities and mobility prompting this admission.  She reports ongoing pain in the right lower limb.  She also notes right lower limb edema and constipation.  She denies any chest pain, palpitations, and shortness of breath    Treatment Diagnosis: Impaired self-care status    Past Medical History:  has a past medical history of Basal cell carcinoma, Blood in feces, Hyperlipidemia, Mild hyperglycemia., Orthostatic hypotension, Osteoarthritis, Post-menopausal, Pre-cancerous skin lesions., Prolonged emergence from general anesthesia, Psoriasis, and Syncope, non cardiac.    Past Surgical History:   has a past surgical history that includes Hysterectomy; Colon surgery; Colonoscopy (, ); joint replacement (); Foot surgery; Appendectomy; Skin cancer excision; Skin cancer destruction; sigmoidoscopy (); Cataract removal with implant (Bilateral); and Total hip arthroplasty (Right, 2024).    Restrictions  Restrictions/Precautions  Restrictions/Precautions:  will complete functional transfers/mobility with Mod I, good safety, and use of AE/DME/Modified techniques as needed  Long Term Goal 3: Pt will tolerate dynamic standing 12+ minutes with Mod I during self-care/functional activity for improved balance/activity tolerance  Long Term Goal 4: Pt will complete self-care with improved fine motor coordination AEB 10 second improvement on 9HPT  Long Term Goal 5: Pt will complete self-care with improved  strength AEB 8# improvement using hand dynamometer  Long Term Goal 6: Pt will complete light housekeeping/simple meal prep with Mod I, good safety, and use of least restrictive device for improved participation in ADLs/IADLs  Long Term Goal 7: Pt will verbalize/demonstrate good understanding of home safety/fall prevention strategies for improved safety/independence with self-care    Plan  Occupational Therapy Plan  Times Per Week: 5-7  Times Per Day: Twice a day  Current Treatment Recommendations: Self-Care / ADL, Strengthening, ROM, Balance training, Functional mobility training, Endurance training, Pain management, Safety education & training, Patient/Caregiver education & training, Equipment evaluation, education, & procurement, Positioning, Home management training, Coordination training       08/28/24 0740 08/28/24 1600   OT Individual Minutes   Time In 0803 1440   Time Out 0900 1520   Minutes 57 40       Electronically signed by HALLEY Segura on 8/28/24 at 4:49 PM EDT

## 2024-08-28 NOTE — PLAN OF CARE
Problem: Discharge Planning  Goal: Discharge to home or other facility with appropriate resources  Outcome: Progressing     Problem: Skin/Tissue Integrity  Goal: Absence of new skin breakdown  Description: 1.  Monitor for areas of redness and/or skin breakdown    Problem: ABCDS Injury Assessment  Goal: Absence of physical injury  Outcome: Progressing     Problem: Pain  Goal: Verbalizes/displays adequate comfort level or baseline comfort level  Outcome: Progressing     Problem: Nutrition Deficit:  Goal: Optimize nutritional status  Outcome: Progressing   Outcome: Progressing     Problem: Safety - Adult  Goal: Free from fall injury  Outcome: Progressing

## 2024-08-28 NOTE — PROGRESS NOTES
Supervision  Distance: 30 feet x 2 carpet in lobby space (low pile no padding); 20 feet sidewalk concrete; ramp 81 feet  Comments: required intervention with demonstration of clearing mats placed at doorway entrance of hospital to prevent catching back legs of walker.  Stairs  Comment: patient plans to enter home via ramp. platform steps to lead into house if needed as well  Wheelchair Activities  Propulsion: Yes  Propulsion 1  Propulsion: Manual  Method: RUE;LUE  Level of Assistance: Stand by assistance  Distance: 185 feet    PT Exercises  Exercise Treatment: am focused on sit to supine transfer training with self managing RLE advancement into bed, community gait on/off elevators, hallways including ramp, outside terrain as well to complete gait to Ortho pedic appointment in am. Pm car transfer training with multiple attempts to demonstrate least amount of assist. LL for RLE used. car vs van and modification to seat position and use of garbage bag to slide hips for improved positional leverage.  A/AROM Exercises: R LE AROM x20  use of leg  at time  Resistive Exercises: Seated B LE ex's with #2 on L LE, LGTB on B LE x20  Circulation/Endurance Exercises: ankle pumps, quad sets, glut sets x15 reps    ASSESSMENT       Activity Tolerance  Activity Tolerance: Patient tolerated treatment well       GOALS  Patient Goals   Patient Goals : To go home  Short Term Goals  Time Frame for Short Term Goals: 5-7 days  Short Term Goal 1: bed mobility with SBA  Short Term Goal 2: pt able to transfer from various surfaces with CGA  Short Term Goal 3: pt able to ambulate x 75ft with RW andf SBA  Short Term Goal 4: pt able to perform 1 curb sized step with RW and CGA  Short Term Goal 5: Pt to demonstrate increased dynamic sitting balance to GOOD- for the ease of daily activities  Long Term Goals  Time Frame for Long Term Goals : time of discharge  Long Term Goal 1: pt able to perform bed mobility mod-I  Long Term Goal 2: pt able

## 2024-08-28 NOTE — PROGRESS NOTES
[facility]                   rAisteo See M.D.            [address], [address]               [place], [place] [address]           Dept Phone: [phone]           Dept Fax:  [phone] [address] [facility]                       [address]           [place], [place] [address]          Dept Phone: [phone]           Dept Fax:  [phone]      Chief Compliant:  Chief Complaint   Patient presents with    Post-Op Check     Rt ASI        History of Present Illness:  Patient returns today 2 weeks status post right JORJE-ASI.  She states that all the pain is gone.  She still at the acute rehab but is being discharged in 2 days    Review of Systems   Constitutional: Negative for fever, chills, sweats, recent injury, recent illness  Neurological: Negative for Headaches, numbness, or weakness.    Integumentary: Negative for rash, itching, ecchymosis, or wounds.   Musculoskeletal: Positive for Post-Op Check (Rt ASI)       Physical Exam:  Constitutional: Patient is oriented to person, place, and time. Patient appears well-developed and well nourished.   Musculoskeletal: Normal gait. Expected degree of meralgia parasthetica. Expected mild pain with gentle ROM of hip. Calves negative. Shonda's negative. Neurovascular Intact. Leg lengths equal  Neurological: Patient is alert and oriented to person, place, and time. Normal strenght. No sensory deficit.  Skin: Skin is warm and dry. ASI incision without redness or drainage.   Nursing note and vitals reviewed.     Labs and Imaging:     XR taken today:  XR PELVIS (1-2 VIEWS)    Result Date: 8/28/2024  X-rays taken the reviewed by me show AP of the pelvis and lateral right hip shows the right total of arthroplasty in excellent alignment position on both views without any periprosthetic complications           Orders Placed This Encounter   Procedures    XR PELVIS (1-2 VIEWS)     Standing Status:   Future     Number of Occurrences:

## 2024-08-28 NOTE — PLAN OF CARE
Problem: Discharge Planning  Goal: Discharge to home or other facility with appropriate resources  8/28/2024 1341 by Kenzie Nickerson LPN  Outcome: Progressing     Problem: Skin/Tissue Integrity  Goal: Absence of new skin breakdown  Description: 1.  Monitor for areas of redness and/or skin breakdown  2.  Assess vascular access sites hourly  3.  Every 4-6 hours minimum:  Change oxygen saturation probe site  4.  Every 4-6 hours:  If on nasal continuous positive airway pressure, respiratory therapy assess nares and determine need for appliance change or resting period.  8/28/2024 1341 by Kenzie Nickerson LPN  Outcome: Progressing     Problem: Safety - Adult  Goal: Free from fall injury  8/28/2024 1341 by Kenzie Nickerson LPN  Outcome: Progressing     Problem: ABCDS Injury Assessment  Goal: Absence of physical injury  8/28/2024 1341 by Kenzie Nickerson LPN  Outcome: Progressing     Problem: Pain  Goal: Verbalizes/displays adequate comfort level or baseline comfort level  8/28/2024 1341 by Kenzie Nickerson LPN  Outcome: Progressing     Problem: Nutrition Deficit:  Goal: Optimize nutritional status  8/28/2024 1341 by Kenzie Nickerson LPN  Outcome: Progressing

## 2024-08-28 NOTE — PROGRESS NOTES
Physical Medicine & Rehabilitation  Progress Note      Subjective:      Amina Woods is a 89 y.o. female with right hip osteoarthritis s/p total hip arthroplasty.     She reports doing well today.  She states that her appointment with orthopedic surgery went well.  She is looking forward to discharge on Friday.  She denies any pain in the right hip and any other acute concerns.    ROS:  Denies fevers, chills, sweats.  No chest pain, palpitations, lightheadedness.  Denies coughing, wheezing or shortness of breath.  Denies abdominal pain, nausea, diarrhea or constipation.  No new areas of joint pain.  Denies new areas of numbness or weakness.  Denies new anxiety or depression issues.  No new skin problems.    Rehabilitation:     Physical Therapy    Restrictions/Precautions: Weight Bearing, General Precautions, Fall Risk  Implants present? : Metal implants  Hip Precautions: Anterior hip precautions  Other position/activity restrictions: No straight leg raises  Right Lower Extremity Weight Bearing: Weight Bearing As Tolerated    Bed mobility  Bridging: Supervision  Rolling to Left: Supervision  Rolling to Right: Supervision  Supine to Sit: Supervision  Sit to Supine: Supervision (floor to seat height 24 1/2\")  Scooting: Supervision  Bed Mobility Comments: patient attempting self assisting techniques to place RLE into bed with least need for assist. patient demostrated 3 attempts with repeative training. notified DTR Karolyn via text message during session regarding bed height demonstrated unassisted at 24 1/2\" surface height.    Transfers  Sit to Stand: Supervision  Stand to Sit: Supervision  Bed to Chair: Supervision  Stand Pivot Transfers: Supervision  Car Transfer: Minimal Assistance (RLE- leg  and modified seat reclined)  Comment: With RW; car transfer training with multiple attempts to demonstrate least amount of assist. LL for RLE used. car vs van and modification to seat position and use of garbage bag

## 2024-08-29 PROCEDURE — 97530 THERAPEUTIC ACTIVITIES: CPT

## 2024-08-29 PROCEDURE — 6370000000 HC RX 637 (ALT 250 FOR IP): Performed by: ORTHOPAEDIC SURGERY

## 2024-08-29 PROCEDURE — 6370000000 HC RX 637 (ALT 250 FOR IP): Performed by: INTERNAL MEDICINE

## 2024-08-29 PROCEDURE — 97535 SELF CARE MNGMENT TRAINING: CPT

## 2024-08-29 PROCEDURE — 97110 THERAPEUTIC EXERCISES: CPT

## 2024-08-29 PROCEDURE — 6370000000 HC RX 637 (ALT 250 FOR IP): Performed by: PHYSICAL MEDICINE & REHABILITATION

## 2024-08-29 PROCEDURE — 97116 GAIT TRAINING THERAPY: CPT

## 2024-08-29 PROCEDURE — 99232 SBSQ HOSP IP/OBS MODERATE 35: CPT | Performed by: INTERNAL MEDICINE

## 2024-08-29 PROCEDURE — 99232 SBSQ HOSP IP/OBS MODERATE 35: CPT | Performed by: STUDENT IN AN ORGANIZED HEALTH CARE EDUCATION/TRAINING PROGRAM

## 2024-08-29 PROCEDURE — 1180000000 HC REHAB R&B

## 2024-08-29 RX ADMIN — ASPIRIN 81 MG: 81 TABLET, COATED ORAL at 21:26

## 2024-08-29 RX ADMIN — Medication 6 MG: at 21:26

## 2024-08-29 RX ADMIN — PANTOPRAZOLE SODIUM 40 MG: 40 TABLET, DELAYED RELEASE ORAL at 06:31

## 2024-08-29 RX ADMIN — ACETAMINOPHEN 650 MG: 325 TABLET ORAL at 17:17

## 2024-08-29 RX ADMIN — ACETAMINOPHEN 650 MG: 325 TABLET ORAL at 21:26

## 2024-08-29 RX ADMIN — ACETAMINOPHEN 650 MG: 325 TABLET ORAL at 06:31

## 2024-08-29 RX ADMIN — CYANOCOBALAMIN TAB 1000 MCG 1000 MCG: 1000 TAB at 08:45

## 2024-08-29 RX ADMIN — FERROUS SULFATE TAB 325 MG (65 MG ELEMENTAL FE) 325 MG: 325 (65 FE) TAB at 08:45

## 2024-08-29 RX ADMIN — ASPIRIN 81 MG: 81 TABLET, COATED ORAL at 08:45

## 2024-08-29 RX ADMIN — FERROUS SULFATE TAB 325 MG (65 MG ELEMENTAL FE) 325 MG: 325 (65 FE) TAB at 17:17

## 2024-08-29 RX ADMIN — ACETAMINOPHEN 650 MG: 325 TABLET ORAL at 12:15

## 2024-08-29 NOTE — PLAN OF CARE
Problem: Discharge Planning  Goal: Discharge to home or other facility with appropriate resources  8/29/2024 0341 by Michelle Russell RN  Outcome: Progressing     Problem: Skin/Tissue Integrity  Goal: Absence of new skin breakdown  Description: 1.  Monitor for areas of redness and/or skin breakdown    Problem: Pain  Goal: Verbalizes/displays adequate comfort level or baseline comfort level  8/29/2024 0341 by Michelle Russell RN  Outcome: Progressing     Problem: Nutrition Deficit:  Goal: Optimize nutritional status  8/29/2024 0341 by Michelle Russell, RN  Outcome: Progressing   8/29/2024 0341 by Michelle Russell RN  Outcome: Progressing     Problem: Safety - Adult  Goal: Free from fall injury  8/29/2024 0341 by Michelle Russell RN  Outcome: Progressing     Problem: ABCDS Injury Assessment  Goal: Absence of physical injury  8/29/2024 0341 by Michelle Russell, RN  Outcome: Progressing

## 2024-08-29 NOTE — PLAN OF CARE
Problem: Discharge Planning  Goal: Discharge to home or other facility with appropriate resources  8/29/2024 1200 by Kenzie Nickerson LPN  Outcome: Progressing     Problem: Skin/Tissue Integrity  Goal: Absence of new skin breakdown  Description: 1.  Monitor for areas of redness and/or skin breakdown  2.  Assess vascular access sites hourly  3.  Every 4-6 hours minimum:  Change oxygen saturation probe site  4.  Every 4-6 hours:  If on nasal continuous positive airway pressure, respiratory therapy assess nares and determine need for appliance change or resting period.  8/29/2024 1200 by Kenzie Nickerson LPN  Outcome: Progressing     Problem: Safety - Adult  Goal: Free from fall injury  8/29/2024 1200 by Kenzie Nickerson LPN  Outcome: Progressing     Problem: ABCDS Injury Assessment  Goal: Absence of physical injury  8/29/2024 1200 by Kenzie Nickerson LPN  Outcome: Progressing     Problem: Pain  Goal: Verbalizes/displays adequate comfort level or baseline comfort level  8/29/2024 1200 by Kenzie Nickerson LPN  Outcome: Progressing     Problem: Nutrition Deficit:  Goal: Optimize nutritional status  8/29/2024 1200 by Kenzie Nickerson LPN  Outcome: Progressing

## 2024-08-29 NOTE — PROGRESS NOTES
Physical Medicine & Rehabilitation  Progress Note      Subjective:      Amina Woods is a 89 y.o. female with right hip osteoarthritis s/p total hip arthroplasty.     She reports doing well today.  She states that she worked hard in therapy.  She is looking forward to discharge tomorrow.  She denies any pain and any other acute concerns.    ROS:  Denies fevers, chills, sweats.  No chest pain, palpitations, lightheadedness.  Denies coughing, wheezing or shortness of breath.  Denies abdominal pain, nausea, diarrhea or constipation.  No new areas of joint pain.  Denies new areas of numbness or weakness.  Denies new anxiety or depression issues.  No new skin problems.    Rehabilitation:     Physical Therapy    Restrictions/Precautions: Weight Bearing, General Precautions, Fall Risk  Implants present? : Metal implants  Hip Precautions: Anterior hip precautions  Other position/activity restrictions: No straight leg raises  Right Lower Extremity Weight Bearing: Weight Bearing As Tolerated    Bed mobility  Bridging: Independent  Rolling to Left: Independent  Rolling to Right: Independent  Supine to Sit: Independent  Sit to Supine: Independent (floor to seat height 24 1/2\")  Scooting: Independent  Bed Mobility Comments: Pt demonstrated ability to get in and out of a flat bed. Pt has a flat queen size bed at home.    Transfers  Sit to Stand: Supervision  Stand to Sit: Supervision  Bed to Chair: Supervision  Stand Pivot Transfers: Supervision  Car Transfer: Contact guard assistance (CGA with RLE, placed a plastic bag on seat allowing pt to turn without struggle.)  Comment: Pt demonstrated ability to get into and out of her own car that her niece drove here. Pt reports \"that was much easier\". CGA to Right LE 3 inches into the car.    WB Status: WBAT RLE no SLR  Ambulation  Surface: Level tile  Device: Rolling Walker  Other Apparatus:  (writer pulling w/c)  Assistance: Modified Independent  Quality of Gait: good step  PRN  vitamin B-12 (CYANOCOBALAMIN) tablet 1,000 mcg, 1,000 mcg, Oral, Daily  senna (SENOKOT) tablet 17.2 mg, 2 tablet, Oral, Daily PRN  bisacodyl (DULCOLAX) suppository 10 mg, 10 mg, Rectal, Daily PRN  acetaminophen (TYLENOL) tablet 650 mg, 650 mg, Oral, Q6H  aspirin EC tablet 81 mg, 81 mg, Oral, BID  midodrine (PROAMATINE) tablet 5 mg, 5 mg, Oral, Q8H PRN  pantoprazole (PROTONIX) tablet 40 mg, 40 mg, Oral, QAM AC  traMADol (ULTRAM) tablet 25 mg, 25 mg, Oral, Q6H PRN **OR** traMADol (ULTRAM) tablet 50 mg, 50 mg, Oral, Q6H PRN  docusate sodium (COLACE) capsule 100 mg, 100 mg, Oral, BID PRN    Objective:  BP (!) 130/50   Pulse 89   Temp 98.1 °F (36.7 °C) (Oral)   Resp 18   Ht 1.6 m (5' 3\")   Wt 73.9 kg (163 lb)   SpO2 94%   BMI 28.87 kg/m²       GEN: Well developed, well nourished, no acute distress  HEENT: Normocephalic, atraumatic.  EOM grossly intact.  Hearing grossly intact.  Mucous membranes pink and moist.  RESP: Normal breath sounds with no wheezing, rales, or rhonchi. Respirations WNL and unlabored.  CV: Regular rate and rhythm. No murmurs, rubs, or gallops.  ABD: Soft, non-distended, bowel sounds present and equal.  NEURO: Alert.  Speech fluent.  Sensation to light touch intact in the bilateral lower limbs.  MSK: Muscle bulk is normal bilaterally. Moving bilateral upper limbs with at least antigravity strength.  Strength 4+/5 with bilateral ankle dorsiflexion.  Able to extend the bilateral knees against gravity  LIMBS: Edema present in right lower limb - improving.  SKIN: Warm and dry with good turgor.  Right hip incision healing well.  PSYCH: Mood WNL. Affect WNL. Appropriately interactive.    Diagnostics:     CBC:   No results for input(s): \"WBC\", \"RBC\", \"HGB\", \"HCT\", \"MCV\", \"RDW\", \"PLT\" in the last 72 hours.    BMP:   No results for input(s): \"NA\", \"K\", \"CL\", \"CO2\", \"PHOS\", \"BUN\", \"CREATININE\", \"GLUCOSE\" in the last 72 hours.    Invalid input(s): \"CA\"    BNP: No results for input(s): \"BNP\" in the

## 2024-08-29 NOTE — PROGRESS NOTES
Weight Bearing, General Precautions, Fall Risk  Required Braces or Orthoses?: Yes  Implants present? : Metal implants     Position Activity Restriction  Hip Precautions: Anterior hip precautions  Other position/activity restrictions: No straight leg raises  Lower Extremity Weight Bearing Restrictions  Right Lower Extremity Weight Bearing: Weight Bearing As Tolerated     Vitals      Subjective  Subjective  Subjective: AM: Pt in early mobility chair upon arrival. Eager to get the day started. \"I was ready 2 hours ago.\"-smiling. Excited about discharging home tomorrow. PM: Pt had just completed physical therapy session upon arrival. Pleasant as usual. Agreeable to participate.  Pain: AM: No c/o pain during session. PM: Reports some soreness.     Objective  Cognition  Overall Orientation Status: Within Functional Limits  Orientation Level: Oriented X4    Cognition  Overall Cognitive Status: WFL    Activities of Daily Living  Feeding  Assistance Level: Independent  Skilled Clinical Factors: Per pt report.    Grooming/Oral Hygiene  Assistance Level: Modified independent  Skilled Clinical Factors: Standing at sink with rw for oral hygiene, face washing, application of face lotion, and hair brushing.    Upper Extremity Bathing  Assistance Level: Modified independent  Skilled Clinical Factors: Completed while standing at sink with rw.    Lower Extremity Bathing  Assistance Level: Modified independent  Skilled Clinical Factors: Completed while seated and while standing. G safety.    Upper Extremity Dressing  Assistance Level: Modified independent  Skilled Clinical Factors: Able to complete doffing and donning of overhead clothing with ease.    Lower Extremity Dressing  Assistance Level: Modified independent  Skilled Clinical Factors: Completed threading without AE this date, able to stand at rw and complete clothing management up over hips with increased time. No LOB.    Putting On/Taking Off Footwear  Assistance Level:  Training;Mobility Training;Energy Conservation;Fall Prevention Strategies  Education Provided Comments: practiced using adaptive equipment for reaching feet for LE self care- reachers to don pants and reachers, shoe horn to don shoes.  Education Method: Verbal;Demonstration  Barriers to Learning: None  Education Outcome: Verbalized understanding;Demonstrated understanding;Continued education needed    Safety Devices  Safety Devices in place: Yes  Type of devices: All fall risk precautions in place     Goals  Patient Goals   Patient goals : \"To get better!\"  Short Term Goals  Time Frame for Short Term Goals: By 1 week  Short Term Goal 1: Pt will complete UB bathing/dressing with setup and good safety  Short Term Goal 2: Pt will complete LB bathing/dressing/toileting with Min A, good safety, and use of AE/DME/Modified techniques as needed  Short Term Goal 3: Pt will complete functional transfers/mobility with SBA, good safety, and use of least restrictive device  Short Term Goal 4: Pt will actively participate in 30+ minutes of therapeutic exercise/functional activity/social participation for increased safety/independence with self-care and improved quality of life  Short Term Goal 5: Pt will tolerate static/dynamic standing for 6+ minutes with SBA during self-care/functional activity for improved balance/activity tolerance  Short Term Goal 6: Pt will be educated on and explore use of AE/DME/Modified techniques as needed for improved safety and independence with self-care tasks  Short Term Goal 7: Pt will verbalize/demonstrate good understanding of EC/WS strategies for improved safety/independence with self-care    Long Term Goals  Time Frame for Long Term Goals : By discharge  Long Term Goal 1: Pt will complete BADLs with Mod I, good safety, and use of AE/DME/Modified techniques as needd  Long Term Goal 2: Pt will complete functional transfers/mobility with Mod I, good safety, and use of AE/DME/Modified techniques as

## 2024-08-29 NOTE — PROGRESS NOTES
Management as per primary    Physical Therapy  Facility/Department: [facility]  Rehabilitation Physical Therapy    NAME: Amina Woods  : 1935 (89 y.o.)  MRN: [id number]  CODE STATUS: Full Code    Date of Service: 24      Past Medical History:   Diagnosis Date    Basal cell carcinoma     mulitple    Blood in feces 2016    Hyperlipidemia 2011    Mild hyperglycemia. 2011    Orthostatic hypotension 2011    Osteoarthritis     knees, thumbs    Post-menopausal     Pre-cancerous skin lesions. 2011    Prolonged emergence from general anesthesia     Psoriasis 2011    Syncope, non cardiac     \"vascular syncope\" per patient - last episode was      Past Surgical History:   Procedure Laterality Date    APPENDECTOMY      during [facility]    CATARACT REMOVAL WITH IMPLANT Bilateral     COLON SURGERY      fistula repair- pt not sure    COLONOSCOPY  ,     FOOT SURGERY      [place]    HYSTERECTOMY (CERVIX STATUS UNKNOWN)      complete, d/t fibroids    JOINT REPLACEMENT      Right and left knees    SIGMOIDOSCOPY      SKIN CANCER DESTRUCTION      basal cell    SKIN CANCER EXCISION      basal cell    TOTAL HIP ARTHROPLASTY Right 2024    HIP TOTAL ARTHROPLASTY ASI performed by Aristeo See MD at [facility] OR       Chart Reviewed: Yes  Additional Pertinent Hx: Per Ortho note Patient is a 89 y.o. 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 The patient has failed all types of conservative treatments including physical therapy, NSAIDs, weight loss counseling, and intra-articular steroid injection. The patient's activities of daily living have become significantly restricted. Having failed conservative treatment, the patient has agreed to proceed  with total hip arthroplasty aware of all risks highlighted in the surgical consent form.  Family / Caregiver Present: No (Niece was present for Car Transfer training.)  Referring Practitioner: Aristeo See MD  Diagnosis: DJD of the  ROM;Decreased ADL status;Decreased functional mobility ;Decreased strength;Decreased endurance;Decreased balance;Decreased posture  Activity Tolerance Comment: Tolerated Tx Well, no complaints  Treatment Diagnosis: Impaired functional mobility and strength secondary to R TKA.  Therapy Prognosis: Good  Decision Making: Medium Complexity  History: R JORJE  Exam: decreased balance, endurance, ROM, strength, mobility  Clinical Presentation: evolving  Barriers to Learning: none  Discharge Recommendations: Home with assist PRN  PT Equipment Recommendations  Equipment Needed:  (TBD)    GOALS  Patient Goals   Patient Goals : To go home  Short Term Goals  Time Frame for Short Term Goals: 5-7 days  Short Term Goal 1: bed mobility with SBA  Short Term Goal 2: pt able to transfer from various surfaces with CGA  Short Term Goal 3: pt able to ambulate x 75ft with RW andf SBA  Short Term Goal 4: pt able to perform 1 curb sized step with RW and CGA  Short Term Goal 5: Pt to demonstrate increased dynamic sitting balance to GOOD- for the ease of daily activities  Long Term Goals  Time Frame for Long Term Goals : time of discharge  Long Term Goal 1: pt able to perform bed mobility mod-I  Long Term Goal 2: pt able to transfer from various surfaces mod-I  Long Term Goal 3: pt able to ambulate with RW x 150ft mod-I for community distances  Long Term Goal 4: pt able to perform a curb with RW mod-I for community mobility  Long Term Goal 5: Dynamic standing balance to Good for safe ADLs  Additional Goals?: Yes    PLAN OF CARE  Frequency: 1-2 treatment sessions per day, 5-7 days per week  Physical Therapy Plan  General Plan:  minutes of therapy at least 5 out of 7 days a week  Specific Instructions for Next Treatment: Continue with functional mobilty training (bed mobility, transfers and gait/RW)  Current Treatment Recommendations: Strengthening;ROM;Gait training;Balance training;Stair training;Functional mobility training;Transfer

## 2024-08-29 NOTE — PROGRESS NOTES
[facility]   IN-PATIENT SERVICE   [facility]    HISTORY AND PHYSICAL EXAMINATION            Date:   8/29/2024  Patient name:  Amina Woods  Date of admission:  8/18/2024 11:59 AM  MRN:   [id number]  Account:  [id number]  YOB: 1935  PCP:    Paradise Estrella MD  Room:   [address] [place] [address]  Code Status:    Full Code    Chief Complaint:     Medical management    History Obtained From:     patient    History of Present Illness:     The patient is a 89 y.o.  Non- / non  female who presents with No chief complaint on file.   and she is admitted to the hospital for the management of medical issues.    89-year-old female with history of orthostatic hypotension, psoriasis, vascular syncope, initially presented for surgical intervention of the right hip osteoarthritis, underwent total hip arthroplasty on 8/13/2024.  Rapid response was called on 8/14/2024 as she was unresponsive, never lost a pulse.  She was noted to be bradycardic and hypotensive at that time.  When seen today the patient denies any acute concerns, hip pain is improving.    Past Medical History:     Past Medical History:   Diagnosis Date    Basal cell carcinoma     mulitple    Blood in feces 09/19/2016    Hyperlipidemia 08/30/2011    Mild hyperglycemia. 08/30/2011    Orthostatic hypotension 08/30/2011    Osteoarthritis     knees, thumbs    Post-menopausal     Pre-cancerous skin lesions. 08/30/2011    Prolonged emergence from general anesthesia     Psoriasis 08/30/2011    Syncope, non cardiac     \"vascular syncope\" per patient - last episode was 2023        Past Surgical History:     Past Surgical History:   Procedure Laterality Date    APPENDECTOMY      during hyst    CATARACT REMOVAL WITH IMPLANT Bilateral     COLON SURGERY      fistula repair- pt not sure    COLONOSCOPY  1997, 2002    FOOT SURGERY      hammartoe    HYSTERECTOMY (CERVIX STATUS UNKNOWN)      complete, d/t fibroids    JOINT REPLACEMENT     Problem Relation Age of Onset    Breast Cancer Mother     Heart Disease Father         angina    Cancer Sister         melanoma    Diabetes Sister     Diabetes Sister     Cancer Brother         kidney    Cancer Brother         colon    Diabetes Maternal Grandmother     Prostate Cancer Maternal Grandfather     Cancer Maternal Aunt         melanoma    Cancer Other         ovarian       Review of Systems:     Positive and Negative as described in HPI.    CONSTITUTIONAL:  negative for fevers, chills, sweats, fatigue, weight loss  HEENT:  negative for vision, hearing changes, runny nose, throat pain  RESPIRATORY:  negative for shortness of breath, cough, congestion, wheezing.  CARDIOVASCULAR:  negative for chest pain, palpitations.  GASTROINTESTINAL:  negative for nausea, vomiting, diarrhea, constipation, change in bowel habits, abdominal pain   GENITOURINARY:  negative for difficulty of urination, burning with urination, frequency   INTEGUMENT:  negative for rash, skin lesions, easy bruising   HEMATOLOGIC/LYMPHATIC:  negative for swelling/edema   ALLERGIC/IMMUNOLOGIC:  negative for urticaria , itching  ENDOCRINE:  negative increase in drinking, increase in urination, hot or cold intolerance  MUSCULOSKELETAL: Hip pain improving  NEUROLOGICAL:  negative for headaches, dizziness, lightheadedness, numbness, pain, tingling extremities      Physical Exam:   BP (!) 144/57   Pulse 78   Temp 98.1 °F (36.7 °C)   Resp 18   Ht 1.6 m (5' 3\")   Wt 73.9 kg (163 lb)   SpO2 94%   BMI 28.87 kg/m²   Temp (24hrs), Av.1 °F (36.7 °C), Min:98.1 °F (36.7 °C), Max:98.1 °F (36.7 °C)    No results for input(s): \"POCGLU\" in the last 72 hours.  No intake or output data in the 24 hours ending 24 [id number]      General Appearance:  alert, well appearing, and in no acute distress  Mental status: oriented to person, place, and time with normal affect  Head:  normocephalic, atraumatic.  Eye: no icterus, redness, pupils equal and reactive,

## 2024-08-29 NOTE — PROGRESS NOTES
Date:   8/29/2024  Patient name: Amina Woods  Date of admission:  8/18/2024 11:59 AM  MRN:   [id number]  YOB: 1935  PCP: Paradise Estrella MD    Reason for Admission: Debility [R53.81]    Cardiology follow-up: Status post syncopal episode, transient bradycardia and hypotension        Referring physician: Dr Stefania Brown     Impression     Status post syncopal episode/transient bradycardia/hypotension 8/14/2024 while resting on chair after coming out of the bathroom  Post syncope no neurological deficit no arrhythmias noted  Right hip total arthroplasty 8/13/2024  History of fainting episodes since childhood  No history of exertional angina  No history of coronary intervention  Normal LV systolic function ejection fraction 60% no obvious valvular abnormality  Conduction disorder, right bundle branch block  History of postural hypotension has been taking midodrine 2.5 mg as needed  History of hyperlipidemia  Psoriasis  History of previous syncopal episode/neurocardiogenic syncope  Moderate hiatal hernia  Postop anemia hemoglobin 8.1 on 8/19/2024     Past surgical history bilateral knee replacement, hysterectomy      Investigation workup     ECG 8/15/2024  Sinus rhythm, normal TN interval ,right bundle branch block, no diagnostic change from 7/30/2024     Chest x-ray 8/15/2024  Low lung volumes no acute cardiopulmonary process  Moderate hiatal hernia     ECG 7/30/2024  Normal sinus rhythm heart rate 79, low voltage, right bundle branch block, heart rate 90     2D echo 8/14/2024  Ejection fraction 60 to 65% mild LVH, normal wall motion normal LV size  Normal RV size and function  No significant valvular abnormality  Normal IVC diameter and respiratory variation  No pericardial effusion     Lexiscan  Myoview stress test March 14, 2014   no ischemia no infarct    History of present illness     89-year-old female who lives alone, independent in ADL, her daughter lives next door  She underwent right

## 2024-08-30 VITALS
BODY MASS INDEX: 28.88 KG/M2 | DIASTOLIC BLOOD PRESSURE: 53 MMHG | SYSTOLIC BLOOD PRESSURE: 126 MMHG | RESPIRATION RATE: 18 BRPM | TEMPERATURE: 97.8 F | HEART RATE: 78 BPM | WEIGHT: 163 LBS | OXYGEN SATURATION: 95 % | HEIGHT: 63 IN

## 2024-08-30 PROCEDURE — 6370000000 HC RX 637 (ALT 250 FOR IP): Performed by: ORTHOPAEDIC SURGERY

## 2024-08-30 PROCEDURE — 97535 SELF CARE MNGMENT TRAINING: CPT

## 2024-08-30 PROCEDURE — 99238 HOSP IP/OBS DSCHRG MGMT 30/<: CPT | Performed by: PHYSICAL MEDICINE & REHABILITATION

## 2024-08-30 PROCEDURE — 97530 THERAPEUTIC ACTIVITIES: CPT

## 2024-08-30 PROCEDURE — 6370000000 HC RX 637 (ALT 250 FOR IP): Performed by: INTERNAL MEDICINE

## 2024-08-30 PROCEDURE — 97116 GAIT TRAINING THERAPY: CPT

## 2024-08-30 RX ORDER — FERROUS SULFATE 325(65) MG
325 TABLET ORAL 2 TIMES DAILY WITH MEALS
Qty: 60 TABLET | Refills: 1 | Status: SHIPPED | OUTPATIENT
Start: 2024-08-30

## 2024-08-30 RX ORDER — LANOLIN ALCOHOL/MO/W.PET/CERES
6 CREAM (GRAM) TOPICAL NIGHTLY PRN
COMMUNITY
Start: 2024-08-30

## 2024-08-30 RX ORDER — MIDODRINE HYDROCHLORIDE 5 MG/1
5 TABLET ORAL EVERY 8 HOURS PRN
Qty: 90 TABLET | Refills: 0 | Status: SHIPPED | OUTPATIENT
Start: 2024-08-30

## 2024-08-30 RX ORDER — ASPIRIN 81 MG/1
TABLET ORAL
Qty: 30 TABLET | Refills: 1 | Status: SHIPPED | OUTPATIENT
Start: 2024-08-30

## 2024-08-30 RX ADMIN — ACETAMINOPHEN 650 MG: 325 TABLET ORAL at 11:12

## 2024-08-30 RX ADMIN — PANTOPRAZOLE SODIUM 40 MG: 40 TABLET, DELAYED RELEASE ORAL at 06:22

## 2024-08-30 RX ADMIN — ACETAMINOPHEN 650 MG: 325 TABLET ORAL at 06:22

## 2024-08-30 RX ADMIN — FERROUS SULFATE TAB 325 MG (65 MG ELEMENTAL FE) 325 MG: 325 (65 FE) TAB at 07:56

## 2024-08-30 RX ADMIN — CYANOCOBALAMIN TAB 1000 MCG 1000 MCG: 1000 TAB at 07:56

## 2024-08-30 RX ADMIN — ASPIRIN 81 MG: 81 TABLET, COATED ORAL at 07:56

## 2024-08-30 NOTE — DISCHARGE SUMMARY
Physical Medicine & Rehabilitation  Discharge Summary     Patient Identification:  Amina Woods  : 1935  Admit date: 2024  Discharge date: 24  Attending provider: Meghan Mcmahon MD        Primary care provider: Paradise Estrella MD     Discharge Diagnoses:   Right hip osteoarthritis s/p total hip arthroplasty  Recent episode of bradycardia and hypotension  Anemia    Discharge Functional Status:      Physical Therapy    Restrictions/Precautions: Weight Bearing, General Precautions, Fall Risk  Implants present? : Metal implants  Hip Precautions: Anterior hip precautions  Other position/activity restrictions: No straight leg raises  Right Lower Extremity Weight Bearing: Weight Bearing As Tolerated    Bed mobility  Bridging: Independent  Rolling to Left: Independent  Rolling to Right: Independent  Supine to Sit: Independent  Sit to Supine: Independent (floor to seat height 24 1/2\")  Scooting: Independent  Bed Mobility Comments: Pt demonstrated ability to get in and out of a flat bed. Patient has an adjustable bed at home if needed HOB raised.    Transfers  Sit to Stand: Supervision  Stand to Sit: Supervision  Bed to Chair: Supervision  Stand Pivot Transfers: Supervision  Car Transfer: Contact guard assistance (CGA with RLE, placed a plastic bag on seat allowing pt to turn without struggle.)  Comment: Pt demonstrated ability to get into and out of her own car that her niece drove here. Pt reports \"that was much easier\". CGA to Right LE 3 inches into the car.    WB Status: WBAT RLE no SLR  Ambulation  Surface: Level tile  Device: Rolling Walker  Other Apparatus:  (writer pulling w/c)  Assistance: Modified Independent  Quality of Gait: good step length, good heel/toe gait, steady, head and gaze is downward but does look up a little at times.  Gait Deviations: Deviated path  Distance: 200ft  Comments: ambulate down the hallway and back  More Ambulation?: Yes      Occupational  this for a total of 4 weeks of DVT prophylaxis post-operatively.      Patient evaluated today:    GEN: well developed, well nourished, NAD  HEENT: NCAT, PERRL, EOMI, mucous membranes pink and moist  CV: RRR, no murmurs, rubs or gallops  PULM: CTAB, no rales or rhonchi. Respirations WNL and unlabored  ABD: soft, NT, ND, BS+ and equal  NEURO: A&O x3. Sensation intact to light touch.   MSK: Functional ROM BUEs and LLE. Weakness and pain impair AROM R hip .  Strength 5/5 key muscles BUEs and LLE. R hip flexion 4/5, R knee extension 4+/5.  EXTREMITIES: No calf tenderness to palpation bilaterally. Post op edema proximal RLE - improved  SKIN: warm dry and intact with good turgor except R lateral hip incision.   PSYCH: appropriately interactive. Affect WNL.       Consults:   cardiology and internal medicine      Significant Diagnostics:   CBC with Differential:    Lab Results   Component Value Date/Time    WBC 8.1 08/26/2024 06:46 AM    RBC 2.92 08/26/2024 06:46 AM    HGB 8.7 08/26/2024 06:46 AM    HCT 26.2 08/26/2024 06:46 AM     08/26/2024 06:46 AM    MCV 89.6 08/26/2024 06:46 AM    MCH 29.8 08/26/2024 06:46 AM    MCHC 33.3 08/26/2024 06:46 AM    RDW 13.9 08/26/2024 06:46 AM    LYMPHOPCT 15 08/26/2024 06:46 AM    MONOPCT 7 08/26/2024 06:46 AM    EOSPCT 4 08/26/2024 06:46 AM    BASOPCT 0 08/26/2024 06:46 AM    MONOSABS 0.60 08/26/2024 06:46 AM    LYMPHSABS 1.20 08/26/2024 06:46 AM    EOSABS 0.40 08/26/2024 06:46 AM    BASOSABS 0.00 08/26/2024 06:46 AM     CMP:    Lab Results   Component Value Date/Time     08/26/2024 06:46 AM    K 5.1 08/26/2024 06:46 AM     08/26/2024 06:46 AM    CO2 29 08/26/2024 06:46 AM    BUN 15 08/26/2024 06:46 AM    CREATININE 0.8 08/26/2024 06:46 AM    LABGLOM 70 08/26/2024 06:46 AM    GLUCOSE 102 08/26/2024 06:46 AM    CALCIUM 8.9 08/26/2024 06:46 AM    BILITOT 0.3 08/19/2024 07:02 AM    ALKPHOS 73 08/19/2024 07:02 AM    AST 15 08/19/2024 07:02 AM    ALT 10 08/19/2024 07:02 AM

## 2024-08-30 NOTE — PROGRESS NOTES
ACUTE INPATIENT REHABILITATION DISCHARGE  [facility]    Patient Name: Amina Woods  MRN: [id number]     Patient discharged in stable condition as per order of attending physician.     AVS provided by nurse at time of discharge, which includes all necessary medical information pertaining to the patients current course of illness, treatment, medications, post-discharge goals of care, and treatment preferences.     Provision of Current Reconciled Medication List to Patient at Discharge   Indicate the route(s) of transmission of the current reconciled medication list to the patient/family/caregiver. Electronic Health Record (e.g. electronic access to patient portal), Verbal (e.g. in person, telephone, video conferencing), and Paper Based (e.g. fax, copies, print outs)     Availability of \"My Chart\" offered to patient as a tool for updated health record.  Steps for activation discussed with patient as mentioned on AVS.      Patient/responsible party verbalize understanding of discharge plan and are in agreement with goal/plan/treatment preferences.     Belongings including Glasses, Hearing Aides bilateral, Dentures upper, clothing  sent with patient/responsible party.      Home medications sent home with patient/responsible party N/A    Car Transfer   Level of assistance required for patient transfer into vehicle:   SUPERVISION OR TOUCHING ASSISTANCE: [place] provides verbal cues and/or touching/steadying and/or contact guard assistance as patient completes activity. Assistance may be provided throughout the activity or intermittently.     High-Risk Drug Classes: Use and Indication   Check if the patient is taking any medications by pharmacological classification If yes, check if there is an indication noted for all meds in the drug class   Antipsychotic No If yes: indication noted?  [] If no indication noted, follow up with provider for order clarification   Anticoagulant No If yes: indication noted?  []     Antibiotic No If yes: indication noted?  []    Opioid No If yes: indication noted?  []    Antiplatelet Yes If yes: indication noted?  []    Hypoglycemic (Including Insulin) No If yes: indication noted?  []        Pain Assessment   Over the past 5 days, how much of the time has pain made it hard for you to sleep at night?   Rarely or not at all   Over the past 5 days, how often have you limited your participation in rehabilitation therapy sessions due to pain?   Rarely or not at all   Over the past 5 days, how often have you limited your day-to-day activities (excluding rehabilitation therapy session)?   Rarely or not at all     Special Treatments, Procedures, and Programs   Check all of the following treatments, procedures, and programs that apply on admission.    Cancer Treatments   Chemotherapy No   If Yes, Check All That Apply   []IV Chemotherapy    []Oral Chemotherapy    [] Chemotherapy    Radiation No   Respiratory Therapies   Oxygen Therapy No  If Yes, Check All That Apply   []Continuous   []Intermittent    []High-Concentration    Suctioning No  If Yes, Check All That Apply   []Scheduled   []As Needed   Tracheostomy Care No   Invasive Mechanical Ventilator   (Ventilator or Respirator) No  If Yes, Check All That Apply   [] Non-invasive Mechanical Ventilator   []BiPAP   []CPAP   Other   IV Medications No  If Yes, Check All That Apply   [] IV Vasoactive Medications   []IV Antibiotics   []IV Anticoagulation   [] IV Medications   Transfusions No   Dialysis No  If Yes, Check All That Apply   []Hemodialysis   [] Dialysis   IV Access No  If Yes, Check All That Apply   []Peripheral   []Midline   [] (PICC, tunneled, port)

## 2024-08-30 NOTE — PROGRESS NOTES
has an adjustable bed at home if needed HOB raised.  Transfers  Sit to Stand: Supervision  Stand to Sit: Supervision  Bed to Chair: Supervision  Stand Pivot Transfers: Supervision  Car Transfer: Contact guard assistance (CGA with RLE, placed a plastic bag on seat allowing pt to turn without struggle.)  Comment: Pt demonstrated ability to get into and out of her own car that her niece drove here. Pt reports \"that was much easier\". CGA to Right LE 3 inches into the car.    Environmental Mobility  Ambulation  WB Status: WBAT RLE no SLR  Ambulation  Surface: Level tile  Device: Rolling Walker  Assistance: Modified Independent  Quality of Gait: good step length, good heel/toe gait, steady, head and gaze is downward but does look up a little at times.  Gait Deviations: Deviated path  Distance: 200ft  Comments: ambulate down the hallway and back  More Ambulation?: Yes  Ambulation 2  Surface - 2: ramp  Device 2: Rolling Walker  Assistance 2: Supervision  Quality of Gait 2: weight shifting and advancing RW without difficulties, good safe use of AD  Gait Deviations: Slow Tiesha  Distance: 80ft  Comments: pt demonstrated good understanding from previous education.  Stairs/Curb  Stairs?: Yes  Stairs  # Steps : 4  Stairs Height: 6\" (6\" and 4\")  Rails: Bilateral  Curbs: 6\"  Device: Rolling walker  Assistance: Supervision  Comment: patient plans to enter home via ramp. platform steps to lead into house if needed as well  Patient mobilizing around room to finalize packing for home today    ASSESSMENT       Activity Tolerance  Activity Tolerance: Patient tolerated treatment well       GOALS  Patient Goals   Patient Goals : To go home  Short Term Goals  Time Frame for Short Term Goals: 5-7 days  Short Term Goal 1: bed mobility with SBA  Short Term Goal 2: pt able to transfer from various surfaces with CGA  Short Term Goal 3: pt able to ambulate x 75ft with RW andf SBA  Short Term Goal 4: pt able to perform 1 curb sized step with RW

## 2024-08-30 NOTE — PLAN OF CARE
Problem: Skin/Tissue Integrity  Goal: Absence of new skin breakdown  Description: 1.  Monitor for areas of redness and/or skin breakdown  2.  Assess vascular access sites hourly  3.  Every 4-6 hours minimum:  Change oxygen saturation probe site  4.  Every 4-6 hours:  If on nasal continuous positive airway pressure, respiratory therapy assess nares and determine need for appliance change or resting period.  8/30/2024 0056 by Gale Mckeon LPN  Outcome: Progressing     Problem: Safety - Adult  Goal: Free from fall injury  8/30/2024 0056 by Gale Mckeon LPN  Outcome: Progressing     Problem: Pain  Goal: Verbalizes/displays adequate comfort level or baseline comfort level  8/30/2024 0056 by Gale Mckeon LPN  Outcome: Progressing

## 2024-08-30 NOTE — CARE COORDINATION
[facility] Acute Inpatient Rehabilitation  Case Management Discharge Note    Discharge Disposition: Home Alone    Discharge Transportation Method: family ,  Time: tbd    IMM Letter Status: Patient/Responsible Party agreeable with discharge: Second Signature Obtained, Patient/Responsible Party offered four hours to make informed decision regarding appeal process - Completed Letter Placed in Patient Chart    Home Healthcare Services: Not Applicable    Outpatient Therapy Services: Location: [facility]    DME: No DME Needs Identified    Current reconciled medication list sent to subsequent provider via: Electronic Health Record      Patient Health Questionnaire-9 (PHQ-2 to 9)   Over the last 2 weeks, how often have you been bothered by any of the following problems?    1. Little Interest or pleasure in doing things?   Never or 1 Day - Score 0    2. Feeling down, depressed or hopeless?   2-6 Days (Several Days) - Score 1    3. Trouble falling or staying asleep, or sleeping too much?   2-6 Days (Several Days) - Score 1    4. Feeling tired or having little energy?   Never or 1 Day - Score 0    5. Poor appetite or overeating?   2-6 Days (Several Days) - Score 1    6. Feeling bad about yourself-or that you are a failure or have let yourself or your family down?   Never or 1 Day - Score 0    7. Trouble concentrating on things, such as reading the newspaper or watching television?    Never or 1 Day - Score 0    8. Moving or speaking so slowly that other people could have noticed? Or the opposite-being so fidgety or restless that you have been moving around a lot more than usual?  Never or 1 Day - Score 0    9. Thoughts that you would be better off dead or of hurting yourself in some way?   Never or 1 Day - Score 0    Total Score: 3  If score is above 15, Notify PM&R Physician    If you checked off any problems, how difficult have these problems made it for you to do your work, take care of things  at home, or get along with other people?    Not Difficult At All       Social Isolation     How often do you feel lonely or isolated from those around you?    Never       Access To Transportation     2.In the past six months to a year, has lack of transportation kept you from medical appointments or from getting your medications?”     No    3.In the past six months to a year, has lack of transportation kept you from non-medical meetings, appointments, work, or from getting things that you need?     No       Rabia Hatch RN  Acute Inpatient Rehabilitation Case Management Department  Ph:[phone] Fax: [phone]

## 2024-08-30 NOTE — PLAN OF CARE
Problem: Discharge Planning  Goal: Discharge to home or other facility with appropriate resources  Outcome: Adequate for Discharge  Flowsheets (Taken 8/30/2024 0800)  Discharge to home or other facility with appropriate resources:   Identify barriers to discharge with patient and caregiver   Arrange for needed discharge resources and transportation as appropriate     Problem: Skin/Tissue Integrity  Goal: Absence of new skin breakdown  Description: 1.  Monitor for areas of redness and/or skin breakdown  2.  Assess vascular access sites hourly  3.  Every 4-6 hours minimum:  Change oxygen saturation probe site  4.  Every 4-6 hours:  If on nasal continuous positive airway pressure, respiratory therapy assess nares and determine need for appliance change or resting period.  8/30/2024 1318 by Kyleigh Cleaning RN  Outcome: Adequate for Discharge  8/30/2024 0056 by Gale Mckeon LPN  Outcome: Progressing     Problem: Safety - Adult  Goal: Free from fall injury  8/30/2024 1318 by Kyleigh Cleaning RN  Outcome: Adequate for Discharge  8/30/2024 0056 by Gale Mckeon LPN  Outcome: Progressing     Problem: ABCDS Injury Assessment  Goal: Absence of physical injury  Outcome: Adequate for Discharge     Problem: Pain  Goal: Verbalizes/displays adequate comfort level or baseline comfort level  8/30/2024 1318 by Kyleigh Cleaning RN  Outcome: Adequate for Discharge  8/30/2024 0056 by Gale Mckeon LPN  Outcome: Progressing     Problem: Nutrition Deficit:  Goal: Optimize nutritional status  Outcome: Adequate for Discharge

## 2024-08-30 NOTE — PROGRESS NOTES
Date:   8/30/2024  Patient name: Amina Woods  Date of admission:  8/18/2024 11:59 AM  MRN:   [id number]  YOB: 1935  PCP: Paradise Estrella MD    Reason for Admission: Debility [R53.81]    Cardiology follow-up: Status post syncopal episode, transient bradycardia and hypotension        Referring physician: Dr Stefania Brown     Impression     Status post syncopal episode/transient bradycardia/hypotension 8/14/2024 while resting on chair after coming out of the bathroom  Post syncope no neurological deficit no arrhythmias noted  Right hip total arthroplasty 8/13/2024  History of fainting episodes since childhood  No history of exertional angina  No history of coronary intervention  Normal LV systolic function ejection fraction 60% no obvious valvular abnormality  Conduction disorder, right bundle branch block  History of postural hypotension has been taking midodrine 2.5 mg as needed  History of hyperlipidemia  Psoriasis  History of previous syncopal episode/neurocardiogenic syncope  Moderate hiatal hernia  Postop anemia hemoglobin 8.1 on 8/19/2024     Past surgical history bilateral knee replacement, hysterectomy      Investigation workup     ECG 8/15/2024  Sinus rhythm, normal CO interval ,right bundle branch block, no diagnostic change from 7/30/2024     Chest x-ray 8/15/2024  Low lung volumes no acute cardiopulmonary process  Moderate hiatal hernia    ECG 7/30/2024  Normal sinus rhythm heart rate 79, low voltage, right bundle branch block, heart rate 90     2D echo 8/14/2024  Ejection fraction 60 to 65% mild LVH, normal wall motion normal LV size  Normal RV size and function  No significant valvular abnormality  Normal IVC diameter and respiratory variation  No pericardial effusion     Lexiscan  Myoview stress test March 14, 2014   no ischemia no infarct    History of present illness     89-year-old female who lives alone, independent in ADL, her daughter lives next door  She underwent right

## 2024-08-30 NOTE — PROGRESS NOTES
[facility]   Acute Rehabilitation Occupational Therapy Daily Treatment Note    Date: 24  Patient Name: Amina Woods       Room: 2609/2609-01  MRN: [id number]  Account: [id number]   : 1935  (89 y.o.) Gender: female       Referring Practitioner: Meghan Mcmahon MD  Diagnosis: S/P hip replacement, right  Additional Pertinent Hx: Per H&P: Amina Woods is a 89 y.o. female with history of psoriasis, HLD admitted to [facility] on 2024.  She was originally admitted to [facility] on 24.     She initially presented for surgical intervention for right hip osteoarthritis.  She underwent total hip arthroplasty on 24 (Dr. See).  She is WBAT to the right lower limb.  Rapid response/code blue was called on 24 for unresponsiveness but she never lost a pulse.  She was noted to have bradycardia and hypotension at that time.     She is currently requiring assistance for self-care activities and mobility prompting this admission.  She reports ongoing pain in the right lower limb.  She also notes right lower limb edema and constipation.  She denies any chest pain, palpitations, and shortness of breath    Treatment Diagnosis: Impaired self-care status    Past Medical History:  has a past medical history of Basal cell carcinoma, Blood in feces, Hyperlipidemia, Mild hyperglycemia., Orthostatic hypotension, Osteoarthritis, Post-menopausal, Pre-cancerous skin lesions., Prolonged emergence from general anesthesia, Psoriasis, and Syncope, non cardiac.    Past Surgical History:   has a past surgical history that includes Hysterectomy; Colon surgery; Colonoscopy (, ); joint replacement (); Foot surgery; Appendectomy; Skin cancer excision; Skin cancer destruction; sigmoidoscopy (); Cataract removal with implant (Bilateral); and Total hip arthroplasty (Right, 2024).    Restrictions  Restrictions/Precautions  Restrictions/Precautions:  independent  Skilled Clinical Factors: Able to complete clothing management and hygiene safely.    Toilet Transfers  Technique:  (Ambulating with rw.)  Equipment: Standard toilet;Grab bars  Assistance Level: Modified independent  Skilled Clinical Factors: G hand placement and G controlled descend.    Tub/Shower Transfers  Type: Shower  Transfer From: Rolling walker  Transfer To: Tub transfer bench  Assistance Level: Contact guard assist  Skilled Clinical Factors: Pt continues to be nervous about maneuvering over threshold to enter shower. CGA provided for patient comfort and for safety.    Mobility     Sit to Stand  Assistance Level: Modified independent  Skilled Clinical Factors: G hand placement.  Stand to Sit  Assistance Level: Modified independent  Skilled Clinical Factors: G hand placement and G controlled descend .    Functional Mobility  Device: Rolling walker  Activity: To/From bathroom;Retrieve items;Transport items  Assistance Level: Modified independent    OT Exercises  Exercise Treatment: Pt verbalizes and demonstrates G understanding of B UE HEP and plans to continue at home.    Assessment  Assessment  Activity Tolerance: Patient tolerated treatment well  Discharge Recommendations: Home with assist PRN    Patient Education  Education  Education Given To: Patient  Education Provided: Role of Therapy;Plan of Care;Safety;ADL Function;Transfer Training;Mobility Training;Energy Conservation;Fall Prevention Strategies  Education Provided Comments: Anterior hip precautions, AD/AE/modified techniques, home safety, fall prevention, energy conservation and work simplification techniques to allow for optimal indep and safety upon discharge to home.  Education Method: Verbal;Demonstration;Teach Back  Barriers to Learning: None  Education Outcome: Verbalized understanding;Demonstrated understanding    Safety Devices  Safety Devices in place: Yes  Type of devices: All fall risk precautions in place     Goals  Patient

## 2024-09-03 ENCOUNTER — TELEPHONE (OUTPATIENT)
Dept: ORTHOPEDIC SURGERY | Age: 89
End: 2024-09-03

## 2024-09-03 ENCOUNTER — CARE COORDINATION (OUTPATIENT)
Dept: CARE COORDINATION | Age: 89
End: 2024-09-03

## 2024-09-03 DIAGNOSIS — Z96.641 S/P HIP REPLACEMENT, RIGHT: Primary | ICD-10-CM

## 2024-09-03 PROCEDURE — 1111F DSCHRG MED/CURRENT MED MERGE: CPT | Performed by: FAMILY MEDICINE

## 2024-09-03 NOTE — TELEPHONE ENCOUNTER
Neisha, care transitions nurse, called this afternoon regarding this pt.     She says that she was discharged home over the weekend and was initially planning to go with her daughter to outpatient therapy, but they do not take her insurance at that location. She does not have a ride to PT at another location.    Neisha would like to know if Dr. See would be amenable to ordering home healthcare PT for this pt. She prefers [facility] if possible.    Please call [phone], voicemail is OK.

## 2024-09-03 NOTE — TELEPHONE ENCOUNTER
Karolyn, listed on 7/10/24 release of information, asked for physical therapy referral to be faxed to [facility]. Referral e-faxed to [facility] per Karolyn's request.

## 2024-09-03 NOTE — CARE COORDINATION
Care Transitions Note    Initial Call - Call within 2 business days of discharge: Yes    Attempted to reach patient for transitions of care follow up. Unable to reach patient.    Outreach Attempts:   1st attempt to reach, home number not working number, left message on mobile number.    Patient: Amina Woods    Patient : 1935   MRN: [id number]    Reason for Admission: RTH   Discharge Date: 24  RURS: Readmission Risk Score: 10.6    Last Discharge Facility       Date Complaint Diagnosis Description Type Department Provider    24  S/P hip replacement, right ... Admission (Discharged) [facility] Meghan Mcmahon MD            Was this an external facility discharge? No    Follow Up Appointment:   Patient has hospital follow up appointment scheduled     Future Appointments         Provider Specialty Dept Phone    10/9/2024 11:15 AM Aristeo See MD Orthopedic Surgery [phone]    10/14/2024 9:00 AM Ja Carmen DPM Podiatry [phone]    10/18/2024 2:30 PM Paradise Estrella MD Primary Care [phone]            Plan for follow-up on next business day.      Neisha Andrea RN

## 2024-09-03 NOTE — CARE COORDINATION
Care Transitions Note    Initial Call - Call within 2 business days of discharge: Yes    Patient Current Location:  Home: [address] [place] [id number]    Care Transition Nurse contacted the patient by telephone to perform post hospital discharge assessment, verified name and  as identifiers. Provided introduction to self, and explanation of the Care Transition Nurse role.     Patient: Amina Woods    Patient : 1935   MRN: [id number]    Reason for Admission: Right total hip replacement  Discharge Date: 24  RURS: Readmission Risk Score: 10.6      Last Discharge Facility       Date Complaint Diagnosis Description Type Department Provider    24  S/P hip replacement, right ... Admission (Discharged) [facility] Meghan Mcmahon MD            Was this an external facility discharge? No    Additional needs identified to be addressed with provider   Patient went to OP rehab today with daughter at [facility].  Unfortunately they don't take her insurance.  She would like home therapy if possible.  Was doing OP therapy because that is where her daughter goes for therapy and was going to just ride with her.              Method of communication with provider: phone.    Patients top risk factors for readmission: medical condition-TRH    Interventions to address risk factors:   Education: Hip precautions, monitor for s/s of infection     Care Summary Note: Spoke with patient today for initial 24 hour follow up call. Patient is doing ok today, states she is feeling good, getting around with her walker at home. She had total hip replacement to right, had some issues with low HR during her post operative period.  She did go to the ARU for additional therapy and discharged to home.  She has minimal pain, has been using Tylenol prn.  Denies any swelling present, incision is just a line that is healed now, she reports no s/s of infection.  Patient was to do outpatient therapy at discharge.  She states she

## 2024-09-05 ENCOUNTER — CARE COORDINATION (OUTPATIENT)
Dept: CARE COORDINATION | Age: 89
End: 2024-09-05

## 2024-09-05 NOTE — CARE COORDINATION
Transitions Subsequent and Final Call    Schedule Follow Up Appointment with PCP: Completed  Subsequent and Final Calls  Do you have any ongoing symptoms?: Yes  Onset of Patient-reported symptoms: Today  Patient-reported symptoms: Other  Have your medications changed?: No  Do you have any questions related to your medications?: No  Do you currently have any active services?: Yes  Are you currently active with any services?: Other  Do you have any needs or concerns that I can assist you with?: No  Identified Barriers: None  Care Transitions Interventions  Other Interventions:              Follow Up Appointment:   Reviewed upcoming appointment(s).  Future Appointments         Provider Specialty Dept Phone    10/9/2024 11:15 AM Airsteo See MD Orthopedic Surgery [phone]    10/14/2024 9:00 AM Ja Carmen DPM Podiatry [phone]    10/18/2024 2:30 PM Paradise Estrella MD Primary Care [phone]            Care Transition Nurse provided contact information.  Plan for follow-up call in 6-10 days based on severity of symptoms and risk factors.  Plan for next call:  check how therapy is going, how are bowels, how is swelling to hip?      Neisha Andrea RN

## 2024-09-11 ENCOUNTER — CARE COORDINATION (OUTPATIENT)
Dept: CARE COORDINATION | Age: 89
End: 2024-09-11

## 2024-09-18 ENCOUNTER — CARE COORDINATION (OUTPATIENT)
Dept: CARE COORDINATION | Age: 89
End: 2024-09-18

## 2024-09-26 ENCOUNTER — CARE COORDINATION (OUTPATIENT)
Dept: CARE COORDINATION | Age: 89
End: 2024-09-26

## 2024-10-01 ENCOUNTER — CARE COORDINATION (OUTPATIENT)
Dept: CARE COORDINATION | Age: 89
End: 2024-10-01

## 2024-10-01 NOTE — CARE COORDINATION
Care Transitions Note    Follow Up Call     Attempted to reach patient for transitions of care follow up.  Unable to reach patient.      Outreach Attempts: # 2 Will resole episode if no return call.   HIPAA compliant voicemail left for patient.     Care Summary Note: 2nd attempt     Follow Up Appointment:   Future Appointments         Provider Specialty Dept Phone    10/9/2024 11:15 AM Aristeo See MD Orthopedic Surgery [phone]    10/14/2024 9:00 AM Ja Carmen DPM Podiatry [phone]    10/18/2024 2:30 PM Paradise Estrella MD Primary Care [phone]            No further follow-up call indicated based on severity of symptoms and risk factors. Kamryn Leung LPN

## 2024-10-09 ENCOUNTER — OFFICE VISIT (OUTPATIENT)
Dept: ORTHOPEDIC SURGERY | Age: 89
End: 2024-10-09

## 2024-10-09 ENCOUNTER — TELEPHONE (OUTPATIENT)
Dept: ORTHOPEDIC SURGERY | Age: 89
End: 2024-10-09

## 2024-10-09 DIAGNOSIS — Z96.641 STATUS POST RIGHT HIP REPLACEMENT: Primary | ICD-10-CM

## 2024-10-09 PROCEDURE — 99024 POSTOP FOLLOW-UP VISIT: CPT | Performed by: ORTHOPAEDIC SURGERY

## 2024-10-09 NOTE — PROGRESS NOTES
Amina returns today status post right total hip ASI on 8/13/2024.  Overall she is doing extremely well she says the hip pain is so much better than it was before she is utilizing a walker a lot time she states that she forgets to give to grab it.  She still working physical therapy at [facility].  Overall very pleased    Physical examination no second motion her hip and extremely well any discomfort whatsoever.  She has some slight meralgia paresthetica she has a little bit of weakness on her hip flexion but not terrible she does have a slight leg length discrepancy approximately a centimeter right greater than left.    No new x-rays taken today    Impression  Status post right total hip ASI on 8/13/2024    Plan  I informed the patient and her family member here with her then for 8 weeks for 89-year-old lady I think she is doing extremely well she is very pleased the results she is hoping to progress to just utilizing a cane and I told her they would be working with her in physical therapy for this.  Will see her back here in 2 months call if any problems prior to that time

## 2024-10-09 NOTE — TELEPHONE ENCOUNTER
PATRICIA Rey    Patient is scheduled to see you later on today on 10/9/24 for a post op appointment for right total hip 8/13/24.     The patient's daughter called with concerns about her mother's gait and balance issues since surgery. Daughter will not be able to attend appointment today to talk with you directly however she wants to know if you believe a bilateral hip x-ray today may be useful in determining if the patient is suffering from a tilted pelvis or leg length discrepancy.    I suggested that the other family member that will be attending the appointment with the patient today discuss this with you directly. Daughter voiced understanding.

## 2024-10-14 ENCOUNTER — OFFICE VISIT (OUTPATIENT)
Dept: PODIATRY | Age: 89
End: 2024-10-14
Payer: MEDICARE

## 2024-10-14 VITALS — WEIGHT: 163 LBS | BODY MASS INDEX: 28.88 KG/M2 | HEIGHT: 63 IN

## 2024-10-14 DIAGNOSIS — I73.9 PVD (PERIPHERAL VASCULAR DISEASE) (HCC): ICD-10-CM

## 2024-10-14 DIAGNOSIS — B35.1 DERMATOPHYTOSIS OF NAIL: Primary | ICD-10-CM

## 2024-10-14 DIAGNOSIS — M79.604 BILATERAL LOWER EXTREMITY PAIN: ICD-10-CM

## 2024-10-14 DIAGNOSIS — M19.072 DEGENERATIVE JOINT DISEASE OF BOTH ANKLES AND FEET: ICD-10-CM

## 2024-10-14 DIAGNOSIS — M79.605 BILATERAL LOWER EXTREMITY PAIN: ICD-10-CM

## 2024-10-14 DIAGNOSIS — M19.071 DEGENERATIVE JOINT DISEASE OF BOTH ANKLES AND FEET: ICD-10-CM

## 2024-10-14 DIAGNOSIS — M19.91 PRIMARY OSTEOARTHRITIS, UNSPECIFIED SITE: ICD-10-CM

## 2024-10-14 PROCEDURE — 99203 OFFICE O/P NEW LOW 30 MIN: CPT | Performed by: PODIATRIST

## 2024-10-14 PROCEDURE — 1123F ACP DISCUSS/DSCN MKR DOCD: CPT | Performed by: PODIATRIST

## 2024-10-14 PROCEDURE — 11721 DEBRIDE NAIL 6 OR MORE: CPT | Performed by: PODIATRIST

## 2024-10-14 RX ORDER — PREDNISOLONE ACETATE 10 MG/ML
1 SUSPENSION/ DROPS OPHTHALMIC
COMMUNITY
Start: 2024-07-22

## 2024-10-14 RX ORDER — DICLOFENAC SODIUM 75 MG/1
75 TABLET, DELAYED RELEASE ORAL
COMMUNITY
Start: 2024-09-24 | End: 2024-10-18

## 2024-10-14 NOTE — PROGRESS NOTES
[facility] PODIATRY  [address] [place], [place] [id number]  Dept: [phone]    NEW PATIENT NAIL PAIN PROGRESS NOTE  Date of patient's visit: 10/14/2024  Patient's Name:  Amina Woods YOB: 1935            Patient Care Team:  Paradise Estrella MD as PCP - General (Family Medicine)  Paradise Estrella MD as PCP - Empaneled Provider  Leola Loza (Inactive) as Consulting Physician (Dermatology)  William Presley MD as Consulting Physician (Gastroenterology)  Leola Loza (Inactive) as Consulting Physician (Dermatology)  William Presley MD as Consulting Physician (Gastroenterology)  Paradise Estrella MD (Family Medicine)  Result, Unknown Provider (Inactive)  Result, Unknown Provider (Inactive)      Chief Complaint   Patient presents with    New Patient    Foot Pain    Nail Problem     Nail trim       Subjective: This Amina Woods comes to clinic for foot and nail care.  Pt currently has complaint of thickened, painful, elongated nails that he/she cannot manage by themselves.  Pt. Relates pain to nails with shoe gear.  Pt's primary care physician is Paradise Estrella MD last seen July 12 2024   Past Medical History:   Diagnosis Date    Basal cell carcinoma     mulitple    Blood in feces 09/19/2016    Hyperlipidemia 08/30/2011    Mild hyperglycemia. 08/30/2011    Orthostatic hypotension 08/30/2011    Osteoarthritis     knees, thumbs    Post-menopausal     Pre-cancerous skin lesions. 08/30/2011    Prolonged emergence from general anesthesia     Psoriasis 08/30/2011    Syncope, non cardiac     \"vascular syncope\" per patient - last episode was 2023       Allergies   Allergen Reactions    Latex Rash    Adhesive Tape Other (See Comments)     Tears her skin    Anesthetic [Benzocaine]      Difficulty waking    Aspercreme Lidocaine [Lidocaine]     Benzocaine-Benzethonium      Difficulty waking    Pcn

## 2024-10-18 ENCOUNTER — OFFICE VISIT (OUTPATIENT)
Dept: PRIMARY CARE CLINIC | Age: 89
End: 2024-10-18
Payer: MEDICARE

## 2024-10-18 VITALS
WEIGHT: 163.8 LBS | SYSTOLIC BLOOD PRESSURE: 136 MMHG | HEART RATE: 86 BPM | DIASTOLIC BLOOD PRESSURE: 66 MMHG | OXYGEN SATURATION: 97 % | BODY MASS INDEX: 29.02 KG/M2

## 2024-10-18 DIAGNOSIS — Z00.00 MEDICARE ANNUAL WELLNESS VISIT, SUBSEQUENT: Primary | ICD-10-CM

## 2024-10-18 DIAGNOSIS — E55.9 VITAMIN D DEFICIENCY: ICD-10-CM

## 2024-10-18 DIAGNOSIS — R42 DIZZINESS: ICD-10-CM

## 2024-10-18 DIAGNOSIS — M85.80 OSTEOPENIA, UNSPECIFIED LOCATION: ICD-10-CM

## 2024-10-18 DIAGNOSIS — Z23 NEED FOR VACCINATION: ICD-10-CM

## 2024-10-18 DIAGNOSIS — D64.9 ANEMIA, UNSPECIFIED TYPE: ICD-10-CM

## 2024-10-18 PROCEDURE — G0008 ADMIN INFLUENZA VIRUS VAC: HCPCS | Performed by: FAMILY MEDICINE

## 2024-10-18 PROCEDURE — G0439 PPPS, SUBSEQ VISIT: HCPCS | Performed by: FAMILY MEDICINE

## 2024-10-18 PROCEDURE — 90653 IIV ADJUVANT VACCINE IM: CPT | Performed by: FAMILY MEDICINE

## 2024-10-18 PROCEDURE — 1123F ACP DISCUSS/DSCN MKR DOCD: CPT | Performed by: FAMILY MEDICINE

## 2024-10-18 RX ORDER — FERROUS SULFATE 325(65) MG
325 TABLET ORAL
COMMUNITY
Start: 2024-10-18

## 2024-10-18 ASSESSMENT — LIFESTYLE VARIABLES
HOW MANY STANDARD DRINKS CONTAINING ALCOHOL DO YOU HAVE ON A TYPICAL DAY: PATIENT DOES NOT DRINK
HOW OFTEN DO YOU HAVE A DRINK CONTAINING ALCOHOL: NEVER

## 2024-10-18 ASSESSMENT — PATIENT HEALTH QUESTIONNAIRE - PHQ9
SUM OF ALL RESPONSES TO PHQ9 QUESTIONS 1 & 2: 0
SUM OF ALL RESPONSES TO PHQ QUESTIONS 1-9: 0
2. FEELING DOWN, DEPRESSED OR HOPELESS: NOT AT ALL
1. LITTLE INTEREST OR PLEASURE IN DOING THINGS: NOT AT ALL

## 2024-10-18 NOTE — PROGRESS NOTES
Medicare Annual Wellness Visit    Amina Simpsong is here for Medicare AWV (Pt here today for Medicare Annual Wellness Visit. Pt given three words to remember: Lion, Purple, Angry. Time given to draw was 11:15./), Discuss Labs (Patient would like to discuss lab work done in August.), and Flu Vaccine (Would like to discuss.)    Assessment & Plan   Medicare annual wellness visit, subsequent  Anemia, unspecified type  -     ferrous sulfate (IRON 325) 325 (65 Fe) MG tablet; Take 1 tablet by mouth daily (with breakfast)OTC  -     Comprehensive Metabolic Panel; Future  -     Magnesium; Future  -     TSH; Future  -     CBC with Auto Differential; Future  -     Iron; Future  -     Ferritin; Future  -     Vitamin B12 & Folate; Future  -     Vitamin D 25 Hydroxy; Future  Vitamin D deficiency  -     ferrous sulfate (IRON 325) 325 (65 Fe) MG tablet; Take 1 tablet by mouth daily (with breakfast)OTC  -     Comprehensive Metabolic Panel; Future  -     Magnesium; Future  -     TSH; Future  -     CBC with Auto Differential; Future  -     Iron; Future  -     Ferritin; Future  -     Vitamin B12 & Folate; Future  -     Vitamin D 25 Hydroxy; Future  Osteopenia, unspecified location  -     ferrous sulfate (IRON 325) 325 (65 Fe) MG tablet; Take 1 tablet by mouth daily (with breakfast)OTC  -     Comprehensive Metabolic Panel; Future  -     Magnesium; Future  -     TSH; Future  -     CBC with Auto Differential; Future  -     Iron; Future  -     Ferritin; Future  -     Vitamin B12 & Folate; Future  -     Vitamin D 25 Hydroxy; Future  Dizziness  -     ferrous sulfate (IRON 325) 325 (65 Fe) MG tablet; Take 1 tablet by mouth daily (with breakfast)OTC  -     Comprehensive Metabolic Panel; Future  -     Magnesium; Future  -     TSH; Future  -     CBC with Auto Differential; Future  -     Iron; Future  -     Ferritin; Future  -     Vitamin B12 & Folate; Future  -     Vitamin D 25 Hydroxy; Future    Recommendations for Preventive Services Due:

## 2024-10-28 ENCOUNTER — TELEPHONE (OUTPATIENT)
Dept: PRIMARY CARE CLINIC | Age: 89
End: 2024-10-28

## 2024-10-28 DIAGNOSIS — R42 DIZZINESS: ICD-10-CM

## 2024-10-28 DIAGNOSIS — M85.80 OSTEOPENIA, UNSPECIFIED LOCATION: ICD-10-CM

## 2024-10-28 DIAGNOSIS — D64.9 ANEMIA, UNSPECIFIED TYPE: ICD-10-CM

## 2024-10-28 DIAGNOSIS — E55.9 VITAMIN D DEFICIENCY: ICD-10-CM

## 2024-10-28 RX ORDER — FERROUS SULFATE 325(65) MG
325 TABLET ORAL 2 TIMES DAILY
Qty: 60 TABLET | Refills: 5 | Status: SHIPPED | OUTPATIENT
Start: 2024-10-28

## 2024-10-28 NOTE — TELEPHONE ENCOUNTER
Patient asking if she can go back on the twice daily iron due to feeling sluggish and tired since reducing down to 1 daily. Would need a refill sent to [facility]

## 2024-11-12 LAB
ALBUMIN: NORMAL
ALP BLD-CCNC: NORMAL U/L
ALT SERPL-CCNC: NORMAL U/L
ANION GAP SERPL CALCULATED.3IONS-SCNC: NORMAL MMOL/L
AST SERPL-CCNC: NORMAL U/L
BILIRUB SERPL-MCNC: NORMAL MG/DL
BUN BLDV-MCNC: NORMAL MG/DL
CALCIUM SERPL-MCNC: NORMAL MG/DL
CHLORIDE BLD-SCNC: NORMAL MMOL/L
CO2: NORMAL
CREAT SERPL-MCNC: NORMAL MG/DL
FERRITIN: 49 NG/ML (ref 9–150)
GFR, ESTIMATED: 63
GLUCOSE BLD-MCNC: 91 MG/DL
IRON: 70
MAGNESIUM: 2.1 MG/DL
POTASSIUM SERPL-SCNC: NORMAL MMOL/L
SODIUM BLD-SCNC: NORMAL MMOL/L
TOTAL PROTEIN: NORMAL
TSH SERPL DL<=0.05 MIU/L-ACNC: 0.76 UIU/ML
VITAMIN D 25-HYDROXY: 60.5
VITAMIN D2, 25 HYDROXY: NORMAL
VITAMIN D3,25 HYDROXY: NORMAL

## 2024-11-14 ENCOUNTER — OFFICE VISIT (OUTPATIENT)
Dept: ORTHOPEDIC SURGERY | Age: 89
End: 2024-11-14

## 2024-11-14 VITALS — BODY MASS INDEX: 28.88 KG/M2 | WEIGHT: 163 LBS | RESPIRATION RATE: 14 BRPM | HEIGHT: 63 IN

## 2024-11-14 DIAGNOSIS — Z96.641 STATUS POST RIGHT HIP REPLACEMENT: Primary | ICD-10-CM

## 2024-11-14 NOTE — PROGRESS NOTES
[facility]                Ga Trivedi PA-C            [address], [address]               [place], [place] [address]           Dept Phone: [phone]           Dept Fax:  [phone] [address] [facility]                       [address]           [place], [place] [address]          Dept Phone: [phone]           Dept Fax:  [phone]      Chief Compliant:  Chief Complaint   Patient presents with    Leg Pain     Right upper thigh pain from fall        History of Present Illness:  This is a 89 y.o. female who presents to the clinic today for evaluation of had concerns including Leg Pain (Right upper thigh pain from fall).     Ms. Woods is a pleasant 89-year-old female status post right total hip arthroplasty by Dr. See on 8/13/2024.  Patient reports she did have some delay getting into outpatient therapy so continues to work on strengthening states overall her hip has been doing well but notes some pain over the lateral thigh and lateral hip.  She states she has had this pain kind of since the surgery but she recently had a mechanical fall in which she lost her balance and landed on her buttock/tailbone and since then she has had increasing pain to the right thigh more so than the buttock or tailbone area itself.    She is more here to rule out any injury to the total hip components.  She has been ambulatory since her fall on Sunday, 11/10/2024.  She is no longer using assistive vices for majority of walking but sometimes will rely on her walker for prolonged periods of walking.  Continues working with [facility] physical therapy       Past History:    Current Outpatient Medications:     cyanocobalamin 1000 MCG tablet, Take 1 tablet by mouth daily, Disp: 90 tablet, Rfl: 3    ferrous sulfate (IRON 325) 325 (65 Fe) MG tablet, Take 1 tablet by mouth in the morning and at bedtime, Disp: 60 tablet, Rfl: 5    prednisoLONE acetate (PRED FORTE) 1 % ophthalmic

## 2024-11-18 RX ORDER — DICLOFENAC SODIUM 75 MG/1
75 TABLET, DELAYED RELEASE ORAL 2 TIMES DAILY
Qty: 180 TABLET | Refills: 2 | Status: SHIPPED | OUTPATIENT
Start: 2024-11-18

## 2024-11-21 LAB
BASOPHILS ABSOLUTE: NORMAL
BASOPHILS RELATIVE PERCENT: NORMAL
EOSINOPHILS ABSOLUTE: NORMAL
EOSINOPHILS RELATIVE PERCENT: NORMAL
HCT VFR BLD CALC: NORMAL %
HEMOGLOBIN: NORMAL
LYMPHOCYTES ABSOLUTE: NORMAL
LYMPHOCYTES RELATIVE PERCENT: NORMAL
MCH RBC QN AUTO: NORMAL PG
MCHC RBC AUTO-ENTMCNC: NORMAL G/DL
MCV RBC AUTO: NORMAL FL
MONOCYTES ABSOLUTE: NORMAL
MONOCYTES RELATIVE PERCENT: NORMAL
NEUTROPHILS ABSOLUTE: NORMAL
NEUTROPHILS RELATIVE PERCENT: NORMAL
PDW BLD-RTO: NORMAL %
PLATELET # BLD: NORMAL 10*3/UL
PMV BLD AUTO: NORMAL FL
RBC # BLD: NORMAL 10*6/UL
WBC # BLD: 5.7 10^3/ML

## 2024-11-22 DIAGNOSIS — R42 DIZZINESS: ICD-10-CM

## 2024-11-22 DIAGNOSIS — E55.9 VITAMIN D DEFICIENCY: ICD-10-CM

## 2024-11-22 DIAGNOSIS — M85.80 OSTEOPENIA, UNSPECIFIED LOCATION: ICD-10-CM

## 2024-11-22 DIAGNOSIS — D64.9 ANEMIA, UNSPECIFIED TYPE: ICD-10-CM

## 2024-12-16 ENCOUNTER — OFFICE VISIT (OUTPATIENT)
Dept: PODIATRY | Age: 88
End: 2024-12-16
Payer: MEDICARE

## 2024-12-16 VITALS — BODY MASS INDEX: 28.88 KG/M2 | HEIGHT: 63 IN | WEIGHT: 163 LBS

## 2024-12-16 DIAGNOSIS — B35.1 DERMATOPHYTOSIS OF NAIL: Primary | ICD-10-CM

## 2024-12-16 DIAGNOSIS — M79.604 BILATERAL LOWER EXTREMITY PAIN: ICD-10-CM

## 2024-12-16 DIAGNOSIS — M79.605 BILATERAL LOWER EXTREMITY PAIN: ICD-10-CM

## 2024-12-16 DIAGNOSIS — I73.9 PVD (PERIPHERAL VASCULAR DISEASE) (HCC): ICD-10-CM

## 2024-12-16 DIAGNOSIS — L85.3 XEROSIS OF SKIN: ICD-10-CM

## 2024-12-16 PROCEDURE — 1123F ACP DISCUSS/DSCN MKR DOCD: CPT | Performed by: PODIATRIST

## 2024-12-16 PROCEDURE — 99213 OFFICE O/P EST LOW 20 MIN: CPT | Performed by: PODIATRIST

## 2024-12-16 PROCEDURE — 1159F MED LIST DOCD IN RCRD: CPT | Performed by: PODIATRIST

## 2024-12-16 PROCEDURE — 1125F AMNT PAIN NOTED PAIN PRSNT: CPT | Performed by: PODIATRIST

## 2024-12-16 PROCEDURE — 11721 DEBRIDE NAIL 6 OR MORE: CPT | Performed by: PODIATRIST

## 2024-12-16 NOTE — PROGRESS NOTES
Left    Color  [x] [x] [x] [x] [x] [x] [x] [x] [x] [x]  5 4 3 2 1 1 2 3 4 5                          Right                                        Left    Incurvation/Ingrowin   [] [] [] [] [] [] [] [] [] []  5 4 3 2 1 1 2 3 4 5                         Right                                        Left    Inflammation/Pain   [x] [x] [x] [x] [x] [x] [x] [x] [x] [x]  5 4 3  2 1 1 2 3 4 5                         Right                                        Left       Nails are painful 1-10 with direct palpation.      Q7   []Yes  []No                Q8   [x]Yes  []No                     Q9   []Yes    []No  Assessment:  89 y.o. female with:    Diagnosis Orders   1. Dermatophytosis of nail  DEBRIDEMENT OF NAILS, 6 OR MORE      2. Bilateral lower extremity pain  DEBRIDEMENT OF NAILS, 6 OR MORE      3. PVD (peripheral vascular disease) (HCC)  DEBRIDEMENT OF NAILS, 6 OR MORE      4. Xerosis of skin              Plan:   Pt was evaluated and examined. Patient was given personalized discharge instructions.       For patients xerosis of skin pt to apply OTC foot cream or Aquaphor to the area to be applied daily.  Pt to use a pummuce stone to the plantar surface of the feet weekly     Nails 1-10 were debrided in length and thickness sharply with a nail nipper and  without incident. Pt will follow up in 9 weeks or sooner if any problems arise. Diagnosis was discussed with the pt and all of their questions were answered in detail. Proper foot hygiene and care was discussed with the pt. Patient to check feet daily and contact the office with any questions/problems/concerns.  Other comorbidity noted and will be managed by PCP.  Pain waiver discussed with patient and confirmed.    All labs were reviewed and all imaging including the above findings were reviewed PRIOR to the patients arrival and with the patient today.    Previous patient encounter was reviewed.  Encounters from the patients other medical providers were reviewed

## 2025-02-24 ENCOUNTER — OFFICE VISIT (OUTPATIENT)
Dept: PODIATRY | Age: 89
End: 2025-02-24
Payer: MEDICARE

## 2025-02-24 DIAGNOSIS — M79.604 BILATERAL LOWER EXTREMITY PAIN: ICD-10-CM

## 2025-02-24 DIAGNOSIS — I73.9 PVD (PERIPHERAL VASCULAR DISEASE): ICD-10-CM

## 2025-02-24 DIAGNOSIS — B35.1 DERMATOPHYTOSIS OF NAIL: Primary | ICD-10-CM

## 2025-02-24 DIAGNOSIS — M79.605 BILATERAL LOWER EXTREMITY PAIN: ICD-10-CM

## 2025-02-24 PROCEDURE — 11721 DEBRIDE NAIL 6 OR MORE: CPT | Performed by: PODIATRIST

## 2025-02-24 NOTE — PROGRESS NOTES
seen dorsal foot   Musculoskeletal:     1st MPJ ROM decreased, Bilateral.  Muscle strength 5/5, Bilateral.  Pain present upon palpation of toenails 1-5, Bilateral. decreased medial longitudinal arch, Bilateral.  Ankle ROM decreased,Bilateral.    Dorsally contracted digits present digits 2, Bilateral.     Vascular: DP pulses 1/4 bilateral.  PT pulses 0/4 bilateral.   CFT <5 seconds, Bilateral.  Hair growth absent to the level of the digits, Bilateral.  Edema present, Bilateral.  Varicosities absent, Bilateral. Erythema absent, Bilateral    Neurological: Sensation diminshed to light touch to level of digits, Bilateral.  Protective sensation intact 6/10 sites via 5.07/10g [place] Monofilament, Bilateral.  negative Tinel's, Bilateral.  negative Valleix sign, Bilateral.      Integument: Warm, dry, supple, Bilateral.  Open lesion absent, Bilateral.  Interdigital maceration absent to web spaces 4, Bilateral.  Nails 1-5 left and 1-5 right thickened > 3.0 mm, dystrophic and crumbly, discolored with subungual debris.  Fissures absent, Bilateral.   General: AAO x 3 in NAD.    Derm  Toenail Description  Sites of Onychomycosis Involvement (Check affected area)  [x] [x] [x] [x] [x] [x] [x] [x] [x] [x]  5 4 3 2 1 1 2 3 4 5                          Right                                        Left    Thickness  [x] [x] [x] [x] [x] [x] [x] [x] [x] [x]  5 4 3 2 1 1 2 3 4 5                         Right                                        Left    Dystrophic Changes   [x] [x] [x] [x] [x] [x] [x] [x] [x] [x]  5 4 3 2 1 1 2 3 4 5                         Right                                        Left    Color  [x] [x] [x] [x] [x] [x] [x] [x] [x] [x]  5 4 3 2 1 1 2 3 4 5                          Right                                        Left    Incurvation/Ingrowin   [] [] [] [] [] [] [] [] [] []  5 4 3 2 1 1 2 3 4 5                         Right                                        Left    Inflammation/Pain

## 2025-03-04 ENCOUNTER — OFFICE VISIT (OUTPATIENT)
Dept: PRIMARY CARE CLINIC | Age: 89
End: 2025-03-04

## 2025-03-04 VITALS
OXYGEN SATURATION: 95 % | WEIGHT: 163 LBS | DIASTOLIC BLOOD PRESSURE: 72 MMHG | TEMPERATURE: 98.1 F | SYSTOLIC BLOOD PRESSURE: 140 MMHG | BODY MASS INDEX: 28.87 KG/M2 | HEART RATE: 100 BPM

## 2025-03-04 DIAGNOSIS — M21.961 FOOT DEFORMITY, ACQUIRED, RIGHT: ICD-10-CM

## 2025-03-04 DIAGNOSIS — I73.9 PERIPHERAL VASCULAR DISEASE: ICD-10-CM

## 2025-03-04 DIAGNOSIS — L08.9 LOCAL SKIN INFECTION: Primary | ICD-10-CM

## 2025-03-04 RX ORDER — MUPIROCIN 20 MG/G
OINTMENT TOPICAL
Qty: 22 G | Refills: 0 | Status: SHIPPED | OUTPATIENT
Start: 2025-03-04

## 2025-03-04 RX ORDER — SULFAMETHOXAZOLE AND TRIMETHOPRIM 800; 160 MG/1; MG/1
1 TABLET ORAL 2 TIMES DAILY
Qty: 20 TABLET | Refills: 0 | Status: SHIPPED | OUTPATIENT
Start: 2025-03-04 | End: 2025-03-14

## 2025-03-04 ASSESSMENT — ENCOUNTER SYMPTOMS
RESPIRATORY NEGATIVE: 1
COLOR CHANGE: 0

## 2025-03-04 ASSESSMENT — PATIENT HEALTH QUESTIONNAIRE - PHQ9
SUM OF ALL RESPONSES TO PHQ QUESTIONS 1-9: 0
SUM OF ALL RESPONSES TO PHQ QUESTIONS 1-9: 0
1. LITTLE INTEREST OR PLEASURE IN DOING THINGS: NOT AT ALL
2. FEELING DOWN, DEPRESSED OR HOPELESS: NOT AT ALL
SUM OF ALL RESPONSES TO PHQ QUESTIONS 1-9: 0
SUM OF ALL RESPONSES TO PHQ QUESTIONS 1-9: 0

## 2025-03-04 NOTE — PROGRESS NOTES
Cardiovascular: Negative.    Musculoskeletal:         Right foot pain     Skin:  Positive for wound. Negative for color change and rash.       Objective:     BP (!) 142/80   Pulse 100   Wt 73.9 kg (163 lb)   SpO2 95%   BMI 28.87 kg/m²   Physical Exam  Vitals and nursing note reviewed.   Constitutional:       Appearance: Normal appearance.   Cardiovascular:      Rate and Rhythm: Normal rate and regular rhythm.      Pulses:           Dorsalis pedis pulses are 0 on the right side.      Heart sounds: Normal heart sounds.   Pulmonary:      Effort: Pulmonary effort is normal.      Breath sounds: No wheezing, rhonchi or rales.   Musculoskeletal:      Right foot: Deformity and bunion present.   Feet:      Right foot:      Skin integrity: Skin breakdown and erythema present. No warmth.   Neurological:      Mental Status: She is alert.         Assessment/Plan:   1. Local skin infection  2. Foot deformity, acquired, right  3. Peripheral vascular disease   Reviewed podiatry note  Recheck BP  Soaks   Mole skin to avoid rubbing  May need a wider toe box or custom shoe.    Find Dr. Chin note--was sent over and place on my desk,  Document temp and take picture.   Return if symptoms worsen or fail to improve, for AWV with Dr. Estrella.    Electronically signed by Kristie Luke 3/4/2025 at 10:35 AM

## 2025-03-14 ENCOUNTER — TELEPHONE (OUTPATIENT)
Dept: PRIMARY CARE CLINIC | Age: 89
End: 2025-03-14

## 2025-03-14 RX ORDER — SULFAMETHOXAZOLE AND TRIMETHOPRIM 800; 160 MG/1; MG/1
1 TABLET ORAL 2 TIMES DAILY
Qty: 20 TABLET | Refills: 0 | Status: SHIPPED | OUTPATIENT
Start: 2025-03-14 | End: 2025-03-17 | Stop reason: ALTCHOICE

## 2025-03-14 NOTE — TELEPHONE ENCOUNTER
Patient is just concerned. She states her right foot has much improved since taking the antibiotics. However, she's just worried it isn't completely healed. She is scheduled for Monday but she is wondering if she should have a refill of antibiotics sent in until her appointment.    Please advise. Pharmacy verified.

## 2025-03-17 ENCOUNTER — HOSPITAL ENCOUNTER (OUTPATIENT)
Dept: GENERAL RADIOLOGY | Facility: CLINIC | Age: 89
Discharge: HOME | End: 2025-03-19
Payer: MEDICARE

## 2025-03-17 ENCOUNTER — OFFICE VISIT (OUTPATIENT)
Dept: PRIMARY CARE CLINIC | Age: 89
End: 2025-03-17

## 2025-03-17 ENCOUNTER — HOSPITAL ENCOUNTER (OUTPATIENT)
Facility: CLINIC | Age: 89
Discharge: HOME | End: 2025-03-19
Payer: MEDICARE

## 2025-03-17 VITALS
SYSTOLIC BLOOD PRESSURE: 140 MMHG | BODY MASS INDEX: 28.67 KG/M2 | WEIGHT: 161.8 LBS | DIASTOLIC BLOOD PRESSURE: 72 MMHG | OXYGEN SATURATION: 99 % | HEART RATE: 102 BPM | HEIGHT: 63 IN

## 2025-03-17 DIAGNOSIS — L98.9 LESION OF SKIN OF FOOT: Primary | ICD-10-CM

## 2025-03-17 DIAGNOSIS — D64.9 ANEMIA, UNSPECIFIED TYPE: ICD-10-CM

## 2025-03-17 DIAGNOSIS — L98.9 LESION OF SKIN OF FOOT: ICD-10-CM

## 2025-03-17 PROCEDURE — 73620 X-RAY EXAM OF FOOT: CPT

## 2025-03-17 RX ORDER — DOXYCYCLINE 100 MG/1
100 CAPSULE ORAL 2 TIMES DAILY
Qty: 20 CAPSULE | Refills: 0 | Status: SHIPPED | OUTPATIENT
Start: 2025-03-17 | End: 2025-03-27

## 2025-03-17 NOTE — PROGRESS NOTES
[facility]  [facility] PRIMARY CARE  [address]  [place] [address]  Dept: [phone]    Amina Woods is a 89 y.o. female Established patient, who presents today for her medical conditions/complaints as noted below.      Chief Complaint   Patient presents with    Foot Concern     Patient states right side foot has been very painful for the past couple of months and wants it checked for infection.       HPI:     History of Present Illness  The patient presents for evaluation of right foot pain, slurred speech, and iron deficiency.    She has been experiencing persistent pain in her right foot, which was initially evaluated by a podiatrist. Despite the podiatrist's dismissal of her concerns, she sought further consultation from Dr. Chin, who recommended immediate medical attention due to the alarming appearance of the foot. She then consulted with Adriana Rodriguez PA-C who prescribed an oral antibiotic and topical antibiotic cream. She completed the course of antibiotics last Friday. The pain has since subsided, but she continues to experience discomfort when wearing shoes. She reports a red, swollen bump on her foot, which was present during her visit to the podiatrist. The podiatrist identified a small corn and performed a removal procedure, after which her symptoms began. She has not undergone any radiographic imaging of her feet. She has attempted to alleviate the discomfort by wearing sandals at home but is hesitant to wear them outside due to fear of falling. She experienced side effects from Bactrim, including headaches, drowsiness, and fatigue. She has no noted history of side effects from doxycycline.    She has experienced two episodes of slurred speech, each lasting a few seconds. She occasionally experiences dry mouth but does not report any associated drooling. She suspects that she may snore at night but does not wake up due to it. She occasionally wakes up with a

## 2025-03-19 ENCOUNTER — RESULTS FOLLOW-UP (OUTPATIENT)
Dept: GENERAL RADIOLOGY | Facility: CLINIC | Age: 89
End: 2025-03-19

## 2025-03-19 NOTE — RESULT ENCOUNTER NOTE
Adv pt xray shows a lot of arthritis in her foot and the joint at the 5th toe is pushed crooked- which might be the cause of the bump.  Would recommend f/u with podiatry.

## 2025-04-18 ENCOUNTER — TELEPHONE (OUTPATIENT)
Dept: PRIMARY CARE CLINIC | Age: 89
End: 2025-04-18

## 2025-04-18 DIAGNOSIS — Z12.31 ENCOUNTER FOR SCREENING MAMMOGRAM FOR BREAST CANCER: Primary | ICD-10-CM

## 2025-04-18 NOTE — TELEPHONE ENCOUNTER
[facility] Scheduling  is requesting an order for a mammogram screen.    Orders should be sent to [facility] at [phone].    Orders created and faxed.

## 2025-04-28 ENCOUNTER — OFFICE VISIT (OUTPATIENT)
Dept: PODIATRY | Age: 89
End: 2025-04-28
Payer: MEDICARE

## 2025-04-28 VITALS — BODY MASS INDEX: 28.53 KG/M2 | HEIGHT: 63 IN | WEIGHT: 161 LBS

## 2025-04-28 DIAGNOSIS — I73.9 PVD (PERIPHERAL VASCULAR DISEASE): ICD-10-CM

## 2025-04-28 DIAGNOSIS — M21.622 TAILOR'S BUNION OF BOTH FEET: ICD-10-CM

## 2025-04-28 DIAGNOSIS — B35.1 DERMATOPHYTOSIS OF NAIL: ICD-10-CM

## 2025-04-28 DIAGNOSIS — M79.604 BILATERAL LOWER EXTREMITY PAIN: Primary | ICD-10-CM

## 2025-04-28 DIAGNOSIS — M21.621 TAILOR'S BUNION OF BOTH FEET: ICD-10-CM

## 2025-04-28 DIAGNOSIS — M19.071 DEGENERATIVE JOINT DISEASE OF BOTH ANKLES AND FEET: ICD-10-CM

## 2025-04-28 DIAGNOSIS — M19.072 DEGENERATIVE JOINT DISEASE OF BOTH ANKLES AND FEET: ICD-10-CM

## 2025-04-28 DIAGNOSIS — M79.605 BILATERAL LOWER EXTREMITY PAIN: Primary | ICD-10-CM

## 2025-04-28 PROCEDURE — 1126F AMNT PAIN NOTED NONE PRSNT: CPT | Performed by: PODIATRIST

## 2025-04-28 PROCEDURE — 1159F MED LIST DOCD IN RCRD: CPT | Performed by: PODIATRIST

## 2025-04-28 PROCEDURE — 99213 OFFICE O/P EST LOW 20 MIN: CPT | Performed by: PODIATRIST

## 2025-04-28 PROCEDURE — 11721 DEBRIDE NAIL 6 OR MORE: CPT | Performed by: PODIATRIST

## 2025-04-28 PROCEDURE — 1123F ACP DISCUSS/DSCN MKR DOCD: CPT | Performed by: PODIATRIST

## 2025-04-28 NOTE — PROGRESS NOTES
Prolonged emergence from general anesthesia     Psoriasis 08/30/2011    Syncope, non cardiac     \"vascular syncope\" per patient - last episode was 2023       Allergies   Allergen Reactions    Latex Rash    Adhesive Tape Other (See Comments)     Tears her skin    Anesthetic [Benzocaine]      Difficulty waking    Aspercreme Lidocaine [Lidocaine]     Benzocaine-Benzethonium      Difficulty waking    Pcn [Penicillins] Rash     Other reaction(s): Intolerance     Current Outpatient Medications on File Prior to Visit   Medication Sig Dispense Refill    mupirocin (BACTROBAN) 2 % ointment Apply topically 3 times daily. 22 g 0    diclofenac (VOLTAREN) 75 MG EC tablet TAKE 1 TABLET TWICE A  tablet 2    omeprazole (PRILOSEC) 20 MG delayed release capsule TAKE 1 CAPSULE DAILY 90 capsule 2    cyanocobalamin 1000 MCG tablet Take 1 tablet by mouth daily 90 tablet 3    ferrous sulfate (IRON 325) 325 (65 Fe) MG tablet Take 1 tablet by mouth in the morning and at bedtime 60 tablet 5    prednisoLONE acetate (PRED FORTE) 1 % ophthalmic suspension Place 1 drop into both eyes      aspirin 81 MG EC tablet Take 81mg twice daily through 9/9/24, then decrease to 81mg every other day (previous home dose). 30 tablet 1    acetaminophen (TYLENOL) 500 MG tablet Take 650 mg by mouth every 6 hours as needed for Pain      vitamin D (CHOLECALCIFEROL) 1000 UNIT TABS tablet Take 3 tablets by mouth daily       No current facility-administered medications on file prior to visit.     Review of Systems.    Review of Systems:   History obtained from chart review and the patient  General ROS: negative for - chills, fatigue, fever, night sweats or weight gain  Constitutional: Negative for chills, diaphoresis, fatigue, fever and unexpected weight change.  Musculoskeletal: Positive for arthralgias, gait problem and joint swelling.  Neurological ROS: negative for - behavioral changes, confusion, headaches or seizures. Negative for weakness and numbness.

## 2025-05-28 ENCOUNTER — HOSPITAL ENCOUNTER (INPATIENT)
Age: 89
LOS: 2 days | Discharge: HOME | DRG: 392 | End: 2025-05-30
Attending: EMERGENCY MEDICINE
Payer: MEDICARE

## 2025-05-28 ENCOUNTER — APPOINTMENT (OUTPATIENT)
Dept: CT IMAGING | Age: 89
DRG: 392 | End: 2025-05-28
Payer: MEDICARE

## 2025-05-28 DIAGNOSIS — K86.89 PANCREATIC DUCT DILATED: ICD-10-CM

## 2025-05-28 DIAGNOSIS — K44.9 HIATAL HERNIA: ICD-10-CM

## 2025-05-28 DIAGNOSIS — K40.90 NON-RECURRENT UNILATERAL INGUINAL HERNIA WITHOUT OBSTRUCTION OR GANGRENE: ICD-10-CM

## 2025-05-28 DIAGNOSIS — K52.89 STERCORAL COLITIS: Primary | ICD-10-CM

## 2025-05-28 PROBLEM — R10.9 ABDOMINAL PAIN: Status: ACTIVE | Noted: 2025-05-28

## 2025-05-28 LAB
ALBUMIN SERPL-MCNC: 4.2 G/DL (ref 3.5–5.2)
ALP SERPL-CCNC: 121 U/L (ref 35–104)
ALT SERPL-CCNC: 9 U/L (ref 10–35)
ANION GAP SERPL CALCULATED.3IONS-SCNC: 11 MMOL/L (ref 9–16)
AST SERPL-CCNC: 20 U/L (ref 10–35)
BASOPHILS # BLD: <0.03 K/UL (ref 0–0.2)
BASOPHILS NFR BLD: 0 % (ref 0–2)
BILIRUB SERPL-MCNC: <0.2 MG/DL (ref 0–1.2)
BILIRUB UR QL STRIP: NEGATIVE
BUN SERPL-MCNC: 13 MG/DL (ref 8–23)
CALCIUM SERPL-MCNC: 9.2 MG/DL (ref 8.6–10.4)
CHLORIDE SERPL-SCNC: 101 MMOL/L (ref 98–107)
CLARITY UR: CLEAR
CO2 SERPL-SCNC: 26 MMOL/L (ref 20–31)
COLOR UR: YELLOW
COMMENT: NORMAL
CREAT SERPL-MCNC: 0.9 MG/DL (ref 0.7–1.2)
EOSINOPHIL # BLD: 0.12 K/UL (ref 0–0.44)
EOSINOPHILS RELATIVE PERCENT: 2 % (ref 0–4)
ERYTHROCYTE [DISTWIDTH] IN BLOOD BY AUTOMATED COUNT: 12.8 % (ref 11.5–14.9)
GFR, ESTIMATED: 61 ML/MIN/1.73M2
GLUCOSE SERPL-MCNC: 107 MG/DL (ref 74–99)
GLUCOSE UR STRIP-MCNC: NEGATIVE MG/DL
HCT VFR BLD AUTO: 40.4 % (ref 36–46)
HGB BLD-MCNC: 13.5 G/DL (ref 12–16)
HGB UR QL STRIP.AUTO: NEGATIVE
IMM GRANULOCYTES # BLD AUTO: <0.03 K/UL (ref 0–0.3)
IMM GRANULOCYTES NFR BLD: 0 %
KETONES UR STRIP-MCNC: NEGATIVE MG/DL
LACTATE BLDV-SCNC: 0.9 MMOL/L (ref 0.5–2.2)
LEUKOCYTE ESTERASE UR QL STRIP: NEGATIVE
LIPASE SERPL-CCNC: 38 U/L (ref 13–60)
LYMPHOCYTES NFR BLD: 1.28 K/UL (ref 1.1–3.7)
LYMPHOCYTES RELATIVE PERCENT: 19 % (ref 24–44)
MCH RBC QN AUTO: 30.4 PG (ref 26–34)
MCHC RBC AUTO-ENTMCNC: 33.4 G/DL (ref 31–37)
MCV RBC AUTO: 91 FL (ref 80–100)
MONOCYTES NFR BLD: 0.51 K/UL (ref 0.1–1.2)
MONOCYTES NFR BLD: 8 % (ref 3–12)
NEUTROPHILS NFR BLD: 71 % (ref 36–66)
NEUTS SEG NFR BLD: 4.78 K/UL (ref 1.5–8.1)
NITRITE UR QL STRIP: NEGATIVE
NRBC BLD-RTO: 0 PER 100 WBC
PH UR STRIP: 7.5 [PH] (ref 5–8)
PLATELET # BLD AUTO: 269 K/UL (ref 150–450)
PMV BLD AUTO: 8.9 FL (ref 8–13.5)
POTASSIUM SERPL-SCNC: 4.6 MMOL/L (ref 3.7–5.3)
PROT SERPL-MCNC: 6.9 G/DL (ref 6.6–8.7)
PROT UR STRIP-MCNC: NEGATIVE MG/DL
RBC # BLD AUTO: 4.44 M/UL (ref 3.95–5.11)
SODIUM SERPL-SCNC: 138 MMOL/L (ref 136–145)
SP GR UR STRIP: 1.01 (ref 1–1.03)
UROBILINOGEN UR STRIP-ACNC: NORMAL EU/DL (ref 0–1)
WBC OTHER # BLD: 6.7 K/UL (ref 3.5–11)

## 2025-05-28 PROCEDURE — 36415 COLL VENOUS BLD VENIPUNCTURE: CPT

## 2025-05-28 PROCEDURE — 2580000003 HC RX 258: Performed by: EMERGENCY MEDICINE

## 2025-05-28 PROCEDURE — 83605 ASSAY OF LACTIC ACID: CPT

## 2025-05-28 PROCEDURE — 99285 EMERGENCY DEPT VISIT HI MDM: CPT

## 2025-05-28 PROCEDURE — 6360000002 HC RX W HCPCS

## 2025-05-28 PROCEDURE — 85025 COMPLETE CBC W/AUTO DIFF WBC: CPT

## 2025-05-28 PROCEDURE — 6360000004 HC RX CONTRAST MEDICATION: Performed by: EMERGENCY MEDICINE

## 2025-05-28 PROCEDURE — 6370000000 HC RX 637 (ALT 250 FOR IP): Performed by: SURGERY

## 2025-05-28 PROCEDURE — 1200000000 HC SEMI PRIVATE

## 2025-05-28 PROCEDURE — 74177 CT ABD & PELVIS W/CONTRAST: CPT

## 2025-05-28 PROCEDURE — 2580000003 HC RX 258: Performed by: SURGERY

## 2025-05-28 PROCEDURE — 2500000003 HC RX 250 WO HCPCS: Performed by: EMERGENCY MEDICINE

## 2025-05-28 PROCEDURE — 83690 ASSAY OF LIPASE: CPT

## 2025-05-28 PROCEDURE — 81003 URINALYSIS AUTO W/O SCOPE: CPT

## 2025-05-28 PROCEDURE — 80053 COMPREHEN METABOLIC PANEL: CPT

## 2025-05-28 RX ORDER — HYDRALAZINE HYDROCHLORIDE 20 MG/ML
5 INJECTION INTRAMUSCULAR; INTRAVENOUS EVERY 6 HOURS PRN
Status: DISCONTINUED | OUTPATIENT
Start: 2025-05-28 | End: 2025-05-30 | Stop reason: HOSPADM

## 2025-05-28 RX ORDER — MAGNESIUM SULFATE HEPTAHYDRATE 40 MG/ML
2000 INJECTION, SOLUTION INTRAVENOUS PRN
Status: DISCONTINUED | OUTPATIENT
Start: 2025-05-28 | End: 2025-05-30 | Stop reason: HOSPADM

## 2025-05-28 RX ORDER — ASPIRIN 81 MG/1
81 TABLET ORAL DAILY
Status: DISCONTINUED | OUTPATIENT
Start: 2025-05-29 | End: 2025-05-30 | Stop reason: HOSPADM

## 2025-05-28 RX ORDER — SODIUM CHLORIDE 0.9 % (FLUSH) 0.9 %
5-40 SYRINGE (ML) INJECTION PRN
Status: DISCONTINUED | OUTPATIENT
Start: 2025-05-28 | End: 2025-05-30 | Stop reason: HOSPADM

## 2025-05-28 RX ORDER — POTASSIUM CHLORIDE 1500 MG/1
40 TABLET, EXTENDED RELEASE ORAL PRN
Status: DISCONTINUED | OUTPATIENT
Start: 2025-05-28 | End: 2025-05-30 | Stop reason: HOSPADM

## 2025-05-28 RX ORDER — SODIUM CHLORIDE 9 MG/ML
INJECTION, SOLUTION INTRAVENOUS PRN
Status: DISCONTINUED | OUTPATIENT
Start: 2025-05-28 | End: 2025-05-30 | Stop reason: HOSPADM

## 2025-05-28 RX ORDER — ENOXAPARIN SODIUM 100 MG/ML
40 INJECTION SUBCUTANEOUS DAILY
Status: DISCONTINUED | OUTPATIENT
Start: 2025-05-29 | End: 2025-05-30 | Stop reason: HOSPADM

## 2025-05-28 RX ORDER — SODIUM CHLORIDE 0.9 % (FLUSH) 0.9 %
10 SYRINGE (ML) INJECTION PRN
Status: DISCONTINUED | OUTPATIENT
Start: 2025-05-28 | End: 2025-05-30 | Stop reason: HOSPADM

## 2025-05-28 RX ORDER — POTASSIUM CHLORIDE 7.45 MG/ML
10 INJECTION INTRAVENOUS PRN
Status: DISCONTINUED | OUTPATIENT
Start: 2025-05-28 | End: 2025-05-30 | Stop reason: HOSPADM

## 2025-05-28 RX ORDER — ACETAMINOPHEN 325 MG/1
650 TABLET ORAL EVERY 4 HOURS PRN
Status: DISCONTINUED | OUTPATIENT
Start: 2025-05-28 | End: 2025-05-30 | Stop reason: HOSPADM

## 2025-05-28 RX ORDER — ACETAMINOPHEN 325 MG/1
650 TABLET ORAL EVERY 6 HOURS PRN
Status: DISCONTINUED | OUTPATIENT
Start: 2025-05-28 | End: 2025-05-30 | Stop reason: HOSPADM

## 2025-05-28 RX ORDER — BISACODYL 10 MG
10 SUPPOSITORY, RECTAL RECTAL DAILY PRN
Status: DISCONTINUED | OUTPATIENT
Start: 2025-05-28 | End: 2025-05-30 | Stop reason: HOSPADM

## 2025-05-28 RX ORDER — IOPAMIDOL 755 MG/ML
75 INJECTION, SOLUTION INTRAVASCULAR
Status: COMPLETED | OUTPATIENT
Start: 2025-05-28 | End: 2025-05-28

## 2025-05-28 RX ORDER — SODIUM CHLORIDE 9 MG/ML
INJECTION, SOLUTION INTRAVENOUS CONTINUOUS
Status: DISCONTINUED | OUTPATIENT
Start: 2025-05-28 | End: 2025-05-28

## 2025-05-28 RX ORDER — ACETAMINOPHEN 650 MG/1
650 SUPPOSITORY RECTAL EVERY 6 HOURS PRN
Status: DISCONTINUED | OUTPATIENT
Start: 2025-05-28 | End: 2025-05-30 | Stop reason: HOSPADM

## 2025-05-28 RX ORDER — SODIUM CHLORIDE 9 MG/ML
INJECTION, SOLUTION INTRAVENOUS CONTINUOUS
Status: ACTIVE | OUTPATIENT
Start: 2025-05-28 | End: 2025-05-29

## 2025-05-28 RX ORDER — MIDODRINE HYDROCHLORIDE 2.5 MG/1
2.5 TABLET ORAL 3 TIMES DAILY PRN
COMMUNITY

## 2025-05-28 RX ORDER — ONDANSETRON 2 MG/ML
4 INJECTION INTRAMUSCULAR; INTRAVENOUS EVERY 6 HOURS PRN
Status: DISCONTINUED | OUTPATIENT
Start: 2025-05-28 | End: 2025-05-28 | Stop reason: SDUPTHER

## 2025-05-28 RX ORDER — SENNA AND DOCUSATE SODIUM 50; 8.6 MG/1; MG/1
2 TABLET, FILM COATED ORAL DAILY
Status: DISCONTINUED | OUTPATIENT
Start: 2025-05-29 | End: 2025-05-30 | Stop reason: HOSPADM

## 2025-05-28 RX ORDER — 0.9 % SODIUM CHLORIDE 0.9 %
100 INTRAVENOUS SOLUTION INTRAVENOUS ONCE
Status: COMPLETED | OUTPATIENT
Start: 2025-05-28 | End: 2025-05-28

## 2025-05-28 RX ORDER — ONDANSETRON 2 MG/ML
4 INJECTION INTRAMUSCULAR; INTRAVENOUS EVERY 6 HOURS PRN
Status: DISCONTINUED | OUTPATIENT
Start: 2025-05-28 | End: 2025-05-30 | Stop reason: HOSPADM

## 2025-05-28 RX ORDER — PANTOPRAZOLE SODIUM 40 MG/1
40 TABLET, DELAYED RELEASE ORAL
Status: DISCONTINUED | OUTPATIENT
Start: 2025-05-29 | End: 2025-05-30 | Stop reason: HOSPADM

## 2025-05-28 RX ORDER — ONDANSETRON 4 MG/1
4 TABLET, ORALLY DISINTEGRATING ORAL EVERY 8 HOURS PRN
Status: DISCONTINUED | OUTPATIENT
Start: 2025-05-28 | End: 2025-05-30 | Stop reason: HOSPADM

## 2025-05-28 RX ORDER — FERROUS SULFATE 325(65) MG
325 TABLET ORAL 2 TIMES DAILY
Status: DISCONTINUED | OUTPATIENT
Start: 2025-05-28 | End: 2025-05-30 | Stop reason: HOSPADM

## 2025-05-28 RX ADMIN — IOPAMIDOL 75 ML: 755 INJECTION, SOLUTION INTRAVENOUS at 17:23

## 2025-05-28 RX ADMIN — ACETAMINOPHEN 650 MG: 325 TABLET ORAL at 21:46

## 2025-05-28 RX ADMIN — SODIUM CHLORIDE: 0.9 INJECTION, SOLUTION INTRAVENOUS at 23:15

## 2025-05-28 RX ADMIN — SODIUM CHLORIDE, PRESERVATIVE FREE 10 ML: 5 INJECTION INTRAVENOUS at 17:23

## 2025-05-28 RX ADMIN — HYDRALAZINE HYDROCHLORIDE 5 MG: 20 INJECTION INTRAMUSCULAR; INTRAVENOUS at 23:19

## 2025-05-28 RX ADMIN — SODIUM CHLORIDE 100 ML: 9 INJECTION, SOLUTION INTRAVENOUS at 17:24

## 2025-05-28 ASSESSMENT — PAIN - FUNCTIONAL ASSESSMENT
PAIN_FUNCTIONAL_ASSESSMENT: 0-10
PAIN_FUNCTIONAL_ASSESSMENT: ACTIVITIES ARE NOT PREVENTED

## 2025-05-28 ASSESSMENT — PAIN SCALES - GENERAL
PAINLEVEL_OUTOF10: 4
PAINLEVEL_OUTOF10: 5

## 2025-05-28 ASSESSMENT — PAIN DESCRIPTION - ORIENTATION: ORIENTATION: MID;LOWER

## 2025-05-28 ASSESSMENT — PAIN DESCRIPTION - LOCATION: LOCATION: ABDOMEN

## 2025-05-28 ASSESSMENT — PAIN DESCRIPTION - DESCRIPTORS: DESCRIPTORS: THROBBING

## 2025-05-28 NOTE — ED PROVIDER NOTES
[facility] EMERGENCY DEPARTMENT  EMERGENCY DEPARTMENT ENCOUNTER      Pt Name: Amina Woods  MRN: [id number]  Birthdate 7/9/1935  Date of evaluation: 5/28/25      CHIEF COMPLAINT       Chief Complaint   Patient presents with    Abdominal Pain     HISTORY OF PRESENT ILLNESS   HPI 89 y.o. female presents with c/o abdominal pain.  Patient reports severe left lower quadrant abdominal pain.  Symptoms started around 630 this morning.  She says that she had gone and had a small bowel movement and then after that developed fairly severe pain across her lower abdomen.  Pain is primarily on the left side but radiates all the way across.  The pain is not as severe as it was earlier in the morning but it persist and she is rating it moderate in severity at this time.  She reports associated nausea no vomiting, not sure if she has passed gas, no dysuria no hematuria.  No lightheadedness or dizziness.  No pain in her extremities..     REVIEW OF SYSTEMS       Review of Systems  10 systems reviewed and negative unless otherwise noted in the HPI.     PAST MEDICAL HISTORY     Past Medical History:   Diagnosis Date    Basal cell carcinoma     mulitple    Blood in feces 09/19/2016    Hyperlipidemia 08/30/2011    Mild hyperglycemia. 08/30/2011    Orthostatic hypotension 08/30/2011    Osteoarthritis     knees, thumbs    Post-menopausal     Pre-cancerous skin lesions. 08/30/2011    Prolonged emergence from general anesthesia     Psoriasis 08/30/2011    Syncope, non cardiac     \"vascular syncope\" per patient - last episode was 2023       SURGICAL HISTORY       Past Surgical History:   Procedure Laterality Date    APPENDECTOMY      during hyst    CATARACT REMOVAL WITH IMPLANT Bilateral     COLON SURGERY      fistula repair- pt not sure    COLONOSCOPY  1997, 2002    FOOT SURGERY      [place]    HYSTERECTOMY (CERVIX STATUS UNKNOWN)      complete, d/t fibroids    JOINT REPLACEMENT  2001    Right and left knees    SIGMOIDOSCOPY  2016

## 2025-05-29 ENCOUNTER — APPOINTMENT (OUTPATIENT)
Dept: MRI IMAGING | Age: 89
DRG: 392 | End: 2025-05-29
Payer: MEDICARE

## 2025-05-29 ENCOUNTER — APPOINTMENT (OUTPATIENT)
Dept: GENERAL RADIOLOGY | Age: 89
DRG: 392 | End: 2025-05-29
Payer: MEDICARE

## 2025-05-29 PROBLEM — K40.90 NON-RECURRENT UNILATERAL INGUINAL HERNIA WITHOUT OBSTRUCTION OR GANGRENE: Status: ACTIVE | Noted: 2025-05-29

## 2025-05-29 PROBLEM — K52.89 STERCORAL COLITIS: Status: ACTIVE | Noted: 2025-05-29

## 2025-05-29 PROBLEM — K86.89 PANCREATIC DUCT DILATED: Status: ACTIVE | Noted: 2025-05-29

## 2025-05-29 PROBLEM — K44.9 HIATAL HERNIA: Status: ACTIVE | Noted: 2025-05-29

## 2025-05-29 LAB
ALBUMIN SERPL-MCNC: 3.6 G/DL (ref 3.5–5.2)
ALP SERPL-CCNC: 98 U/L (ref 35–104)
ALT SERPL-CCNC: 7 U/L (ref 10–35)
ANION GAP SERPL CALCULATED.3IONS-SCNC: 8 MMOL/L (ref 9–16)
AST SERPL-CCNC: 16 U/L (ref 10–35)
BASOPHILS # BLD: <0.03 K/UL (ref 0–0.2)
BASOPHILS NFR BLD: 0 % (ref 0–2)
BILIRUB SERPL-MCNC: 0.3 MG/DL (ref 0–1.2)
BUN SERPL-MCNC: 11 MG/DL (ref 8–23)
CALCIUM SERPL-MCNC: 8.7 MG/DL (ref 8.6–10.4)
CHLORIDE SERPL-SCNC: 103 MMOL/L (ref 98–107)
CO2 SERPL-SCNC: 26 MMOL/L (ref 20–31)
CREAT SERPL-MCNC: 0.8 MG/DL (ref 0.7–1.2)
EOSINOPHIL # BLD: 0.16 K/UL (ref 0–0.44)
EOSINOPHILS RELATIVE PERCENT: 3 % (ref 0–4)
ERYTHROCYTE [DISTWIDTH] IN BLOOD BY AUTOMATED COUNT: 12.8 % (ref 11.5–14.9)
GFR, ESTIMATED: 70 ML/MIN/1.73M2
GLUCOSE SERPL-MCNC: 103 MG/DL (ref 74–99)
HCT VFR BLD AUTO: 35.6 % (ref 36–46)
HGB BLD-MCNC: 11.9 G/DL (ref 12–16)
IMM GRANULOCYTES # BLD AUTO: <0.03 K/UL (ref 0–0.3)
IMM GRANULOCYTES NFR BLD: 0 %
LYMPHOCYTES NFR BLD: 1.22 K/UL (ref 1.1–3.7)
LYMPHOCYTES RELATIVE PERCENT: 22 % (ref 24–44)
MCH RBC QN AUTO: 30.1 PG (ref 26–34)
MCHC RBC AUTO-ENTMCNC: 33.4 G/DL (ref 31–37)
MCV RBC AUTO: 89.9 FL (ref 80–100)
MONOCYTES NFR BLD: 0.56 K/UL (ref 0.1–1.2)
MONOCYTES NFR BLD: 10 % (ref 3–12)
NEUTROPHILS NFR BLD: 65 % (ref 36–66)
NEUTS SEG NFR BLD: 3.5 K/UL (ref 1.5–8.1)
NRBC BLD-RTO: 0 PER 100 WBC
PLATELET # BLD AUTO: 241 K/UL (ref 150–450)
PMV BLD AUTO: 9.1 FL (ref 8–13.5)
POTASSIUM SERPL-SCNC: 4.2 MMOL/L (ref 3.7–5.3)
PROT SERPL-MCNC: 5.6 G/DL (ref 6.6–8.7)
RBC # BLD AUTO: 3.96 M/UL (ref 3.95–5.11)
SODIUM SERPL-SCNC: 137 MMOL/L (ref 136–145)
WBC OTHER # BLD: 5.5 K/UL (ref 3.5–11)

## 2025-05-29 PROCEDURE — 74246 X-RAY XM UPR GI TRC 2CNTRST: CPT

## 2025-05-29 PROCEDURE — 6370000000 HC RX 637 (ALT 250 FOR IP): Performed by: SURGERY

## 2025-05-29 PROCEDURE — 36415 COLL VENOUS BLD VENIPUNCTURE: CPT

## 2025-05-29 PROCEDURE — 2500000003 HC RX 250 WO HCPCS: Performed by: SURGERY

## 2025-05-29 PROCEDURE — 6360000002 HC RX W HCPCS

## 2025-05-29 PROCEDURE — 2580000003 HC RX 258: Performed by: SURGERY

## 2025-05-29 PROCEDURE — 80053 COMPREHEN METABOLIC PANEL: CPT

## 2025-05-29 PROCEDURE — 6370000000 HC RX 637 (ALT 250 FOR IP)

## 2025-05-29 PROCEDURE — 99223 1ST HOSP IP/OBS HIGH 75: CPT | Performed by: SURGERY

## 2025-05-29 PROCEDURE — 99223 1ST HOSP IP/OBS HIGH 75: CPT

## 2025-05-29 PROCEDURE — 2580000003 HC RX 258

## 2025-05-29 PROCEDURE — 76377 3D RENDER W/INTRP POSTPROCES: CPT

## 2025-05-29 PROCEDURE — 2580000003 HC RX 258: Performed by: NURSE PRACTITIONER

## 2025-05-29 PROCEDURE — A9579 GAD-BASE MR CONTRAST NOS,1ML: HCPCS | Performed by: NURSE PRACTITIONER

## 2025-05-29 PROCEDURE — 2500000003 HC RX 250 WO HCPCS: Performed by: NURSE PRACTITIONER

## 2025-05-29 PROCEDURE — 6360000002 HC RX W HCPCS: Performed by: SURGERY

## 2025-05-29 PROCEDURE — 1200000000 HC SEMI PRIVATE

## 2025-05-29 PROCEDURE — 6360000004 HC RX CONTRAST MEDICATION: Performed by: NURSE PRACTITIONER

## 2025-05-29 PROCEDURE — 85025 COMPLETE CBC W/AUTO DIFF WBC: CPT

## 2025-05-29 RX ORDER — POLYETHYLENE GLYCOL 3350 17 G/17G
17 POWDER, FOR SOLUTION ORAL 2 TIMES DAILY
Status: DISCONTINUED | OUTPATIENT
Start: 2025-05-29 | End: 2025-05-30 | Stop reason: HOSPADM

## 2025-05-29 RX ORDER — SODIUM CHLORIDE 0.9 % (FLUSH) 0.9 %
10 SYRINGE (ML) INJECTION PRN
Status: DISCONTINUED | OUTPATIENT
Start: 2025-05-29 | End: 2025-05-30 | Stop reason: HOSPADM

## 2025-05-29 RX ORDER — 0.9 % SODIUM CHLORIDE 0.9 %
50 INTRAVENOUS SOLUTION INTRAVENOUS ONCE
Status: COMPLETED | OUTPATIENT
Start: 2025-05-29 | End: 2025-05-29

## 2025-05-29 RX ORDER — DEXTROSE MONOHYDRATE 100 MG/ML
INJECTION, SOLUTION INTRAVENOUS CONTINUOUS PRN
Status: DISCONTINUED | OUTPATIENT
Start: 2025-05-29 | End: 2025-05-30 | Stop reason: HOSPADM

## 2025-05-29 RX ORDER — SODIUM CHLORIDE 9 MG/ML
INJECTION, SOLUTION INTRAVENOUS CONTINUOUS
Status: ACTIVE | OUTPATIENT
Start: 2025-05-29 | End: 2025-05-30

## 2025-05-29 RX ADMIN — SENNOSIDES AND DOCUSATE SODIUM 2 TABLET: 50; 8.6 TABLET ORAL at 09:03

## 2025-05-29 RX ADMIN — ENOXAPARIN SODIUM 40 MG: 100 INJECTION SUBCUTANEOUS at 09:02

## 2025-05-29 RX ADMIN — POLYETHYLENE GLYCOL 3350 17 G: 17 POWDER, FOR SOLUTION ORAL at 12:51

## 2025-05-29 RX ADMIN — BARIUM SULFATE 160 ML: 960 POWDER, FOR SUSPENSION ORAL at 11:58

## 2025-05-29 RX ADMIN — BARIUM SULFATE 140 ML: 980 POWDER, FOR SUSPENSION ORAL at 11:59

## 2025-05-29 RX ADMIN — FERROUS SULFATE TAB 325 MG (65 MG ELEMENTAL FE) 325 MG: 325 (65 FE) TAB at 09:03

## 2025-05-29 RX ADMIN — ACETAMINOPHEN 650 MG: 325 TABLET ORAL at 22:30

## 2025-05-29 RX ADMIN — GADOTERIDOL 15 ML: 279.3 INJECTION, SOLUTION INTRAVENOUS at 18:47

## 2025-05-29 RX ADMIN — ASPIRIN 81 MG: 81 TABLET, COATED ORAL at 09:02

## 2025-05-29 RX ADMIN — SODIUM CHLORIDE, PRESERVATIVE FREE 10 ML: 5 INJECTION INTRAVENOUS at 18:48

## 2025-05-29 RX ADMIN — SODIUM CHLORIDE, PRESERVATIVE FREE 40 MG: 5 INJECTION INTRAVENOUS at 09:02

## 2025-05-29 RX ADMIN — POLYETHYLENE GLYCOL 3350 17 G: 17 POWDER, FOR SOLUTION ORAL at 22:30

## 2025-05-29 RX ADMIN — SODIUM CHLORIDE: 0.9 INJECTION, SOLUTION INTRAVENOUS at 07:11

## 2025-05-29 RX ADMIN — SODIUM CHLORIDE 50 ML: 9 INJECTION, SOLUTION INTRAVENOUS at 18:48

## 2025-05-29 ASSESSMENT — ENCOUNTER SYMPTOMS
RHINORRHEA: 0
SHORTNESS OF BREATH: 0
SORE THROAT: 0
COUGH: 0

## 2025-05-29 ASSESSMENT — PAIN SCALES - GENERAL: PAINLEVEL_OUTOF10: 6

## 2025-05-29 NOTE — ED NOTES
Report given to JOSEPHINE St from [employer].   Report method by phone.   The following was reviewed with receiving RN:   Current vital signs:  BP (!) 173/57   Pulse 74   Temp 98 °F (36.7 °C) (Oral)   Resp 18   Ht 1.6 m (5' 3\")   Wt 72.6 kg (160 lb)   SpO2 96%   BMI 28.34 kg/m²                MEWS Score: 1     Any medication or safety alerts were reviewed. Any pending diagnostics and notifications were also reviewed, as well as any safety concerns or issues, abnormal labs, abnormal imaging, and abnormal assessment findings. Questions were answered.

## 2025-05-29 NOTE — PLAN OF CARE
Please have pt or POA fill out online MRI screening form in Epic. Pt needs to be in  hospital clothes for this exam and NPO for at least 4-6 hours. If there are any questions please call [phone].  Thanks!

## 2025-05-29 NOTE — CARE COORDINATION
Case Management Assessment  Initial Evaluation    Date/Time of Evaluation: 5/29/2025 12:08 PM  Assessment Completed by: Elva Meza RN    If patient is discharged prior to next notation, then this note serves as note for discharge by case management.    Patient Name: Amina Woods                   YOB: 1935  Diagnosis: Hiatal hernia [K44.9]  Abdominal pain [R10.9]  Pancreatic duct dilated [K86.89]  Non-recurrent unilateral inguinal hernia without obstruction or gangrene [K40.90]  Stercoral colitis [K52.89]                   Date / Time: 5/28/2025  2:35 PM    Patient Admission Status: Inpatient   Readmission Risk (Low < 19, Mod (19-27), High > 27): Readmission Risk Score: 12.7    Current PCP: Paradise Estrella MD  PCP verified by CM? Yes    Chart Reviewed: Yes      History Provided by: Patient, Medical Record  Patient Orientation: Alert and Oriented    Patient Cognition: Alert    Hospitalization in the last 30 days (Readmission):  No    If yes, Readmission Assessment in CM Navigator will be completed.    Advance Directives:      Code Status: Full Code   Patient's Primary Decision Maker is: (S) Legal Next of Kin (states she has Legal paperwork, requested paperwork)    Primary Decision Maker: Karolyn Woods - Child - [phone]    Secondary Decision Maker: WoodsCm urrutia - Child - [phone]    Secondary Decision Maker: Gurtzweiler,Gloria - Niece/Nephew - [phone]    Supplemental (Other) Decision Maker: LUPE SPRING - Child - [phone]    Discharge Planning:    Patient lives with: Alone Type of Home: House  Primary Care Giver: Self  Patient Support Systems include: Children, Family Members   Current Financial resources: Medicare  Current community resources: None  Current services prior to admission: Durable Medical Equipment            Current DME: Cane, Shower Chair, Walker            Type of Home Care services:  None    ADLS  Prior functional level: Independent in

## 2025-05-29 NOTE — ACP (ADVANCE CARE PLANNING)
Advance Care Planning     Advance Care Planning Activator (Inpatient)  Conversation Note      Date of ACP Conversation: 5/29/2025     Conversation Conducted with: Patient with Decision Making Capacity    ACP Activator: Elva Meza RN      Health Care Decision Maker:     Current Designated Health Care Decision Maker:     Primary Decision Maker: Karolyn Woods - Adán - [phone]    Secondary Decision Maker: Cm Woods - Child - [phone]    Secondary Decision Maker: Gurtzweiler,Gloria - Niece/Nephew - [phone]    Supplemental (Other) Decision Maker: LUPE SPRING - Child - [phone]    Today we discussed legal HCPOA. States we have paperwork. Requested copy. .    Care Preferences    Ventilation:  \"If you were in your present state of health and suddenly became very ill and were unable to breathe on your own, what would your preference be about the use of a ventilator (breathing machine) if it were available to you?\"      Would the patient desire the use of ventilator (breathing machine)?: yes    \"If your health worsens and it becomes clear that your chance of recovery is unlikely, what would your preference be about the use of a ventilator (breathing machine) if it were available to you?\"     Would the patient desire the use of ventilator (breathing machine)?: No      Resuscitation  \"CPR works best to restart the heart when there is a sudden event, like a heart attack, in someone who is otherwise healthy. Unfortunately, CPR does not typically restart the heart for people who have serious health conditions or who are very sick.\"    \"In the event your heart stopped as a result of an underlying serious health condition, would you want attempts to be made to restart your heart (answer \"yes\" for attempt to resuscitate) or would you prefer a natural death (answer \"no\" for do not attempt to resuscitate)?\" yes       [] Yes   [] No   Educated Patient / Decision Maker regarding differences between Advance

## 2025-05-29 NOTE — PLAN OF CARE
Problem: Discharge Planning  Goal: Discharge to home or other facility with appropriate resources  5/29/2025 1821 by Dinorah Hickman RN  Outcome: Progressing     Problem: ABCDS Injury Assessment  Goal: Absence of physical injury  5/29/2025 1821 by Dinorah Hickman RN  Outcome: Progressing     Problem: Pain  Goal: Verbalizes/displays adequate comfort level or baseline comfort level  Outcome: Progressing     Problem: Skin/Tissue Integrity  Goal: Skin integrity remains intact  Description: 1.  Monitor for areas of redness and/or skin breakdown2.  Assess vascular access sites hourly3.  Every 4-6 hours minimum:  Change oxygen saturation probe site4.  Every 4-6 hours:  If on nasal continuous positive airway pressure, respiratory therapy assess nares and determine need for appliance change or resting period  Outcome: Progressing     Problem: Nutrition Deficit:  Goal: Optimize nutritional status  Outcome: Progressing

## 2025-05-29 NOTE — PLAN OF CARE
Patient off unit for MRI  Will attempt to see later today.    Electronically signed by TISHA Franz NP on 5/29/2025 at 11:44 AM

## 2025-05-29 NOTE — H&P
opacities in the lungs.  Mild patchy opacities at the right lower lung, of uncertain acuity.  Consider chest radiograph correlation. 7. Question of a left adrenal myelolipoma or lipoma.       Assessment :      Hospital Problems           Last Modified POA    * (Principal) Abdominal pain 5/28/2025 Yes       Plan:     Patient status inpatient in the Med/Surge    89-year-old female presented with LLQ abdominal pain.  CT scan consistent with pancreatic ductal dilatation.  Pancreatic ductal dilatation.  GI consult.  Get MRCP done.  Large hiatal hernia with stomach herniated into the chest.  In general surgery consult, continue conservative management for now with patient's age.  Constipation with large amount of stool throughout the colon.  GlycoLax, stool softeners.  Active bowel send and passing bowel gas.  Patient complaining of dizziness when getting up, patient has not been eating well.  Will start patient on IV fluids.  Check orthostatics.  Left inguinal hernia.  Chronic anemia.  Continue iron tablets.  DVT prophylaxis.  Lovenox.  Addendum: Discussed with general surgery, patient has hiatal hernia will most likely need outpatient evaluation.  Inguinal hernia outpatient versus inpatient management.  Patient will be moderate risk for the proposed surgery for inguinal hernia.    Consultations:   IP CONSULT TO GENERAL SURGERY  IP CONSULT TO GI  IP CONSULT TO GI    Patient is admitted as inpatient status because of co-morbidities listed above, severity of signs and symptoms as outlined, requirement for current medical therapies and most importantly because of direct risk to patient if care not provided in a hospital setting.  Expected length of stay > 48 hours.    Amelia Tilley MD  5/29/2025  7:58 AM    Copy sent to Paradise Welsh MD    Please note that this chart was generated using voice recognition Dragon dictation software.  Although every effort was made to ensure the accuracy of this automated

## 2025-05-29 NOTE — ED NOTES
Patient told nurse that she took OTC Prilosec that she had in her purse.  Protonix that was ordered by the floor physician was not given.

## 2025-05-30 ENCOUNTER — TELEPHONE (OUTPATIENT)
Age: 89
End: 2025-05-30

## 2025-05-30 VITALS
WEIGHT: 161.82 LBS | BODY MASS INDEX: 28.67 KG/M2 | HEART RATE: 70 BPM | DIASTOLIC BLOOD PRESSURE: 69 MMHG | TEMPERATURE: 97.3 F | RESPIRATION RATE: 15 BRPM | HEIGHT: 63 IN | SYSTOLIC BLOOD PRESSURE: 152 MMHG | OXYGEN SATURATION: 95 %

## 2025-05-30 LAB
ALBUMIN SERPL-MCNC: 3.7 G/DL (ref 3.5–5.2)
ALP SERPL-CCNC: 100 U/L (ref 35–104)
ALT SERPL-CCNC: 8 U/L (ref 10–35)
ANION GAP SERPL CALCULATED.3IONS-SCNC: 8 MMOL/L (ref 9–16)
AST SERPL-CCNC: 17 U/L (ref 10–35)
BASOPHILS # BLD: <0.03 K/UL (ref 0–0.2)
BASOPHILS NFR BLD: 0 % (ref 0–2)
BILIRUB SERPL-MCNC: 0.3 MG/DL (ref 0–1.2)
BUN SERPL-MCNC: 8 MG/DL (ref 8–23)
CALCIUM SERPL-MCNC: 9 MG/DL (ref 8.6–10.4)
CHLORIDE SERPL-SCNC: 107 MMOL/L (ref 98–107)
CO2 SERPL-SCNC: 26 MMOL/L (ref 20–31)
CREAT SERPL-MCNC: 0.8 MG/DL (ref 0.7–1.2)
EOSINOPHIL # BLD: 0.19 K/UL (ref 0–0.44)
EOSINOPHILS RELATIVE PERCENT: 4 % (ref 0–4)
ERYTHROCYTE [DISTWIDTH] IN BLOOD BY AUTOMATED COUNT: 12.9 % (ref 11.5–14.9)
GFR, ESTIMATED: 70 ML/MIN/1.73M2
GLUCOSE SERPL-MCNC: 105 MG/DL (ref 74–99)
HCT VFR BLD AUTO: 36.5 % (ref 36–46)
HGB BLD-MCNC: 12.1 G/DL (ref 12–16)
IMM GRANULOCYTES # BLD AUTO: <0.03 K/UL (ref 0–0.3)
IMM GRANULOCYTES NFR BLD: 0 %
LYMPHOCYTES NFR BLD: 1.15 K/UL (ref 1.1–3.7)
LYMPHOCYTES RELATIVE PERCENT: 24 % (ref 24–44)
MCH RBC QN AUTO: 30.3 PG (ref 26–34)
MCHC RBC AUTO-ENTMCNC: 33.2 G/DL (ref 31–37)
MCV RBC AUTO: 91.3 FL (ref 80–100)
MONOCYTES NFR BLD: 0.52 K/UL (ref 0.1–1.2)
MONOCYTES NFR BLD: 11 % (ref 3–12)
NEUTROPHILS NFR BLD: 61 % (ref 36–66)
NEUTS SEG NFR BLD: 2.96 K/UL (ref 1.5–8.1)
NRBC BLD-RTO: 0 PER 100 WBC
PLATELET # BLD AUTO: 226 K/UL (ref 150–450)
PMV BLD AUTO: 9 FL (ref 8–13.5)
POTASSIUM SERPL-SCNC: 4.7 MMOL/L (ref 3.7–5.3)
PROT SERPL-MCNC: 5.9 G/DL (ref 6.6–8.7)
RBC # BLD AUTO: 4 M/UL (ref 3.95–5.11)
SODIUM SERPL-SCNC: 141 MMOL/L (ref 136–145)
WBC OTHER # BLD: 4.9 K/UL (ref 3.5–11)

## 2025-05-30 PROCEDURE — 6370000000 HC RX 637 (ALT 250 FOR IP)

## 2025-05-30 PROCEDURE — 6360000002 HC RX W HCPCS: Performed by: SURGERY

## 2025-05-30 PROCEDURE — APPSS30 APP SPLIT SHARED TIME 16-30 MINUTES: Performed by: NURSE PRACTITIONER

## 2025-05-30 PROCEDURE — 6360000002 HC RX W HCPCS

## 2025-05-30 PROCEDURE — 36415 COLL VENOUS BLD VENIPUNCTURE: CPT

## 2025-05-30 PROCEDURE — 80053 COMPREHEN METABOLIC PANEL: CPT

## 2025-05-30 PROCEDURE — 99239 HOSP IP/OBS DSCHRG MGMT >30: CPT | Performed by: INTERNAL MEDICINE

## 2025-05-30 PROCEDURE — 99231 SBSQ HOSP IP/OBS SF/LOW 25: CPT | Performed by: INTERNAL MEDICINE

## 2025-05-30 PROCEDURE — 85025 COMPLETE CBC W/AUTO DIFF WBC: CPT

## 2025-05-30 PROCEDURE — 2580000003 HC RX 258: Performed by: SURGERY

## 2025-05-30 RX ADMIN — ASPIRIN 81 MG: 81 TABLET, COATED ORAL at 09:57

## 2025-05-30 RX ADMIN — FERROUS SULFATE TAB 325 MG (65 MG ELEMENTAL FE) 325 MG: 325 (65 FE) TAB at 09:57

## 2025-05-30 RX ADMIN — ENOXAPARIN SODIUM 40 MG: 100 INJECTION SUBCUTANEOUS at 10:01

## 2025-05-30 RX ADMIN — SODIUM CHLORIDE, PRESERVATIVE FREE 40 MG: 5 INJECTION INTRAVENOUS at 09:58

## 2025-05-30 RX ADMIN — POLYETHYLENE GLYCOL 3350 17 G: 17 POWDER, FOR SOLUTION ORAL at 09:53

## 2025-05-30 NOTE — PROGRESS NOTES
[facility] Internal Medicine  Adrian Lopez MD; Edgar Cadena MD; Jared Horvath MD;  Stefania Brown MD; Amelia Tilley MD  [facility] Internal Medicine   IN-PATIENT SERVICE  [facility]                 Date:   5/28/2025  Patientname:  Amina Woods  Date of admission:  5/28/2025  2:35 PM  MRN:   [id number]  Account:  [id number]  YOB: 1935  PCP:    Paradise Estrella MD  Room:   10/10  Code Status:    Full Code      Chief Complaint:     Chief Complaint   Patient presents with    Abdominal Pain     History of Present Illness:     Amina Woods is a 89 y.o. Non- / non  female who presents with Abdominal Pain   and is admitted to the hospital for the management of Abdominal pain.    Patient arrives to the ED with complaints of abdominal pain.  Patient has a significant medical history of hyperlipidemia, and orthostatic hypertension. Patient stated that she has been experiencing severe lower quadrant abdominal pain that began around 630 this morning and has been persistent throughout the day.      Patient states that the pain began after she had a morning bowel movement and the pain stretched across the lower abdomen.  Pain is point tender on the left side but radiates across the lower banding of the abdomen.  The pain has decreased in severity over the course of the day, but is associated with nausea but no vomiting.    Patient's blood work is overall reassuring with no evidence of leukocytosis and no electrolyte abnormalities.  A CT of the abdomen pelvis was completed that does show large hiatal hernia of with most of the stomach herniated into the chest. Diffuse pancreatic ductal dilatation, of uncertain etiology and acuity. Minimal intrahepatic biliary ductal prominence.  There is a left inguinal hernia which contains a segment of sigmoid colon as well as fluid.  Large amount of stool throughout the colon.  There is a large amount of stool at 
   GI Progress notes    5/30/2025   9:40 AM    Name:  Amina Woods  MRN:    [id number]     Acct:     [id number]   Room:  2078/2078-01   Day: 2     Admit Date: 5/28/2025  2:35 PM  PCP: Paradise Estrella MD    Subjective:     C/C:   Chief Complaint   Patient presents with    Abdominal Pain       Interval History: Status: improved.     Patient seen and examined.  No acute events overnight.  Abdominal pain resolved  Had multiple non-bloody BM  MRI MRCP:   1. Moderately dilated and tortuous main pancreatic duct, measuring up to 1.2 cm in diameter. No pancreatic lesions or peripancreatic collections.  2. No CBD dilatation.  No choledocholithiasis or cholelithiasis.  No  cholecystitis.  3. Small side branch IPMNs in the tail of the pancreas.  4. Large hiatal hernia.  5. Left adrenal myelolipoma.  6. Left renal cyst.    ROS:  Constitutional: negative for chills, fevers and sweats  Respiratory: negative for cough, dyspnea on exertion, hemoptysis, shortness of breath and wheezing  Cardiovascular: negative for chest pain, chest pressure/discomfort, dyspnea, lower extremity edema and palpitations  Gastrointestinal: negative for abdominal pain, constipation, diarrhea, nausea and vomiting  Neurological: negative for dizziness and headaches    Medications:     Allergies:   Allergies   Allergen Reactions    Latex Rash    Adhesive Tape Other (See Comments)     Tears her skin    Anesthetic [Benzocaine]      Difficulty waking    Aspercreme Lidocaine [Lidocaine]     Benzocaine-Benzethonium      Difficulty waking    Pcn [Penicillins] Rash     Other reaction(s): Intolerance       Current Meds: glucose chewable tablet 16 g, PRN  dextrose bolus 10% 125 mL, PRN   Or  dextrose bolus 10% 250 mL, PRN  glucagon injection 1 mg, PRN  dextrose 10 % infusion, Continuous PRN  polyethylene glycol (GLYCOLAX) packet 17 g, BID  linaCLOtide (LINZESS) capsule 72 mcg, QAM AC  mineral oil enema 1 enema, Once  sodium chloride flush 0.9 % 
Consultation discussed with the ER attending.  CT scan findings noted.  Blood work all unremarkable.  GI consult.  Will evaluate the patient tomorrow.  Formal consult to follow.  Advanced age of 89 years.  Nothing acute at this time based on the CT findings and blood work.  
Patient and daughter given discharge instructions. All questions answered. IV was removed without complications. All personal belongings were gathered. Patient was taken down by wheelchair.   
Patient arrived to unit. BP is elevated, Dorene Nieto NP notified and orders for PRN hydralazine given. Patient orientated to room and resting comfortably.   
Pharmacy Medication History Note      List of current medications patient is taking is complete.    Source of information: Sure Scripts, Care Everywhere, OARRS     Changes made to medication list:  Medications removed (include reason, ex. therapy complete or physician discontinued, noncompliance):  None     Medications flagged for provider review:  Mupirocin ointment - course complete   Prednisolone ophthalmic suspension - last filled in July 2024 for 16 days     Medications added/doses adjusted:  None     Other notes (ex. Recent course of antibiotics, Coumadin dosing):  The patient filled a 7 day course of clindamycin on 5/20/25.   OARRS negative       Current Home Medication List at Time of Admission:  Prior to Admission medications    Medication Sig   mupirocin (BACTROBAN) 2 % ointment Apply topically 3 times daily.  Patient not taking: Reported on 5/28/2025   diclofenac (VOLTAREN) 75 MG EC tablet TAKE 1 TABLET TWICE A DAY   omeprazole (PRILOSEC) 20 MG delayed release capsule TAKE 1 CAPSULE DAILY   cyanocobalamin 1000 MCG tablet Take 1 tablet by mouth daily   ferrous sulfate (IRON 325) 325 (65 Fe) MG tablet Take 1 tablet by mouth in the morning and at bedtime   prednisoLONE acetate (PRED FORTE) 1 % ophthalmic suspension Place 1 drop into both eyes  Patient not taking: Reported on 5/28/2025   aspirin 81 MG EC tablet Take 81mg twice daily through 9/9/24, then decrease to 81mg every other day (previous home dose).   acetaminophen (TYLENOL) 325 MG tablet Take 2 tablets by mouth every 6 hours as needed for Pain   vitamin D (CHOLECALCIFEROL) 1000 UNIT TABS tablet Take 3 tablets by mouth daily         Please let me know if you have any questions about this encounter. Thank you!    Electronically signed by Cortney Baumann RPH on 5/28/2025 at 8:16 PM   
Ronald Novoa NP(GI) notified for both consults.  
Sent [facility] a message to see if they are ok with discharge. Waiting for response      [id number] Pamela responded. They are ok with discharge.     
hernia 5/29/2025 Yes    Non-recurrent unilateral inguinal hernia without obstruction or gangrene 5/29/2025 Yes    Pancreatic duct dilated 5/29/2025 Yes       Plan:     Patient status inpatient in the Med/Surge    89-year-old female presented with LLQ abdominal pain.  CT scan consistent with pancreatic ductal dilatation.  Pancreatic ductal dilatation.  GI consult.  Get MRCP done.  Large hiatal hernia with stomach herniated into the chest.  In general surgery consult, continue conservative management for now with patient's age.  Constipation with large amount of stool throughout the colon.  GlycoLax, stool softeners.  Active bowel send and passing bowel gas.  Patient complaining of dizziness when getting up, patient has not been eating well.  Will start patient on IV fluids.  Check orthostatics.  Left inguinal hernia.  Chronic anemia.  Continue iron tablets.  DVT prophylaxis.  Lovenox.  Addendum: Discussed with general surgery, patient has hiatal hernia will most likely need outpatient evaluation.  Inguinal hernia outpatient versus inpatient management.  Patient will be moderate risk for the proposed surgery for inguinal hernia.      Patient seen and examined, abdominal pain has resolved LFTs normal, MRCP results there reviewed, no pancreatic lesion, or choledocholithiasis, found to have moderately dilated and tortuous main pancreatic duct, will advance the diet, seen by general surgeon, recommended outpatient outpatient intervention, no leukocytosis, vital stable  Will discharge once cleared by GI  Consultations:   IP CONSULT TO GENERAL SURGERY  IP CONSULT TO GI  IP CONSULT TO GI    Patient is admitted as inpatient status because of co-morbidities listed above, severity of signs and symptoms as outlined, requirement for current medical therapies and most importantly because of direct risk to patient if care not provided in a hospital setting.  Expected length of stay > 48 hours.    Stefania Brown MD  5/30/2025  8:06

## 2025-05-30 NOTE — DISCHARGE INSTR - COC
PF, 0.5mL 09/18/2017, 09/28/2018    Pneumococcal, PCV-13, PREVNAR 13, (age 6w+), IM, 0.5mL 05/29/2015    Pneumococcal, PPSV23, PNEUMOVAX 23, (age 2y+), SC/IM, 0.5mL 10/01/2006    RSV, AREXVY, (age 60y+), PF, IM, 0.5mL 12/04/2023    Zoster Live (Zostavax) 12/03/2015       Active Problems:  Patient Active Problem List   Diagnosis Code    Osteoarthritis M19.90    Hyperlipidemia E78.5    Mild hyperglycemia. R73.9    Hypotension I95.9    Psoriasis L40.9    Pre-cancerous skin lesions. 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    Anemia D64.9    Dizziness R42    Acid reflux K21.9    Osteopenia M85.80    Vasovagal syncope R55    Vitamin D deficiency E55.9    Drug-induced constipation K59.03    Lumbar radiculopathy M54.16    Osteoarthritis of right hip M16.11    Primary osteoarthritis of right hip M16.11    Debility R53.81    S/P hip replacement, right Z96.641    Peripheral vascular disease I73.9    Abdominal pain R10.9    Stercoral colitis K52.89    Hiatal hernia K44.9    Non-recurrent unilateral inguinal hernia without obstruction or gangrene K40.90    Pancreatic duct dilated K86.89       Isolation/Infection:   Isolation            No Isolation          Patient Infection Status    None to display         Nurse Assessment:  Last Vital Signs: BP (!) 152/69   Pulse 70   Temp 97.3 °F (36.3 °C) (Oral)   Resp 15   Ht 1.6 m (5' 2.99\")   Wt 73.4 kg (161 lb 13.1 oz)   SpO2 95%   BMI 28.67 kg/m²     Last documented pain score (0-10 scale): Pain Level: 6  Last Weight:   Wt Readings from Last 1 Encounters:   05/28/25 73.4 kg (161 lb 13.1 oz)     Mental Status:  {IP PT MENTAL STATUS:28778}    IV Access:  {[facility] IV ACCESS:773850494}    Nursing Mobility/ADLs:  Walking   {[facility] DME ADLs:852543183}  Transfer  {[facility] DME ADLs:777338042}  Bathing  {[facility] DME ADLs:861583827}  Dressing  {[facility] DME ADLs:431524969}  Toileting  {[facility] DME ADLs:418706283}  Feeding

## 2025-05-30 NOTE — PLAN OF CARE
Problem: Discharge Planning  Goal: Discharge to home or other facility with appropriate resources  Outcome: Completed     Problem: ABCDS Injury Assessment  Goal: Absence of physical injury  Outcome: Completed     Problem: Pain  Goal: Verbalizes/displays adequate comfort level or baseline comfort level  Outcome: Completed     Problem: Skin/Tissue Integrity  Goal: Skin integrity remains intact  Description: 1.  Monitor for areas of redness and/or skin breakdown2.  Assess vascular access sites hourly3.  Every 4-6 hours minimum:  Change oxygen saturation probe site4.  Every 4-6 hours:  If on nasal continuous positive airway pressure, respiratory therapy assess nares and determine need for appliance change or resting period  Outcome: Completed     Problem: Nutrition Deficit:  Goal: Optimize nutritional status  Outcome: Completed     Problem: Safety - Adult  Goal: Free from fall injury  Outcome: Completed

## 2025-05-30 NOTE — CONSULTS
[facility] General Surgery   Bolivar Reed MD, FACS  Pamela CORLEYChela Ventura, APRN-CNP  [address], [address]  [place] [address]  P: [phone], F: [phone]    General and Robotic Surgery  Consult Note         PATIENT NAME: Amina Woods   :  1935   MRN: [id number]   PCP:  Paradise Estrella MD   Referring Physician: Dr. HOLGUIN  Reason for Consult: Left inguinal bulge and abnormal CT abdomen    TODAY'S DATE: 2025    HISTORY OF PRESENT ILLNESS: 89 y.o. female presents with constipation.  Increasing discomfort.  Patient lives alone.  Patient stated that she had extensive workup at the [facility] by the GI physician.  ER workup reviewed.  Case discussed with ER physician.  GI service on the case here.  Patient was NPO.  Blood work reviewed.  Medical surgical history noted.    PAST MEDICAL HISTORY     Past Medical History:   Diagnosis Date    Basal cell carcinoma     mulitple    Blood in feces 2016    Hyperlipidemia 2011    Mild hyperglycemia. 2011    Orthostatic hypotension 2011    Osteoarthritis     knees, thumbs    Post-menopausal     Pre-cancerous skin lesions. 2011    Prolonged emergence from general anesthesia     Psoriasis 2011    Syncope, non cardiac     \"vascular syncope\" per patient - last episode was        PROBLEM LIST     Patient Active Problem List   Diagnosis    Osteoarthritis    Hyperlipidemia    Mild hyperglycemia.    Hypotension    Psoriasis    Pre-cancerous skin lesions.    Elevated blood pressure reading    S/P total knee replacement    Disorder of lipid metabolism    Anemia    Dizziness    Acid reflux    Osteopenia    Vasovagal syncope    Vitamin D deficiency    Drug-induced constipation    Lumbar radiculopathy    Osteoarthritis of right hip    Primary osteoarthritis of right hip    Debility    S/P hip replacement, right    Peripheral vascular disease    Abdominal pain       SURGICAL HISTORY       Past Surgical 
as ordered  Will try to obtain records from Dr. Agarwal's office  May require outpatient EUS  Further recommendations to follow    This plan was formulated in collaboration with Dr. Mckeon    Electronically signed by TISHA Franz NP on 5/30/2025 at 9:32 AM     Note is dictated utilizing voice recognition software. Unfortunately this leads to occasional typographical errors. Please contact our office if you have any questions.

## 2025-05-30 NOTE — CARE COORDINATION
DISCHARGE PLANNING NOTE:    Hospital follow up appointment scheduled for June 6, 2025 at 2:00 PM.     Electronically signed by Jazmin Moreno RN on 5/30/2025 at 10:52 AM

## 2025-05-30 NOTE — TELEPHONE ENCOUNTER
Pts daughter Karolyn called and left a voicemail in regards to Amina needing  a follow up within a week. Reviewed consult note and I do not see where she needs to be seen within one week.  When would you like this patient scheduled?  Please advise

## 2025-05-30 NOTE — CARE COORDINATION
Case Management   Daily Progress Note       Patient Name: Amina Woods                   YOB: 1935  Diagnosis: Hiatal hernia [K44.9]  Abdominal pain [R10.9]  Pancreatic duct dilated [K86.89]  Non-recurrent unilateral inguinal hernia without obstruction or gangrene [K40.90]  Stercoral colitis [K52.89]                       GMLOS: 2.5 days  Length of Stay: 2  days    Readmission Risk (Low < 19, Mod (19-27), High > 27): Readmission Risk Score: 13.1      Patient is alert and oriented.    Spoke with patient, and Current Transitional Plan is:    [x] Home Independently    [] Home with HC    [] Skilled Nursing Facility    [] Acute Rehabilitation    [] Long Term Acute Care (LTAC)    [] Other:     Medical Management: Full liquid diet.     Testing Ordered: Patient had upper GI without KUB performed 5/29. MRCP show no choledocholithiasis.     Additional Notes: GI signed off, outpatient follow up with Dr. Agarwal on discharge.    General surgery-large hiatal hernia, inguinal hernia outpatient follow?     IMM letter provided to patient.  Patient offered four hours to make informed decision regarding appeal process; patient agreeable to discharge.     Electronically signed by Jazmin Moreno RN on 5/30/2025 at 9:17 AM

## 2025-06-02 ENCOUNTER — CARE COORDINATION (OUTPATIENT)
Dept: CARE COORDINATION | Age: 89
End: 2025-06-02

## 2025-06-02 DIAGNOSIS — R10.84 GENERALIZED ABDOMINAL PAIN: Primary | ICD-10-CM

## 2025-06-02 PROCEDURE — 1111F DSCHRG MED/CURRENT MED MERGE: CPT | Performed by: FAMILY MEDICINE

## 2025-06-02 NOTE — CARE COORDINATION
Care Transitions Note    Initial Call - Call within 2 business days of discharge: Yes    Patient Current Location:  [place]    Care Transition Nurse contacted the family, Karolyn  by telephone to perform post hospital discharge assessment, verified name and  as identifiers.  Provided introduction to self, and explanation of the Care Transition Nurse role.    Patient: Amina Woods    Patient : 1935   MRN: [id number]    Reason for Admission: Stercoral colitis   Discharge Date: 25  RURS: Readmission Risk Score: 13.5      Last Discharge Facility       Date Complaint Diagnosis Description Type Department Provider    25 Abdominal Pain Stercoral colitis ... ED to Hosp-Admission (Discharged) (ADMITTED) [facility] Amelia Morris MD; [facility]...            Was this an external facility discharge? No    Additional needs identified to be addressed with provider   No needs identified             Method of communication with provider: none.    Patients top risk factors for readmission: functional physical ability, lack of knowledge about disease, and medical condition-HTN,    Interventions to address risk factors:   Review of patient management of conditions/medications: discharge    Care Summary Note: Attempted to contact Amina, but no answer.  Call placed to daughter, Karolyn.  She said that her mother was doing better.  Amina went to the hospital for lower abdominal pain.  She was found to have a large stool burden, colitis, and dilated pancreatic duct, large hiatal hernia and abdominal hernia.    Karolyn said that Amina pain was better and they felt that it was because of the large amount of stool.  She said that Amina does eat small frequent meals.  Amina will be seeing her GI physician and they will discuss the abdominal hernia.  They know that the hiatal surgery is not feasible with her age.  They will also discuss the duct dilation with GI also.  Karolyn said that Amina has all her medications,

## 2025-06-04 DIAGNOSIS — Z12.31 ENCOUNTER FOR SCREENING MAMMOGRAM FOR BREAST CANCER: ICD-10-CM

## 2025-06-04 NOTE — TELEPHONE ENCOUNTER
I called Karolyn today to discuss scheduling an appointment for Amina. Amina is seeing a GI specialist and they have ordered another test and advised Amina to not schedule with any providers until after test and results.

## 2025-06-05 ENCOUNTER — RESULTS FOLLOW-UP (OUTPATIENT)
Dept: PRIMARY CARE CLINIC | Age: 89
End: 2025-06-05

## 2025-06-06 ENCOUNTER — OFFICE VISIT (OUTPATIENT)
Dept: PRIMARY CARE CLINIC | Age: 89
End: 2025-06-06

## 2025-06-06 VITALS
OXYGEN SATURATION: 95 % | HEART RATE: 89 BPM | HEIGHT: 63 IN | SYSTOLIC BLOOD PRESSURE: 142 MMHG | DIASTOLIC BLOOD PRESSURE: 70 MMHG | WEIGHT: 162.8 LBS | BODY MASS INDEX: 28.84 KG/M2

## 2025-06-06 DIAGNOSIS — E86.1 HYPOTENSION DUE TO HYPOVOLEMIA: ICD-10-CM

## 2025-06-06 DIAGNOSIS — R10.84 GENERALIZED ABDOMINAL PAIN: ICD-10-CM

## 2025-06-06 DIAGNOSIS — K86.89 PANCREATIC DUCT DILATED: Primary | ICD-10-CM

## 2025-06-06 RX ORDER — MULTIVIT WITH IRON,MINERALS
TABLET,CHEWABLE ORAL
COMMUNITY

## 2025-06-06 RX ORDER — CLOTRIMAZOLE AND BETAMETHASONE DIPROPIONATE 10; .64 MG/G; MG/G
1 CREAM TOPICAL
COMMUNITY

## 2025-06-06 NOTE — PROGRESS NOTES
[facility]  [facility]  [address]  [facility] [facility] [address]  Dept: [phone]     Post-Discharge Transitional Care  Follow Up    Amina Woods   YOB: 1935     Date of Office Visit: 6/6/2025   Date of Hospital Admission:  5/28/25  Date of Hospital Discharge: 5/30/25  Risk of hospital readmission (high >=14%. Medium >=10%) : Readmission Risk Score: 13.5       Care management risk score Rising risk (score 2-5) and Complex Care (Scores >=6): No Risk Score On File     Non face to face  following discharge, date last encounter closed (first attempt may have been earlier): 06/02/2025     Call initiated 2 business days of discharge: Yes     Subjective    HPI (location, quality, severity, duration, timing, modifying factors, associated sign & symptoms - need >4 documented)    History of Present Illness  The patient presents for evaluation of abdominal pain, hiatal hernia, Paget's disease, and iron deficiency.    She experienced a severe episode of abdominal pain that began in the morning and progressively worsened, prompting her to seek medical attention at the hospital on 06/06/2025. The pain eventually subsided without intervention, and she did not experience any associated nausea or vomiting. She was advised to take MiraLAX daily to prevent constipation. She also reported an incident of diarrhea after consuming a Bloomin' Onion with garlic sauce, which she suspects may have been the cause.    She has been diagnosed with a hiatal hernia, which does not cause significant discomfort. Her stomach is positioned above her diaphragm, but she does not experience heartburn. Her appetite is notably reduced, likely due to the abnormal positioning of her stomach.    She underwent a bone scan on 06/04/2025, which revealed findings suggestive of Paget's disease. She was advised to temporarily discontinue calcium supplements.    She was prescribed iron supplements twice

## 2025-06-06 NOTE — PATIENT INSTRUCTIONS
Ask Dr. Agarwal about decreasing iron back to once a day or maybe discontinuing it. Also about the MRCP/ pancreatic duct.

## 2025-06-11 ENCOUNTER — CARE COORDINATION (OUTPATIENT)
Dept: CARE COORDINATION | Age: 89
End: 2025-06-11

## 2025-06-11 NOTE — CARE COORDINATION
Care Transitions Note    Follow Up Call     Attempted to reach patientfamily Edna  for transitions of care follow up.  Unable to reach patientfamily Edna .      Outreach Attempts:   HIPAA compliant voicemail left for patient familyKarolyn.     Care Summary Note: 1st attempt    Follow Up Appointment:   Future Appointments         Provider Specialty Dept Phone    7/3/2025 9:30 AM Paradise Estrella MD Primary Care [phone]            Plan for follow-up on next business day.  based on severity of symptoms and risk factors. Plan for next call: symptom management-follow up on abdominal pain, constipation    follow-up appointment-with PCP and when is GI appointment    MULU HOWELL RN    Care Transitions Note    Follow Up Call     Patient Current Location:  [place]    Care Transition Nurse contacted the Karolyn jones  by telephone. Verified name and  as identifiers.    Additional needs identified to be addressed with provider   No needs identified                 Method of communication with provider: none.    Care Summary Note: DaughterKarolyn returned call.  She said that her mother was doing well.  She said that Amina saw Dr Agarwal and a plan of care was formulated with pt in mind.  Karolyn said that they will be going through on the hernia repair with the surgeon Dr Cm Martini.   The hiatal hernia is too risky and that will not be fixed.  Encouraged Amina to eat fiber and 6 small meals.  Finally the dilated pancreatic duct will not have MRCP.  He felt that it has been there for awhile and will just do surveillance   Karolyn said that she still has to call the surgeon yet to make an appointment.  She said that Amina is doing well with the Miralax.  Karolyn then had to end the call.     Plan of care updates since last contact:  Review of patient management of conditions/medications:         Advance Care Planning:   Does patient have an Advance Directive: reviewed during previous call, see note. .    Medication

## 2025-06-19 ENCOUNTER — CARE COORDINATION (OUTPATIENT)
Dept: CARE COORDINATION | Age: 89
End: 2025-06-19

## 2025-06-19 NOTE — CARE COORDINATION
Care Transitions Note    Follow Up Call     Attempted to reach family,   for transitions of care follow up.  Unable to reach family,  .      Outreach Attempts:   HIPAA compliant voicemail left for family, daughter.     Care Summary Note: 2nd attempt-will end care transitions if no return call received.     Follow Up Appointment:   Future Appointments         Provider Specialty Dept Phone    7/3/2025 9:30 AM Paradise Estrella MD Primary Care [phone]            No further follow-up call indicated based on severity of symptoms and risk factors.    Claire Hopkins LPN

## 2025-06-27 SDOH — HEALTH STABILITY: PHYSICAL HEALTH: ON AVERAGE, HOW MANY MINUTES DO YOU ENGAGE IN EXERCISE AT THIS LEVEL?: 40 MIN

## 2025-06-27 SDOH — HEALTH STABILITY: PHYSICAL HEALTH: ON AVERAGE, HOW MANY DAYS PER WEEK DO YOU ENGAGE IN MODERATE TO STRENUOUS EXERCISE (LIKE A BRISK WALK)?: 2 DAYS

## 2025-06-27 ASSESSMENT — PATIENT HEALTH QUESTIONNAIRE - PHQ9
1. LITTLE INTEREST OR PLEASURE IN DOING THINGS: NOT AT ALL
2. FEELING DOWN, DEPRESSED OR HOPELESS: NOT AT ALL
SUM OF ALL RESPONSES TO PHQ QUESTIONS 1-9: 0

## 2025-06-27 ASSESSMENT — LIFESTYLE VARIABLES
HOW MANY STANDARD DRINKS CONTAINING ALCOHOL DO YOU HAVE ON A TYPICAL DAY: 0
HOW OFTEN DO YOU HAVE SIX OR MORE DRINKS ON ONE OCCASION: 1
HOW OFTEN DO YOU HAVE A DRINK CONTAINING ALCOHOL: NEVER
HOW MANY STANDARD DRINKS CONTAINING ALCOHOL DO YOU HAVE ON A TYPICAL DAY: PATIENT DOES NOT DRINK
HOW OFTEN DO YOU HAVE A DRINK CONTAINING ALCOHOL: 1

## 2025-07-03 ENCOUNTER — OFFICE VISIT (OUTPATIENT)
Dept: PRIMARY CARE CLINIC | Age: 89
End: 2025-07-03

## 2025-07-03 VITALS
BODY MASS INDEX: 29.23 KG/M2 | WEIGHT: 165 LBS | HEART RATE: 88 BPM | HEIGHT: 63 IN | DIASTOLIC BLOOD PRESSURE: 82 MMHG | SYSTOLIC BLOOD PRESSURE: 158 MMHG | OXYGEN SATURATION: 96 %

## 2025-07-03 DIAGNOSIS — Z00.00 MEDICARE ANNUAL WELLNESS VISIT, SUBSEQUENT: Primary | ICD-10-CM

## 2025-07-03 NOTE — PROGRESS NOTES
Medicare Annual Wellness Visit    Amina Woods is here for Medicare AWV (Pt here today for Medicare Annual Wellness Visit. Pt given three words to remember: phone, happy, blue. Time given to draw was 11:15. )    Assessment & Plan  1. Health maintenance.  - Scheduled for hernia repair surgery in September 2025; has 2 hernias, one causing stomach to press against diaphragm.  - No previous hernia repair surgery; history includes right hip surgery twice, both knees replaced, colonoscopies, sigmoidoscopies, fistula repair, hysterectomy, right foot hammertoe surgery, appendix removal during hysterectomy, cataracts in both eyes, recent laser treatment for eye cloudiness, and basal cell skin cancer removal.  - Uses a cane or walker for long distances; no falls reported; exercises 2 days a week; has a stationary bike but experiences some difficulty getting on and off.  - Advised to inform anesthesiologist about previous syncopal episode post-hip surgery for appropriate fluid management during upcoming hernia repair surgery; recommended to continue using stationary bike if it does not cause discomfort; needs cardiologist clearance before surgery.    2. Constipation.  - Advised against daily laxative use; recommended regular stool softeners or MiraLAX as needed.    3. Dental abscess.  - Reported having an abscess on her tooth that needs to be addressed.    4. Allergies.  - History of adverse reactions to latex, Aspercreme, benzocaine, adhesive tape, and penicillin; discontinued various pain relief products due to skin irritation; issues with estrogen patches following hysterectomy.    5. Immunizations.  - Received shingles vaccine in 2015; has had pneumonia shot and RSV vaccine; informed about newer shingles vaccine with 90%-95% effectiveness compared to 75% effectiveness of the 2015 vaccine; informed about tetanus whooping cough vaccine, which she has not yet received.  Medicare annual wellness visit,

## 2025-08-04 RX ORDER — OMEPRAZOLE 20 MG/1
20 CAPSULE, DELAYED RELEASE ORAL DAILY
Qty: 90 CAPSULE | Refills: 2 | Status: SHIPPED | OUTPATIENT
Start: 2025-08-04

## 2025-08-04 RX ORDER — DICLOFENAC SODIUM 75 MG/1
75 TABLET, DELAYED RELEASE ORAL 2 TIMES DAILY
Qty: 180 TABLET | Refills: 2 | Status: SHIPPED | OUTPATIENT
Start: 2025-08-04

## 2025-08-18 ENCOUNTER — HOSPITAL ENCOUNTER (OUTPATIENT)
Dept: PREADMISSION TESTING | Age: 89
Discharge: HOME | End: 2025-08-22
Payer: MEDICARE

## 2025-08-18 ENCOUNTER — HOSPITAL ENCOUNTER (OUTPATIENT)
Age: 89
Setting detail: SPECIMEN
Discharge: HOME | End: 2025-08-18

## 2025-08-18 VITALS
DIASTOLIC BLOOD PRESSURE: 70 MMHG | HEIGHT: 63 IN | HEART RATE: 78 BPM | RESPIRATION RATE: 16 BRPM | BODY MASS INDEX: 29.59 KG/M2 | SYSTOLIC BLOOD PRESSURE: 142 MMHG | WEIGHT: 167 LBS | TEMPERATURE: 97.1 F | OXYGEN SATURATION: 98 %

## 2025-08-18 DIAGNOSIS — Z01.818 PRE-OP TESTING: Primary | ICD-10-CM

## 2025-08-18 LAB
ALBUMIN SERPL-MCNC: 4.3 G/DL (ref 3.5–5.2)
ALBUMIN/GLOB SERPL: 1.7 {RATIO} (ref 1–2.5)
ALP SERPL-CCNC: 121 U/L (ref 35–104)
ALT SERPL-CCNC: 13 U/L (ref 10–35)
ANION GAP SERPL CALCULATED.3IONS-SCNC: 10 MMOL/L (ref 9–16)
ANION GAP SERPL CALCULATED.3IONS-SCNC: 11 MMOL/L (ref 9–16)
AST SERPL-CCNC: 18 U/L (ref 10–35)
BASOPHILS # BLD: <0.03 K/UL (ref 0–0.2)
BASOPHILS NFR BLD: 0 % (ref 0–2)
BILIRUB SERPL-MCNC: <0.2 MG/DL (ref 0–1.2)
BUN SERPL-MCNC: 21 MG/DL (ref 8–23)
BUN SERPL-MCNC: 21 MG/DL (ref 8–23)
CALCIUM SERPL-MCNC: 9.6 MG/DL (ref 8.6–10.4)
CALCIUM SERPL-MCNC: 9.7 MG/DL (ref 8.6–10.4)
CHLORIDE SERPL-SCNC: 103 MMOL/L (ref 98–107)
CHLORIDE SERPL-SCNC: 103 MMOL/L (ref 98–107)
CO2 SERPL-SCNC: 28 MMOL/L (ref 20–31)
CO2 SERPL-SCNC: 29 MMOL/L (ref 20–31)
CREAT SERPL-MCNC: 1 MG/DL (ref 0.6–0.9)
CREAT SERPL-MCNC: 1 MG/DL (ref 0.6–0.9)
EOSINOPHIL # BLD: 0.18 K/UL (ref 0–0.44)
EOSINOPHILS RELATIVE PERCENT: 3 % (ref 1–4)
ERYTHROCYTE [DISTWIDTH] IN BLOOD BY AUTOMATED COUNT: 13.7 % (ref 11.8–14.4)
ERYTHROCYTE [DISTWIDTH] IN BLOOD BY AUTOMATED COUNT: 13.8 % (ref 11.8–14.4)
FERRITIN SERPL-MCNC: 109 NG/ML
FOLATE SERPL-MCNC: >40 NG/ML (ref 4.8–24.2)
GFR, ESTIMATED: 54 ML/MIN/1.73M2
GFR, ESTIMATED: 54 ML/MIN/1.73M2
GLUCOSE SERPL-MCNC: 85 MG/DL (ref 74–99)
GLUCOSE SERPL-MCNC: 97 MG/DL (ref 74–99)
HCT VFR BLD AUTO: 41.7 % (ref 36.3–47.1)
HCT VFR BLD AUTO: 41.8 % (ref 36.3–47.1)
HGB BLD-MCNC: 13 G/DL (ref 11.9–15.1)
HGB BLD-MCNC: 13.1 G/DL (ref 11.9–15.1)
IMM GRANULOCYTES # BLD AUTO: <0.03 K/UL (ref 0–0.3)
IMM GRANULOCYTES NFR BLD: 0 %
IRON SATN MFR SERPL: 31 % (ref 20–55)
IRON SERPL-MCNC: 95 UG/DL (ref 37–145)
LYMPHOCYTES NFR BLD: 1.42 K/UL (ref 1.1–3.7)
LYMPHOCYTES RELATIVE PERCENT: 22 % (ref 24–43)
MCH RBC QN AUTO: 29.5 PG (ref 25.2–33.5)
MCH RBC QN AUTO: 29.9 PG (ref 25.2–33.5)
MCHC RBC AUTO-ENTMCNC: 31.1 G/DL (ref 28.4–34.8)
MCHC RBC AUTO-ENTMCNC: 31.4 G/DL (ref 28.4–34.8)
MCV RBC AUTO: 95 FL (ref 82.6–102.9)
MCV RBC AUTO: 95.2 FL (ref 82.6–102.9)
MONOCYTES NFR BLD: 0.55 K/UL (ref 0.1–1.2)
MONOCYTES NFR BLD: 9 % (ref 3–12)
NEUTROPHILS NFR BLD: 66 % (ref 36–65)
NEUTS SEG NFR BLD: 4.17 K/UL (ref 1.5–8.1)
NRBC BLD-RTO: 0 PER 100 WBC
NRBC BLD-RTO: 0 PER 100 WBC
PLATELET # BLD AUTO: 289 K/UL (ref 138–453)
PLATELET # BLD AUTO: 293 K/UL (ref 138–453)
PMV BLD AUTO: 9 FL (ref 8.1–13.5)
PMV BLD AUTO: 9 FL (ref 8.1–13.5)
POTASSIUM SERPL-SCNC: 5.7 MMOL/L (ref 3.7–5.3)
POTASSIUM SERPL-SCNC: 5.9 MMOL/L (ref 3.7–5.3)
PROT SERPL-MCNC: 6.9 G/DL (ref 6.6–8.7)
RBC # BLD AUTO: 4.38 M/UL (ref 3.95–5.11)
RBC # BLD AUTO: 4.4 M/UL (ref 3.95–5.11)
SODIUM SERPL-SCNC: 142 MMOL/L (ref 136–145)
SODIUM SERPL-SCNC: 142 MMOL/L (ref 136–145)
TIBC SERPL-MCNC: 302 UG/DL (ref 250–450)
UNSATURATED IRON BINDING CAPACITY: 207 UG/DL (ref 112–347)
VIT B12 SERPL-MCNC: 983 PG/ML (ref 232–1245)
WBC OTHER # BLD: 6.3 K/UL (ref 3.5–11.3)
WBC OTHER # BLD: 6.4 K/UL (ref 3.5–11.3)

## 2025-08-18 PROCEDURE — 85027 COMPLETE CBC AUTOMATED: CPT

## 2025-08-18 PROCEDURE — 36415 COLL VENOUS BLD VENIPUNCTURE: CPT

## 2025-08-18 PROCEDURE — 80048 BASIC METABOLIC PNL TOTAL CA: CPT

## 2025-08-18 RX ORDER — MIDODRINE HYDROCHLORIDE 5 MG/1
5 TABLET ORAL DAILY PRN
COMMUNITY

## 2025-08-18 ASSESSMENT — PAIN SCALES - GENERAL: PAINLEVEL_OUTOF10: 0

## 2025-08-19 ENCOUNTER — ANESTHESIA EVENT (OUTPATIENT)
Dept: OPERATING ROOM | Age: 89
End: 2025-08-19
Payer: MEDICARE

## 2025-09-02 ENCOUNTER — HOSPITAL ENCOUNTER (OUTPATIENT)
Age: 89
Setting detail: OUTPATIENT SURGERY
Discharge: HOME | End: 2025-09-02
Attending: SURGERY | Admitting: SURGERY
Payer: MEDICARE

## 2025-09-02 ENCOUNTER — ANESTHESIA (OUTPATIENT)
Dept: OPERATING ROOM | Age: 89
End: 2025-09-02
Payer: MEDICARE

## 2025-09-02 VITALS
DIASTOLIC BLOOD PRESSURE: 77 MMHG | TEMPERATURE: 97.5 F | WEIGHT: 167 LBS | RESPIRATION RATE: 19 BRPM | HEART RATE: 98 BPM | SYSTOLIC BLOOD PRESSURE: 126 MMHG | OXYGEN SATURATION: 95 % | BODY MASS INDEX: 29.58 KG/M2

## 2025-09-02 DIAGNOSIS — K40.90 LEFT INGUINAL HERNIA: Primary | ICD-10-CM

## 2025-09-02 LAB — POTASSIUM SERPL-SCNC: 4.7 MMOL/L (ref 3.7–5.3)

## 2025-09-02 PROCEDURE — S2900 ROBOTIC SURGICAL SYSTEM: HCPCS | Performed by: SURGERY

## 2025-09-02 PROCEDURE — 7100000001 HC PACU RECOVERY - ADDTL 15 MIN: Performed by: SURGERY

## 2025-09-02 PROCEDURE — 6360000002 HC RX W HCPCS: Performed by: SURGERY

## 2025-09-02 PROCEDURE — 2500000003 HC RX 250 WO HCPCS: Performed by: SURGERY

## 2025-09-02 PROCEDURE — 6360000002 HC RX W HCPCS: Performed by: ANESTHESIOLOGY

## 2025-09-02 PROCEDURE — 2709999900 HC NON-CHARGEABLE SUPPLY: Performed by: SURGERY

## 2025-09-02 PROCEDURE — 7100000011 HC PHASE II RECOVERY - ADDTL 15 MIN: Performed by: SURGERY

## 2025-09-02 PROCEDURE — 3700000001 HC ADD 15 MINUTES (ANESTHESIA): Performed by: SURGERY

## 2025-09-02 PROCEDURE — 3600000009 HC SURGERY ROBOT BASE: Performed by: SURGERY

## 2025-09-02 PROCEDURE — 3700000000 HC ANESTHESIA ATTENDED CARE: Performed by: SURGERY

## 2025-09-02 PROCEDURE — 3600000019 HC SURGERY ROBOT ADDTL 15MIN: Performed by: SURGERY

## 2025-09-02 PROCEDURE — 7100000000 HC PACU RECOVERY - FIRST 15 MIN: Performed by: SURGERY

## 2025-09-02 PROCEDURE — 7100000010 HC PHASE II RECOVERY - FIRST 15 MIN: Performed by: SURGERY

## 2025-09-02 PROCEDURE — C1781 MESH (IMPLANTABLE): HCPCS | Performed by: SURGERY

## 2025-09-02 PROCEDURE — 2580000003 HC RX 258: Performed by: ANESTHESIOLOGY

## 2025-09-02 PROCEDURE — 84132 ASSAY OF SERUM POTASSIUM: CPT

## 2025-09-02 PROCEDURE — 6370000000 HC RX 637 (ALT 250 FOR IP): Performed by: ANESTHESIOLOGY

## 2025-09-02 PROCEDURE — 2500000003 HC RX 250 WO HCPCS: Performed by: NURSE ANESTHETIST, CERTIFIED REGISTERED

## 2025-09-02 PROCEDURE — 6360000002 HC RX W HCPCS: Performed by: NURSE ANESTHETIST, CERTIFIED REGISTERED

## 2025-09-02 DEVICE — MESH HERN W10XL15CM POLY POLYLACTIC ACID 70% CLLGN 30% GLYC: Type: IMPLANTABLE DEVICE | Site: INGUINAL | Status: FUNCTIONAL

## 2025-09-02 RX ORDER — SODIUM CHLORIDE 0.9 % (FLUSH) 0.9 %
5-40 SYRINGE (ML) INJECTION PRN
Status: DISCONTINUED | OUTPATIENT
Start: 2025-09-02 | End: 2025-09-02 | Stop reason: HOSPADM

## 2025-09-02 RX ORDER — SODIUM CHLORIDE 9 MG/ML
INJECTION, SOLUTION INTRAVENOUS PRN
Status: DISCONTINUED | OUTPATIENT
Start: 2025-09-02 | End: 2025-09-02 | Stop reason: HOSPADM

## 2025-09-02 RX ORDER — ROCURONIUM BROMIDE 10 MG/ML
INJECTION, SOLUTION INTRAVENOUS
Status: DISCONTINUED | OUTPATIENT
Start: 2025-09-02 | End: 2025-09-02 | Stop reason: SDUPTHER

## 2025-09-02 RX ORDER — SENNA AND DOCUSATE SODIUM 50; 8.6 MG/1; MG/1
1 TABLET, FILM COATED ORAL 2 TIMES DAILY
Qty: 60 TABLET | Refills: 0 | Status: SHIPPED | OUTPATIENT
Start: 2025-09-02

## 2025-09-02 RX ORDER — HYDROMORPHONE HYDROCHLORIDE 1 MG/ML
0.5 INJECTION, SOLUTION INTRAMUSCULAR; INTRAVENOUS; SUBCUTANEOUS EVERY 5 MIN PRN
Status: DISCONTINUED | OUTPATIENT
Start: 2025-09-02 | End: 2025-09-02 | Stop reason: HOSPADM

## 2025-09-02 RX ORDER — ONDANSETRON 4 MG/1
4 TABLET, ORALLY DISINTEGRATING ORAL 3 TIMES DAILY PRN
Qty: 21 TABLET | Refills: 0 | Status: SHIPPED | OUTPATIENT
Start: 2025-09-02

## 2025-09-02 RX ORDER — FENTANYL CITRATE 50 UG/ML
INJECTION, SOLUTION INTRAMUSCULAR; INTRAVENOUS
Status: DISCONTINUED | OUTPATIENT
Start: 2025-09-02 | End: 2025-09-02 | Stop reason: SDUPTHER

## 2025-09-02 RX ORDER — FENTANYL CITRATE 50 UG/ML
25 INJECTION, SOLUTION INTRAMUSCULAR; INTRAVENOUS EVERY 5 MIN PRN
Status: DISCONTINUED | OUTPATIENT
Start: 2025-09-02 | End: 2025-09-02 | Stop reason: HOSPADM

## 2025-09-02 RX ORDER — ONDANSETRON 2 MG/ML
4 INJECTION INTRAMUSCULAR; INTRAVENOUS
Status: DISCONTINUED | OUTPATIENT
Start: 2025-09-02 | End: 2025-09-02 | Stop reason: HOSPADM

## 2025-09-02 RX ORDER — HYDROCODONE BITARTRATE AND ACETAMINOPHEN 5; 325 MG/1; MG/1
1 TABLET ORAL EVERY 4 HOURS PRN
Qty: 30 TABLET | Refills: 0 | Status: SHIPPED | OUTPATIENT
Start: 2025-09-02 | End: 2025-09-07

## 2025-09-02 RX ORDER — PROPOFOL 10 MG/ML
INJECTION, EMULSION INTRAVENOUS
Status: DISCONTINUED | OUTPATIENT
Start: 2025-09-02 | End: 2025-09-02 | Stop reason: SDUPTHER

## 2025-09-02 RX ORDER — OXYCODONE HYDROCHLORIDE 5 MG/1
5 TABLET ORAL
Status: COMPLETED | OUTPATIENT
Start: 2025-09-02 | End: 2025-09-02

## 2025-09-02 RX ORDER — SODIUM CHLORIDE 0.9 % (FLUSH) 0.9 %
5-40 SYRINGE (ML) INJECTION EVERY 12 HOURS SCHEDULED
Status: DISCONTINUED | OUTPATIENT
Start: 2025-09-02 | End: 2025-09-02 | Stop reason: HOSPADM

## 2025-09-02 RX ORDER — DEXAMETHASONE SODIUM PHOSPHATE 10 MG/ML
INJECTION, SOLUTION INTRAMUSCULAR; INTRAVENOUS
Status: DISCONTINUED | OUTPATIENT
Start: 2025-09-02 | End: 2025-09-02 | Stop reason: SDUPTHER

## 2025-09-02 RX ORDER — SODIUM CHLORIDE 9 MG/ML
INJECTION, SOLUTION INTRAVENOUS CONTINUOUS
Status: DISCONTINUED | OUTPATIENT
Start: 2025-09-02 | End: 2025-09-02 | Stop reason: HOSPADM

## 2025-09-02 RX ORDER — SODIUM CHLORIDE, SODIUM LACTATE, POTASSIUM CHLORIDE, CALCIUM CHLORIDE 600; 310; 30; 20 MG/100ML; MG/100ML; MG/100ML; MG/100ML
INJECTION, SOLUTION INTRAVENOUS CONTINUOUS
Status: DISCONTINUED | OUTPATIENT
Start: 2025-09-02 | End: 2025-09-02 | Stop reason: HOSPADM

## 2025-09-02 RX ORDER — ONDANSETRON 2 MG/ML
INJECTION INTRAMUSCULAR; INTRAVENOUS
Status: DISCONTINUED | OUTPATIENT
Start: 2025-09-02 | End: 2025-09-02 | Stop reason: SDUPTHER

## 2025-09-02 RX ADMIN — FENTANYL CITRATE 25 MCG: 50 INJECTION INTRAMUSCULAR; INTRAVENOUS at 12:40

## 2025-09-02 RX ADMIN — Medication 2000 MG: at 12:30

## 2025-09-02 RX ADMIN — FENTANYL CITRATE 25 MCG: 50 INJECTION INTRAMUSCULAR; INTRAVENOUS at 12:45

## 2025-09-02 RX ADMIN — OXYCODONE HYDROCHLORIDE 5 MG: 5 TABLET ORAL at 13:52

## 2025-09-02 RX ADMIN — FENTANYL CITRATE 25 MCG: 50 INJECTION INTRAMUSCULAR; INTRAVENOUS at 13:09

## 2025-09-02 RX ADMIN — HYDROMORPHONE HYDROCHLORIDE 0.5 MG: 1 INJECTION, SOLUTION INTRAMUSCULAR; INTRAVENOUS; SUBCUTANEOUS at 13:20

## 2025-09-02 RX ADMIN — ONDANSETRON 4 MG: 2 INJECTION, SOLUTION INTRAMUSCULAR; INTRAVENOUS at 12:55

## 2025-09-02 RX ADMIN — ROCURONIUM BROMIDE 10 MG: 10 INJECTION, SOLUTION INTRAVENOUS at 12:52

## 2025-09-02 RX ADMIN — SUGAMMADEX 200 MG: 100 INJECTION, SOLUTION INTRAVENOUS at 13:04

## 2025-09-02 RX ADMIN — ROCURONIUM BROMIDE 40 MG: 10 INJECTION, SOLUTION INTRAVENOUS at 12:22

## 2025-09-02 RX ADMIN — PROPOFOL 150 MG: 10 INJECTION, EMULSION INTRAVENOUS at 12:22

## 2025-09-02 RX ADMIN — DEXAMETHASONE SODIUM PHOSPHATE 10 MG: 10 INJECTION, SOLUTION INTRAMUSCULAR; INTRAVENOUS at 12:30

## 2025-09-02 RX ADMIN — FENTANYL CITRATE 25 MCG: 50 INJECTION INTRAMUSCULAR; INTRAVENOUS at 12:29

## 2025-09-02 RX ADMIN — SODIUM CHLORIDE, POTASSIUM CHLORIDE, SODIUM LACTATE AND CALCIUM CHLORIDE: 600; 310; 30; 20 INJECTION, SOLUTION INTRAVENOUS at 12:17

## 2025-09-02 ASSESSMENT — PAIN SCALES - GENERAL
PAINLEVEL_OUTOF10: 3
PAINLEVEL_OUTOF10: 2
PAINLEVEL_OUTOF10: 2
PAINLEVEL_OUTOF10: 6
PAINLEVEL_OUTOF10: 5
PAINLEVEL_OUTOF10: 3
PAINLEVEL_OUTOF10: 3
PAINLEVEL_OUTOF10: 5
PAINLEVEL_OUTOF10: 10

## 2025-09-02 ASSESSMENT — PAIN - FUNCTIONAL ASSESSMENT
PAIN_FUNCTIONAL_ASSESSMENT: 0-10

## 2025-09-02 ASSESSMENT — PAIN DESCRIPTION - DESCRIPTORS: DESCRIPTORS: ACHING;SHARP

## 2025-09-02 ASSESSMENT — PAIN DESCRIPTION - LOCATION
LOCATION: ABDOMEN
LOCATION: ABDOMEN

## (undated) DEVICE — MANIFOLD SUCT 4 PRT 2 CANSTR FLTR DISP NEPTUNE 2

## (undated) DEVICE — DRESSING TRNSPAR W5XL4.5IN FLM SHT SEMIPERMEABLE WIND

## (undated) DEVICE — MASTISOL ADHESIVE LIQ 2/3ML

## (undated) DEVICE — ELECTRODE PT RET AD L9FT HI MOIST COND ADH HYDRGEL CORDED

## (undated) DEVICE — 450 ML BOTTLE OF 0.05% CHLORHEXIDINE GLUCONATE IN 99.95% STERILE WATER FOR IRRIGATION, USP AND APPLICATOR.: Brand: IRRISEPT ANTIMICROBIAL WOUND LAVAGE

## (undated) DEVICE — SUTURE MONOCRYL + SZ 4-0 L27IN ABSRB UD L19MM PS-2 3/8 CIR MCP426H

## (undated) DEVICE — COVER TIP DIA8MM DA VINCI SI XI X ENDOWRIST

## (undated) DEVICE — SEAL UNIVERSAL 5-12MM

## (undated) DEVICE — ZIP 16 SURGICAL SKIN CLOSURE DEVICE: Brand: ZIP 16 SURGICAL SKIN CLOSURE DEVICE

## (undated) DEVICE — SUTURE MONOCRYL STRATAFIX SPRL + SZ 2 0 L27IN ABSRB UD W NDL SXMP1B419

## (undated) DEVICE — KIT SEP W/ BLD DRAW TB SYR NDL TRNQT PD

## (undated) DEVICE — NEEDLE HYPO 22GA L1.5IN ULT SHRP TRIBEVELED INTUITIVE 1 HND

## (undated) DEVICE — BLANKET WRM W29.9XL79.1IN UP BODY FORC AIR MISTRAL-AIR

## (undated) DEVICE — NEEDLE HYPO 21GA L1.5IN INTUITIVE 1 HND DSGN BVL MAGELLAN

## (undated) DEVICE — ST CHARLES TOTAL HIP: Brand: MEDLINE INDUSTRIES, INC.

## (undated) DEVICE — SUTURE PDS II SZ 0 L27IN ABSRB VLT UR-6 L26MM 1/2 CIR D7185

## (undated) DEVICE — Device

## (undated) DEVICE — ILLUMINATOR SURG YANKAUER MTL TIP STRL PHOTONSABER Y DISP

## (undated) DEVICE — SUTURE VICRYL + SZ 0 L27IN ABSRB VLT L26MM UR-6 5/8 CIR VCP603H

## (undated) DEVICE — GLOVE SURG SZ 75 CRM LTX FREE POLYISOPRENE POLYMER BEAD ANTI

## (undated) DEVICE — 4-PORT MANIFOLD: Brand: NEPTUNE 2

## (undated) DEVICE — SOLUTION IRRIG 1000ML 0.9% SOD CHL USP POUR PLAS BTL

## (undated) DEVICE — SOLUTION IRRIG 1000ML STRL H2O USP PLAS POUR BTL

## (undated) DEVICE — KIT DETRGNT ENZYMATIC SOLUTION 100 ML SOAK SHLD 5 VI LG HUMD

## (undated) DEVICE — SYSTEM BLLN L100MM DIA12MM BLNT TIP KII

## (undated) DEVICE — DRESSING HYDROCOLLOID BORDER 35X10 IN ALUM PRIMASEAL

## (undated) DEVICE — TRAY SURG PREP WET SKIN W CHG 6 SPNG TWL 2 COT TIP APPL

## (undated) DEVICE — GLOVE SURG SZ 85 L12IN FNGR THK79MIL GRN LTX FREE

## (undated) DEVICE — KIT AUTOTRNS APPL AERO 2 SET SYR 2 TIP FOR PLT SEP SYS GPS

## (undated) DEVICE — SUTURE ETHIBOND EXCEL SZ 1 L30IN NONABSORBABLE GRN L36MM CT-1 X425H

## (undated) DEVICE — 2108 SERIES SAGITTAL BLADE, NO OFFSET  (24.8 X 1.32 X 87.3MM)

## (undated) DEVICE — SUTURE STRATAFIX SYMMETRIC PDS + SZ 1 L18IN ABSRB VLT L48MM SXPP1A400

## (undated) DEVICE — GLOVE ORTHO 8   MSG9480

## (undated) DEVICE — OBTURATOR ROBOTIC DIA8MM STD OPT BLDELSS

## (undated) DEVICE — DRAPE ARM W21XH19XL10.5IN DA VINCI XI INTUITIVE SURGICAL

## (undated) DEVICE — SYSTEM TISS RETEN TRS

## (undated) DEVICE — SUTURE V-LOC 90 3-0 L9IN ABSRB VLT L26MM V-20 1/2 CIR TAPR VLOCM0644

## (undated) DEVICE — SOLUTION IRRIG 3000ML 0.9% SOD CHL USP UROMATIC PLAS CONT